# Patient Record
Sex: FEMALE | Race: WHITE | Employment: FULL TIME | URBAN - METROPOLITAN AREA
[De-identification: names, ages, dates, MRNs, and addresses within clinical notes are randomized per-mention and may not be internally consistent; named-entity substitution may affect disease eponyms.]

---

## 2017-08-20 ENCOUNTER — APPOINTMENT (OUTPATIENT)
Dept: CT IMAGING | Age: 52
End: 2017-08-20
Attending: EMERGENCY MEDICINE
Payer: SUBSIDIZED

## 2017-08-20 ENCOUNTER — HOSPITAL ENCOUNTER (EMERGENCY)
Age: 52
Discharge: HOME | End: 2017-08-20
Attending: EMERGENCY MEDICINE
Payer: SUBSIDIZED

## 2017-08-20 VITALS
BODY MASS INDEX: 24.45 KG/M2 | HEART RATE: 72 BPM | OXYGEN SATURATION: 98 % | SYSTOLIC BLOOD PRESSURE: 108 MMHG | RESPIRATION RATE: 18 BRPM | WEIGHT: 138 LBS | TEMPERATURE: 98 F | DIASTOLIC BLOOD PRESSURE: 60 MMHG | HEIGHT: 63 IN

## 2017-08-20 DIAGNOSIS — J18.9 PNEUMONIA OF RIGHT LOWER LOBE DUE TO INFECTIOUS ORGANISM: Primary | ICD-10-CM

## 2017-08-20 DIAGNOSIS — N30.00 ACUTE CYSTITIS WITHOUT HEMATURIA: ICD-10-CM

## 2017-08-20 LAB
ALBUMIN SERPL-MCNC: 3.3 G/DL (ref 3.5–5)
ALBUMIN/GLOB SERPL: 1 {RATIO} (ref 1.2–3.5)
ALP SERPL-CCNC: 122 U/L (ref 50–136)
ALT SERPL-CCNC: 10 U/L (ref 12–65)
ANION GAP SERPL CALC-SCNC: 10 MMOL/L (ref 7–16)
AST SERPL-CCNC: 12 U/L (ref 15–37)
BACTERIA URNS QL MICRO: ABNORMAL /HPF
BASOPHILS # BLD: 0 K/UL (ref 0–0.2)
BASOPHILS NFR BLD: 0 % (ref 0–2)
BILIRUB SERPL-MCNC: 0.2 MG/DL (ref 0.2–1.1)
BUN SERPL-MCNC: 17 MG/DL (ref 6–23)
CALCIUM SERPL-MCNC: 8.2 MG/DL (ref 8.3–10.4)
CASTS URNS QL MICRO: ABNORMAL /LPF
CHLORIDE SERPL-SCNC: 117 MMOL/L (ref 98–107)
CO2 SERPL-SCNC: 17 MMOL/L (ref 21–32)
CREAT SERPL-MCNC: 0.91 MG/DL (ref 0.6–1)
DIFFERENTIAL METHOD BLD: ABNORMAL
EOSINOPHIL # BLD: 0.2 K/UL (ref 0–0.8)
EOSINOPHIL NFR BLD: 2 % (ref 0.5–7.8)
EPI CELLS #/AREA URNS HPF: ABNORMAL /HPF
ERYTHROCYTE [DISTWIDTH] IN BLOOD BY AUTOMATED COUNT: 15.2 % (ref 11.9–14.6)
GLOBULIN SER CALC-MCNC: 3.3 G/DL (ref 2.3–3.5)
GLUCOSE SERPL-MCNC: 89 MG/DL (ref 65–100)
HCT VFR BLD AUTO: 37.5 % (ref 35.8–46.3)
HGB BLD-MCNC: 12 G/DL (ref 11.7–15.4)
IMM GRANULOCYTES # BLD: 0 K/UL (ref 0–0.5)
IMM GRANULOCYTES NFR BLD: 0.1 % (ref 0–5)
LIPASE SERPL-CCNC: 217 U/L (ref 73–393)
LYMPHOCYTES # BLD: 3.3 K/UL (ref 0.5–4.6)
LYMPHOCYTES NFR BLD: 37 % (ref 13–44)
MCH RBC QN AUTO: 32.1 PG (ref 26.1–32.9)
MCHC RBC AUTO-ENTMCNC: 32 G/DL (ref 31.4–35)
MCV RBC AUTO: 100.3 FL (ref 79.6–97.8)
MONOCYTES # BLD: 0.4 K/UL (ref 0.1–1.3)
MONOCYTES NFR BLD: 5 % (ref 4–12)
NEUTS SEG # BLD: 4.9 K/UL (ref 1.7–8.2)
NEUTS SEG NFR BLD: 56 % (ref 43–78)
PLATELET # BLD AUTO: 252 K/UL (ref 150–450)
PMV BLD AUTO: 9.4 FL (ref 10.8–14.1)
POTASSIUM SERPL-SCNC: 3.9 MMOL/L (ref 3.5–5.1)
PROT SERPL-MCNC: 6.6 G/DL (ref 6.3–8.2)
RBC # BLD AUTO: 3.74 M/UL (ref 4.05–5.25)
RBC #/AREA URNS HPF: ABNORMAL /HPF
SODIUM SERPL-SCNC: 144 MMOL/L (ref 136–145)
WBC # BLD AUTO: 8.9 K/UL (ref 4.3–11.1)
WBC URNS QL MICRO: ABNORMAL /HPF

## 2017-08-20 PROCEDURE — 74177 CT ABD & PELVIS W/CONTRAST: CPT

## 2017-08-20 PROCEDURE — 96376 TX/PRO/DX INJ SAME DRUG ADON: CPT | Performed by: EMERGENCY MEDICINE

## 2017-08-20 PROCEDURE — 74011250636 HC RX REV CODE- 250/636: Performed by: EMERGENCY MEDICINE

## 2017-08-20 PROCEDURE — 96375 TX/PRO/DX INJ NEW DRUG ADDON: CPT | Performed by: EMERGENCY MEDICINE

## 2017-08-20 PROCEDURE — 83690 ASSAY OF LIPASE: CPT | Performed by: EMERGENCY MEDICINE

## 2017-08-20 PROCEDURE — 85025 COMPLETE CBC W/AUTO DIFF WBC: CPT | Performed by: EMERGENCY MEDICINE

## 2017-08-20 PROCEDURE — 96374 THER/PROPH/DIAG INJ IV PUSH: CPT | Performed by: EMERGENCY MEDICINE

## 2017-08-20 PROCEDURE — 74011636320 HC RX REV CODE- 636/320: Performed by: EMERGENCY MEDICINE

## 2017-08-20 PROCEDURE — 81015 MICROSCOPIC EXAM OF URINE: CPT | Performed by: EMERGENCY MEDICINE

## 2017-08-20 PROCEDURE — 80053 COMPREHEN METABOLIC PANEL: CPT | Performed by: EMERGENCY MEDICINE

## 2017-08-20 PROCEDURE — 74011000258 HC RX REV CODE- 258: Performed by: EMERGENCY MEDICINE

## 2017-08-20 PROCEDURE — 99284 EMERGENCY DEPT VISIT MOD MDM: CPT | Performed by: EMERGENCY MEDICINE

## 2017-08-20 PROCEDURE — 96361 HYDRATE IV INFUSION ADD-ON: CPT | Performed by: EMERGENCY MEDICINE

## 2017-08-20 PROCEDURE — 96372 THER/PROPH/DIAG INJ SC/IM: CPT | Performed by: EMERGENCY MEDICINE

## 2017-08-20 PROCEDURE — 81003 URINALYSIS AUTO W/O SCOPE: CPT | Performed by: EMERGENCY MEDICINE

## 2017-08-20 RX ORDER — LEVOFLOXACIN 750 MG/1
750 TABLET ORAL DAILY
Qty: 5 TAB | Refills: 0 | Status: SHIPPED | OUTPATIENT
Start: 2017-08-20 | End: 2017-10-17

## 2017-08-20 RX ORDER — MORPHINE SULFATE 2 MG/ML
4 INJECTION, SOLUTION INTRAMUSCULAR; INTRAVENOUS ONCE
Status: COMPLETED | OUTPATIENT
Start: 2017-08-20 | End: 2017-08-20

## 2017-08-20 RX ORDER — SODIUM CHLORIDE 0.9 % (FLUSH) 0.9 %
5-10 SYRINGE (ML) INJECTION AS NEEDED
Status: DISCONTINUED | OUTPATIENT
Start: 2017-08-20 | End: 2017-08-21 | Stop reason: HOSPADM

## 2017-08-20 RX ORDER — PROMETHAZINE HYDROCHLORIDE 25 MG/ML
12.5 INJECTION, SOLUTION INTRAMUSCULAR; INTRAVENOUS
Status: COMPLETED | OUTPATIENT
Start: 2017-08-20 | End: 2017-08-20

## 2017-08-20 RX ORDER — SODIUM CHLORIDE 0.9 % (FLUSH) 0.9 %
5-10 SYRINGE (ML) INJECTION EVERY 8 HOURS
Status: DISCONTINUED | OUTPATIENT
Start: 2017-08-20 | End: 2017-08-21 | Stop reason: HOSPADM

## 2017-08-20 RX ORDER — SODIUM CHLORIDE 0.9 % (FLUSH) 0.9 %
10 SYRINGE (ML) INJECTION
Status: COMPLETED | OUTPATIENT
Start: 2017-08-20 | End: 2017-08-20

## 2017-08-20 RX ORDER — ONDANSETRON 2 MG/ML
4 INJECTION INTRAMUSCULAR; INTRAVENOUS
Status: COMPLETED | OUTPATIENT
Start: 2017-08-20 | End: 2017-08-20

## 2017-08-20 RX ADMIN — ONDANSETRON 4 MG: 2 INJECTION INTRAMUSCULAR; INTRAVENOUS at 19:20

## 2017-08-20 RX ADMIN — IOPAMIDOL 100 ML: 755 INJECTION, SOLUTION INTRAVENOUS at 21:39

## 2017-08-20 RX ADMIN — SODIUM CHLORIDE 500 ML: 900 INJECTION, SOLUTION INTRAVENOUS at 22:20

## 2017-08-20 RX ADMIN — MORPHINE SULFATE 4 MG: 2 INJECTION, SOLUTION INTRAMUSCULAR; INTRAVENOUS at 19:20

## 2017-08-20 RX ADMIN — DIATRIZOATE MEGLUMINE AND DIATRIZOATE SODIUM 15 ML: 600; 100 SOLUTION ORAL; RECTAL at 19:48

## 2017-08-20 RX ADMIN — MORPHINE SULFATE 4 MG: 2 INJECTION, SOLUTION INTRAMUSCULAR; INTRAVENOUS at 22:20

## 2017-08-20 RX ADMIN — Medication 10 ML: at 21:39

## 2017-08-20 RX ADMIN — PROMETHAZINE HYDROCHLORIDE 12.5 MG: 25 INJECTION INTRAMUSCULAR; INTRAVENOUS at 20:20

## 2017-08-20 RX ADMIN — SODIUM CHLORIDE 1000 ML: 900 INJECTION, SOLUTION INTRAVENOUS at 19:20

## 2017-08-20 RX ADMIN — SODIUM CHLORIDE 100 ML: 900 INJECTION, SOLUTION INTRAVENOUS at 21:39

## 2017-08-20 NOTE — ED PROVIDER NOTES
HPI Comments: 46 yo WF w/ PMHx of Kidney stones & GERD presents w/ intermittent, sharp, non-radiating RUQ pain x 3 days. Denies fever, chills, nausea, vomiting, dysuria, hematuria, diarrhea, constipation, SOB, cough, CP, hemoptysis, LE edema/apin. Denies any alleviating or exacerbating factors. Patient is a 47 y.o. female presenting with abdominal pain. The history is provided by the patient. No  was used. Abdominal Pain    This is a new problem. The current episode started more than 2 days ago. The problem occurs constantly. The problem has not changed since onset. The pain is associated with eating. The pain is located in the RUQ. The quality of the pain is sharp. The pain is at a severity of 3/10. Associated symptoms include nausea. Pertinent negatives include no anorexia, no fever, no belching, no diarrhea, no flatus, no hematochezia, no melena, no vomiting, no constipation, no dysuria, no hematuria, no headaches, no trauma, no chest pain and no back pain. The pain is worsened by palpation. The pain is relieved by nothing. Her past medical history is significant for kidney stones. Her past medical history does not include PUD, pancreatitis or diverticulitis. Past Medical History:   Diagnosis Date    Arthritis     Gastrointestinal disorder     GERD, hiatal hernia    GERD (gastroesophageal reflux disease)     Kidney stone     Migraines     Pyelonephritis, acute 6/30/2011    Pyelonephritis, acute        Past Surgical History:   Procedure Laterality Date    HX GYN      hysterectomy    HX OTHER SURGICAL      kidney stone surgery         History reviewed. No pertinent family history. Social History     Social History    Marital status:      Spouse name: N/A    Number of children: N/A    Years of education: N/A     Occupational History    Not on file.      Social History Main Topics    Smoking status: Current Every Day Smoker     Packs/day: 1.00     Years: 30.00  Smokeless tobacco: Never Used    Alcohol use No    Drug use: No    Sexual activity: Not Currently     Other Topics Concern    Not on file     Social History Narrative         ALLERGIES: Darvocet-n 100 [propoxyphene n-acetaminophen] and Toradol [ketorolac tromethamine]    Review of Systems   Constitutional: Negative for chills, fatigue and fever. HENT: Negative for congestion and rhinorrhea. Eyes: Negative for pain and redness. Respiratory: Negative for cough and shortness of breath. Cardiovascular: Negative for chest pain, palpitations and leg swelling. Gastrointestinal: Positive for abdominal pain and nausea. Negative for abdominal distention, anal bleeding, anorexia, blood in stool, constipation, diarrhea, flatus, hematochezia, melena and vomiting. Endocrine: Negative for polydipsia and polyphagia. Genitourinary: Negative for dysuria, flank pain, genital sores, hematuria, pelvic pain, vaginal bleeding and vaginal discharge. Musculoskeletal: Negative for back pain, gait problem, joint swelling, neck pain and neck stiffness. Skin: Negative for pallor and rash. Neurological: Negative for dizziness, seizures, syncope, weakness, numbness and headaches. Psychiatric/Behavioral: Negative for agitation and confusion. Vitals:    08/20/17 2037 08/20/17 2057 08/20/17 2116 08/20/17 2321   BP: 112/53 106/52 106/53 108/60   Pulse:    72   Resp:    18   Temp:    98 °F (36.7 °C)   SpO2: 94% 93% 95% 98%   Weight:       Height:                Physical Exam   Constitutional: She is oriented to person, place, and time. She appears well-developed and well-nourished. No distress. HENT:   Head: Normocephalic and atraumatic. Mouth/Throat: Oropharynx is clear and moist. No oropharyngeal exudate. Eyes: Conjunctivae and EOM are normal. Pupils are equal, round, and reactive to light. Neck: Normal range of motion. No JVD present. No tracheal deviation present.    Cardiovascular: Normal rate, regular rhythm, normal heart sounds and intact distal pulses. Exam reveals no gallop and no friction rub. No murmur heard. Radial pulses 2+ and equal bilaterally   Pulmonary/Chest: Effort normal and breath sounds normal. No respiratory distress. She has no wheezes. She has no rales. She exhibits no tenderness. Abdominal: Soft. Bowel sounds are normal. She exhibits no distension and no mass. There is tenderness. There is no rebound and no guarding.   + RUQ TTP. No CVAT. Negative Lang sign   Musculoskeletal: Normal range of motion. She exhibits no edema, tenderness or deformity. No calf tenderness present   Neurological: She is alert and oriented to person, place, and time. No cranial nerve deficit. Coordination normal.   Skin: Skin is warm and dry. No rash noted. No erythema. No pallor. Psychiatric: She has a normal mood and affect. Her behavior is normal.   Nursing note and vitals reviewed. MDM  Number of Diagnoses or Management Options  Acute cystitis without hematuria: new and requires workup  Pneumonia of right lower lobe due to infectious organism [facility]): new and requires workup  Diagnosis management comments: Patient presents w/ c/o right upper quadrant pain x 3 days. Vital signs stable. Patient is well-appearing and in no acute distress. Labs within normal limits; 1+ bacteria in urine. Evidence of right lower lobe atelectasis/consolidation. Will discharge home with Levaquin to follow with PCP in 1 or 2 days. Patient given return precautions.        Amount and/or Complexity of Data Reviewed  Clinical lab tests: ordered and reviewed  Tests in the radiology section of CPT®: ordered and reviewed  Tests in the medicine section of CPT®: ordered and reviewed  Review and summarize past medical records: yes  Discuss the patient with other providers: yes  Independent visualization of images, tracings, or specimens: yes    Risk of Complications, Morbidity, and/or Mortality  Presenting problems: moderate  Diagnostic procedures: moderate  Management options: moderate    Patient Progress  Patient progress: stable    ED Course   Comment By Time   Patient with right flank pain. CT pending. Frank Garcia MD 08/20 0746   Patient with mild evidence of dehydration. Patient is administered IV fluid boluses. CT negative for any acute intra-abdominal findings. UA with 1+ bacteria. Findings of right lower lobe pneumonia present. Will discharge patient home with Levaquin and have follow with PCP in 1 to 2 days.  Penelope Hernandez MD 08/20 4043       Procedures

## 2017-08-21 NOTE — DISCHARGE INSTRUCTIONS
Urinary Tract Infection in Women: Care Instructions  Your Care Instructions    A urinary tract infection, or UTI, is a general term for an infection anywhere between the kidneys and the urethra (where urine comes out). Most UTIs are bladder infections. They often cause pain or burning when you urinate. UTIs are caused by bacteria and can be cured with antibiotics. Be sure to complete your treatment so that the infection goes away. Follow-up care is a key part of your treatment and safety. Be sure to make and go to all appointments, and call your doctor if you are having problems. It's also a good idea to know your test results and keep a list of the medicines you take. How can you care for yourself at home? · Take your antibiotics as directed. Do not stop taking them just because you feel better. You need to take the full course of antibiotics. · Drink extra water and other fluids for the next day or two. This may help wash out the bacteria that are causing the infection. (If you have kidney, heart, or liver disease and have to limit fluids, talk with your doctor before you increase your fluid intake.)  · Avoid drinks that are carbonated or have caffeine. They can irritate the bladder. · Urinate often. Try to empty your bladder each time. · To relieve pain, take a hot bath or lay a heating pad set on low over your lower belly or genital area. Never go to sleep with a heating pad in place. To prevent UTIs  · Drink plenty of water each day. This helps you urinate often, which clears bacteria from your system. (If you have kidney, heart, or liver disease and have to limit fluids, talk with your doctor before you increase your fluid intake.)  · Urinate when you need to. · Urinate right after you have sex. · Change sanitary pads often. · Avoid douches, bubble baths, feminine hygiene sprays, and other feminine hygiene products that have deodorants.   · After going to the bathroom, wipe from front to back.  When should you call for help? Call your doctor now or seek immediate medical care if:  · Symptoms such as fever, chills, nausea, or vomiting get worse or appear for the first time. · You have new pain in your back just below your rib cage. This is called flank pain. · There is new blood or pus in your urine. · You have any problems with your antibiotic medicine. Watch closely for changes in your health, and be sure to contact your doctor if:  · You are not getting better after taking an antibiotic for 2 days. · Your symptoms go away but then come back. Where can you learn more? Go to http://osito-ashley.info/. Enter X680 in the search box to learn more about \"Urinary Tract Infection in Women: Care Instructions. \"  Current as of: November 28, 2016  Content Version: 11.3  © 6932-7132 [employer]. Care instructions adapted under license by [employer] (which disclaims liability or warranty for this information). If you have questions about a medical condition or this instruction, always ask your healthcare professional. [place] [address] any warranty or liability for your use of this information. Pneumonia: Care Instructions  Your Care Instructions    Pneumonia is an infection of the lungs. Most cases are caused by infections from bacteria or viruses. Pneumonia may be mild or very severe. If it is caused by bacteria, you will be treated with antibiotics. It may take a few weeks to a few months to recover fully from pneumonia, depending on how sick you were and whether your overall health is good. Follow-up care is a key part of your treatment and safety. Be sure to make and go to all appointments, and call your doctor if you are having problems. Its also a good idea to know your test results and keep a list of the medicines you take. How can you care for yourself at home? · Take your antibiotics exactly as directed.  Do not stop taking the medicine just because you are feeling better. You need to take the full course of antibiotics. · Take your medicines exactly as prescribed. Call your doctor if you think you are having a problem with your medicine. · Get plenty of rest and sleep. You may feel weak and tired for a while, but your energy level will improve with time. · To prevent dehydration, drink plenty of fluids, enough so that your urine is light yellow or clear like water. Choose water and other caffeine-free clear liquids until you feel better. If you have kidney, heart, or liver disease and have to limit fluids, talk with your doctor before you increase the amount of fluids you drink. · Take care of your cough so you can rest. A cough that brings up mucus from your lungs is common with pneumonia. It is one way your body gets rid of the infection. But if coughing keeps you from resting or causes severe fatigue and chest-wall pain, talk to your doctor. He or she may suggest that you take a medicine to reduce the cough. · Use a vaporizer or humidifier to add moisture to your bedroom. Follow the directions for cleaning the machine. · Do not smoke or allow others to smoke around you. Smoke will make your cough last longer. If you need help quitting, talk to your doctor about stop-smoking programs and medicines. These can increase your chances of quitting for good. · Take an over-the-counter pain medicine, such as acetaminophen (Tylenol), ibuprofen (Advil, Motrin), or naproxen (Aleve). Read and follow all instructions on the label. · Do not take two or more pain medicines at the same time unless the doctor told you to. Many pain medicines have acetaminophen, which is Tylenol. Too much acetaminophen (Tylenol) can be harmful. · If you were given a spirometer to measure how well your lungs are working, use it as instructed. This can help your doctor tell how your recovery is going.   · To prevent pneumonia in the future, talk to your doctor about getting a flu vaccine (once a year) and a pneumococcal vaccine (one time only for most people). When should you call for help? Call 911 anytime you think you may need emergency care. For example, call if:  · You have severe trouble breathing. Call your doctor now or seek immediate medical care if:  · You cough up dark brown or bloody mucus (sputum). · You have new or worse trouble breathing. · You are dizzy or lightheaded, or you feel like you may faint. Watch closely for changes in your health, and be sure to contact your doctor if:  · You have a new or higher fever. · You are coughing more deeply or more often. · You are not getting better after 2 days (48 hours). · You do not get better as expected. Where can you learn more? Go to http://osito-ashley.info/. Enter [id number] in the search box to learn more about \"Pneumonia: Care Instructions. \"  Current as of: March 25, 2017  Content Version: 11.3  © 9292-7746 [employer]. Care instructions adapted under license by [employer] (which disclaims liability or warranty for this information). If you have questions about a medical condition or this instruction, always ask your healthcare professional. [address] any warranty or liability for your use of this information. Abdominal Pain: Care Instructions  Your Care Instructions    Abdominal pain has many possible causes. Some aren't serious and get better on their own in a few days. Others need more testing and treatment. If your pain continues or gets worse, you need to be rechecked and may need more tests to find out what is wrong. You may need surgery to correct the problem. Don't ignore new symptoms, such as fever, nausea and vomiting, urination problems, pain that gets worse, and dizziness. These may be signs of a more serious problem. Your doctor may have recommended a follow-up visit in the next 8 to 12 hours.  If you are not getting better, you may need more tests or treatment. The doctor has checked you carefully, but problems can develop later. If you notice any problems or new symptoms, get medical treatment right away. Follow-up care is a key part of your treatment and safety. Be sure to make and go to all appointments, and call your doctor if you are having problems. It's also a good idea to know your test results and keep a list of the medicines you take. How can you care for yourself at home? · Rest until you feel better. · To prevent dehydration, drink plenty of fluids, enough so that your urine is light yellow or clear like water. Choose water and other caffeine-free clear liquids until you feel better. If you have kidney, heart, or liver disease and have to limit fluids, talk with your doctor before you increase the amount of fluids you drink. · If your stomach is upset, eat mild foods, such as rice, dry toast or crackers, bananas, and applesauce. Try eating several small meals instead of two or three large ones. · Wait until 48 hours after all symptoms have gone away before you have spicy foods, alcohol, and drinks that contain caffeine. · Do not eat foods that are high in fat. · Avoid anti-inflammatory medicines such as aspirin, ibuprofen (Advil, Motrin), and naproxen (Aleve). These can cause stomach upset. Talk to your doctor if you take daily aspirin for another health problem. When should you call for help? Call 911 anytime you think you may need emergency care. For example, call if:  · You passed out (lost consciousness). · You pass maroon or very bloody stools. · You vomit blood or what looks like coffee grounds. · You have new, severe belly pain. Call your doctor now or seek immediate medical care if:  · Your pain gets worse, especially if it becomes focused in one area of your belly. · You have a new or higher fever. · Your stools are black and look like tar, or they have streaks of blood.   · You have unexpected vaginal bleeding. · You have symptoms of a urinary tract infection. These may include:  ¨ Pain when you urinate. ¨ Urinating more often than usual.  ¨ Blood in your urine. · You are dizzy or lightheaded, or you feel like you may faint. Watch closely for changes in your health, and be sure to contact your doctor if:  · You are not getting better after 1 day (24 hours). Where can you learn more? Go to http://osito-ashley.info/. Enter G383 in the search box to learn more about \"Abdominal Pain: Care Instructions. \"  Current as of: March 20, 2017  Content Version: 11.3  © 6552-3439 [employer]. Care instructions adapted under license by [employer] (which disclaims liability or warranty for this information).  If you have questions about a medical condition or this instruction, always ask your healthcare professional. [place] [address] any warranty or liability for your use of this information.

## 2017-10-08 ENCOUNTER — HOSPITAL ENCOUNTER (EMERGENCY)
Age: 52
Discharge: HOME | End: 2017-10-08
Attending: EMERGENCY MEDICINE
Payer: SUBSIDIZED

## 2017-10-08 ENCOUNTER — APPOINTMENT (OUTPATIENT)
Dept: GENERAL RADIOLOGY | Age: 52
End: 2017-10-08
Attending: NURSE PRACTITIONER
Payer: SUBSIDIZED

## 2017-10-08 ENCOUNTER — APPOINTMENT (OUTPATIENT)
Dept: GENERAL RADIOLOGY | Age: 52
End: 2017-10-08
Attending: EMERGENCY MEDICINE
Payer: SUBSIDIZED

## 2017-10-08 ENCOUNTER — APPOINTMENT (OUTPATIENT)
Dept: ULTRASOUND IMAGING | Age: 52
End: 2017-10-08
Attending: EMERGENCY MEDICINE
Payer: SUBSIDIZED

## 2017-10-08 VITALS
BODY MASS INDEX: 23.39 KG/M2 | WEIGHT: 132 LBS | HEIGHT: 63 IN | OXYGEN SATURATION: 92 % | RESPIRATION RATE: 26 BRPM | SYSTOLIC BLOOD PRESSURE: 123 MMHG | DIASTOLIC BLOOD PRESSURE: 58 MMHG | HEART RATE: 73 BPM | TEMPERATURE: 97.9 F

## 2017-10-08 DIAGNOSIS — K80.50 RECURRENT BILIARY COLIC: Primary | ICD-10-CM

## 2017-10-08 LAB
ALBUMIN SERPL-MCNC: 3.4 G/DL (ref 3.5–5)
ALBUMIN/GLOB SERPL: 0.9 {RATIO} (ref 1.2–3.5)
ALP SERPL-CCNC: 130 U/L (ref 50–136)
ALT SERPL-CCNC: 9 U/L (ref 12–65)
ANION GAP SERPL CALC-SCNC: 9 MMOL/L (ref 7–16)
AST SERPL-CCNC: 16 U/L (ref 15–37)
BASOPHILS # BLD: 0 K/UL (ref 0–0.2)
BASOPHILS NFR BLD: 0 % (ref 0–2)
BILIRUB SERPL-MCNC: 0.3 MG/DL (ref 0.2–1.1)
BUN SERPL-MCNC: 14 MG/DL (ref 6–23)
CALCIUM SERPL-MCNC: 9.2 MG/DL (ref 8.3–10.4)
CHLORIDE SERPL-SCNC: 109 MMOL/L (ref 98–107)
CO2 SERPL-SCNC: 23 MMOL/L (ref 21–32)
CREAT SERPL-MCNC: 0.52 MG/DL (ref 0.6–1)
CRP SERPL-MCNC: 1.3 MG/DL (ref 0–0.9)
DIFFERENTIAL METHOD BLD: ABNORMAL
EOSINOPHIL # BLD: 0.1 K/UL (ref 0–0.8)
EOSINOPHIL NFR BLD: 1 % (ref 0.5–7.8)
ERYTHROCYTE [DISTWIDTH] IN BLOOD BY AUTOMATED COUNT: 14.8 % (ref 11.9–14.6)
GLOBULIN SER CALC-MCNC: 3.8 G/DL (ref 2.3–3.5)
GLUCOSE SERPL-MCNC: 111 MG/DL (ref 65–100)
HCT VFR BLD AUTO: 40.3 % (ref 35.8–46.3)
HGB BLD-MCNC: 13.4 G/DL (ref 11.7–15.4)
IMM GRANULOCYTES # BLD: 0 K/UL (ref 0–0.5)
IMM GRANULOCYTES NFR BLD: 0.2 % (ref 0–5)
LIPASE SERPL-CCNC: 229 U/L (ref 73–393)
LYMPHOCYTES # BLD: 3.2 K/UL (ref 0.5–4.6)
LYMPHOCYTES NFR BLD: 35 % (ref 13–44)
MCH RBC QN AUTO: 33.4 PG (ref 26.1–32.9)
MCHC RBC AUTO-ENTMCNC: 33.3 G/DL (ref 31.4–35)
MCV RBC AUTO: 100.5 FL (ref 79.6–97.8)
MONOCYTES # BLD: 0.4 K/UL (ref 0.1–1.3)
MONOCYTES NFR BLD: 4 % (ref 4–12)
NEUTS SEG # BLD: 5.5 K/UL (ref 1.7–8.2)
NEUTS SEG NFR BLD: 60 % (ref 43–78)
PLATELET # BLD AUTO: 272 K/UL (ref 150–450)
PMV BLD AUTO: 9.4 FL (ref 10.8–14.1)
POTASSIUM SERPL-SCNC: 4.2 MMOL/L (ref 3.5–5.1)
PROT SERPL-MCNC: 7.2 G/DL (ref 6.3–8.2)
RBC # BLD AUTO: 4.01 M/UL (ref 4.05–5.25)
SODIUM SERPL-SCNC: 141 MMOL/L (ref 136–145)
WBC # BLD AUTO: 9.3 K/UL (ref 4.3–11.1)

## 2017-10-08 PROCEDURE — 74022 RADEX COMPL AQT ABD SERIES: CPT

## 2017-10-08 PROCEDURE — 96361 HYDRATE IV INFUSION ADD-ON: CPT | Performed by: EMERGENCY MEDICINE

## 2017-10-08 PROCEDURE — 96375 TX/PRO/DX INJ NEW DRUG ADDON: CPT | Performed by: EMERGENCY MEDICINE

## 2017-10-08 PROCEDURE — 80053 COMPREHEN METABOLIC PANEL: CPT | Performed by: NURSE PRACTITIONER

## 2017-10-08 PROCEDURE — 96376 TX/PRO/DX INJ SAME DRUG ADON: CPT | Performed by: EMERGENCY MEDICINE

## 2017-10-08 PROCEDURE — 74011000250 HC RX REV CODE- 250: Performed by: EMERGENCY MEDICINE

## 2017-10-08 PROCEDURE — 74011250637 HC RX REV CODE- 250/637: Performed by: EMERGENCY MEDICINE

## 2017-10-08 PROCEDURE — 74011250636 HC RX REV CODE- 250/636: Performed by: EMERGENCY MEDICINE

## 2017-10-08 PROCEDURE — 96374 THER/PROPH/DIAG INJ IV PUSH: CPT | Performed by: EMERGENCY MEDICINE

## 2017-10-08 PROCEDURE — 81003 URINALYSIS AUTO W/O SCOPE: CPT | Performed by: EMERGENCY MEDICINE

## 2017-10-08 PROCEDURE — 83690 ASSAY OF LIPASE: CPT | Performed by: NURSE PRACTITIONER

## 2017-10-08 PROCEDURE — 76705 ECHO EXAM OF ABDOMEN: CPT

## 2017-10-08 PROCEDURE — 86140 C-REACTIVE PROTEIN: CPT | Performed by: EMERGENCY MEDICINE

## 2017-10-08 PROCEDURE — 85025 COMPLETE CBC W/AUTO DIFF WBC: CPT | Performed by: NURSE PRACTITIONER

## 2017-10-08 PROCEDURE — 99285 EMERGENCY DEPT VISIT HI MDM: CPT | Performed by: EMERGENCY MEDICINE

## 2017-10-08 RX ORDER — HYOSCYAMINE SULFATE 0.125 MG
125 TABLET ORAL
Qty: 12 TAB | Refills: 0 | Status: SHIPPED | OUTPATIENT
Start: 2017-10-08 | End: 2017-11-07

## 2017-10-08 RX ORDER — HYDROMORPHONE HYDROCHLORIDE 1 MG/ML
1 INJECTION, SOLUTION INTRAMUSCULAR; INTRAVENOUS; SUBCUTANEOUS
Status: COMPLETED | OUTPATIENT
Start: 2017-10-08 | End: 2017-10-08

## 2017-10-08 RX ORDER — PROMETHAZINE HYDROCHLORIDE 25 MG/1
25 TABLET ORAL
Qty: 12 TAB | Refills: 0 | Status: SHIPPED | OUTPATIENT
Start: 2017-10-08 | End: 2018-07-31

## 2017-10-08 RX ORDER — HYDROCODONE BITARTRATE AND ACETAMINOPHEN 5; 325 MG/1; MG/1
1 TABLET ORAL
Qty: 12 TAB | Refills: 0 | Status: SHIPPED | OUTPATIENT
Start: 2017-10-08 | End: 2017-11-01

## 2017-10-08 RX ORDER — HYOSCYAMINE SULFATE 0.12 MG/1
0.12 TABLET SUBLINGUAL
Status: COMPLETED | OUTPATIENT
Start: 2017-10-08 | End: 2017-10-08

## 2017-10-08 RX ADMIN — HYDROMORPHONE HYDROCHLORIDE 1 MG: 1 INJECTION, SOLUTION INTRAMUSCULAR; INTRAVENOUS; SUBCUTANEOUS at 15:47

## 2017-10-08 RX ADMIN — HYOSCYAMINE SULFATE 0.12 MG: 0.12 TABLET SUBLINGUAL at 13:33

## 2017-10-08 RX ADMIN — SODIUM CHLORIDE 1000 ML: 900 INJECTION, SOLUTION INTRAVENOUS at 13:30

## 2017-10-08 RX ADMIN — HYDROMORPHONE HYDROCHLORIDE 1 MG: 1 INJECTION, SOLUTION INTRAMUSCULAR; INTRAVENOUS; SUBCUTANEOUS at 13:30

## 2017-10-08 RX ADMIN — PROMETHAZINE HYDROCHLORIDE 12.5 MG: 25 INJECTION INTRAMUSCULAR; INTRAVENOUS at 13:30

## 2017-10-08 NOTE — ED NOTES
I have reviewed discharge instructions with the patient. The patient verbalized understanding. Patient left ED via Discharge Method: ambulatory to Home with family. Opportunity for questions and clarification provided. Patient given 3 scripts.

## 2017-10-08 NOTE — DISCHARGE INSTRUCTIONS
Gallbladder and Liver: Anatomy Sketch    Current as of: January 5, 2017  Content Version: 11.3  © 6473-7410 [employer]. Care instructions adapted under license by [employer] (which disclaims liability or warranty for this information). If you have questions about a medical condition or this instruction, always ask your healthcare professional. [address] any warranty or liability for your use of this information. Low-Fat Diet for Gallbladder Disease: Care Instructions  Your Care Instructions  When you eat, the gallbladder releases bile, which helps you digest the fat in food. If you have an inflamed gallbladder, this may cause pain. A low-fat diet may give your gallbladder a rest so you can start to heal. Your doctor and dietitian can help you make an eating plan that does not irritate your digestive system. Always talk with your doctor or dietitian before you make changes in your diet. Follow-up care is a key part of your treatment and safety. Be sure to make and go to all appointments, and call your doctor if you are having problems. It's also a good idea to know your test results and keep a list of the medicines you take. How can you care for yourself at home? · Eat many small meals and snacks each day instead of three large meals. · Choose lean meats. ¨ Eat no more than 5 to 6½ ounces of meat a day. ¨ Cut off all fat you can see. ¨ Eat chicken and turkey without the skin. ¨ Many types of fish, such as salmon, lake trout, tuna, and herring, provide healthy omega-3 fat. But, avoid fish canned in oil, such as sardines in olive oil. ¨ Bake, broil, or grill meats, poultry, or fish instead of frying them in butter or fat. · Drink or eat nonfat or low-fat milk, yogurt, cheese, or other milk products each day. ¨ Read the labels on cheeses, and choose those with less than 5 grams of fat an ounce.   ¨ Try fat-free sour cream, cream cheese, or yogurt. ¨ Avoid cream soups and cream sauces on pasta. ¨ Eat low-fat ice cream, frozen yogurt, or sorbet. Avoid regular ice cream.  · Eat whole-grain cereals, breads, crackers, rice, or pasta. Avoid high-fat foods such as croissants, scones, biscuits, waffles, doughnuts, muffins, granola, and high-fat breads. · Flavor your foods with herbs and spices (such as basil, tarragon, or mint), fat-free sauces, or lemon juice instead of butter. You can also use butter substitutes, fat-free mayonnaise, or fat-free dressing. · Try applesauce, prune puree, or mashed bananas to replace some or all of the fat when you bake. · Limit fats and oils, such as butter, margarine, mayonnaise, and salad dressing, to no more than 1 tablespoon a meal.  · Avoid high-fat foods, such as:  ¨ Chocolate, whole milk, ice cream, and processed cheese. ¨ Fried or buttered foods. ¨ Sausage, salami, and herrera. ¨ Cinnamon rolls, cakes, pies, cookies, and other pastries. ¨ Prepared snack foods, such as potato chips, nut and granola bars, and mixed nuts. ¨ Coconut and avocado. · Learn how to read food labels for serving sizes and ingredients. Fast-food and convenience-food meals often have lots of fat. Where can you learn more? Go to http://oisto-ashley.info/. Enter Z780 in the search box to learn more about \"Low-Fat Diet for Gallbladder Disease: Care Instructions. \"  Current as of: July 26, 2016  Content Version: 11.3  © 5369-2777 [employer]. Care instructions adapted under license by [employer] (which disclaims liability or warranty for this information). If you have questions about a medical condition or this instruction, always ask your healthcare professional. [address] any warranty or liability for your use of this information. Cholecystectomy: Before Your Surgery  What is cholecystectomy? Cholecystectomy (rm-lry-ujy-HARSHAL-tuh-jane) is a type of surgery.  It removes a diseased gallbladder. This surgery is usually done as a laparoscopic surgery. The doctor puts a lighted tube and other surgical tools through small cuts (incisions) in your belly. The tube is called a scope. It lets your doctor see your organs so he or she can do the surgery. The incisions leave scars that fade with time. Most people go home the same day. You probably will feel better each day. Most people have only a small amount of pain after 1 week. If you have a desk job, you can probably go back to work in 1 to 2 weeks. If you lift heavy objects or have a very active job, it may take up to 4 weeks. In some cases, open surgery is the best choice. Your doctor may choose open surgery in advance. Or he or she may choose it in the middle of laparoscopic surgery. In open surgery, the doctor makes a larger incision in your upper belly. If you have open surgery, you will probably stay in the hospital for 2 to 4 days. And it may take 4 to 6 weeks to get back to your normal routine. Follow-up care is a key part of your treatment and safety. Be sure to make and go to all appointments, and call your doctor if you are having problems. It's also a good idea to know your test results and keep a list of the medicines you take. What happens before surgery? Surgery can be stressful. This information will help you understand what you can expect. And it will help you safely prepare for surgery. Preparing for surgery  · Understand exactly what surgery is planned, along with the risks, benefits, and other options. · Tell your doctors ALL the medicines, vitamins, supplements, and herbal remedies you take. Some of these can increase the risk of bleeding or interact with anesthesia. · If you take blood thinners, such as warfarin (Coumadin), clopidogrel (Plavix), or aspirin, be sure to talk to your doctor. He or she will tell you if you should stop taking these medicines before your surgery.  Make sure that you understand exactly what your doctor wants you to do. · Your doctor will tell you which medicines to take or stop before your surgery. You may need to stop taking certain medicines a week or more before surgery. So talk to your doctor as soon as you can. · If you have an advance directive, let your doctor know. It may include a living will and a durable power of  for health care. Bring a copy to the hospital. If you don't have one, you may want to prepare one. It lets your doctor and loved ones know your health care wishes. Doctors advise that everyone prepare these papers before any type of surgery or procedure. · You may need to empty your colon with an enema or laxative. Your doctor will tell you how to do this. What happens on the day of surgery? · Follow the instructions exactly about when to stop eating and drinking. If you don't, your surgery may be canceled. If your doctor told you to take your medicines on the day of surgery, take them with only a sip of water. · Take a bath or shower before you come in for your surgery. Do not apply lotions, perfumes, deodorants, or nail polish. · Do not shave the surgical site yourself. · Take off all jewelry and piercings. And take out contact lenses, if you wear them. At the hospital or surgery center  · Bring a picture ID. · The area for surgery is often marked to make sure there are no errors. · You will be kept comfortable and safe by your anesthesia provider. You will be asleep during the surgery. · The surgery usually takes 1 to 2 hours. Going home  · Be sure you have someone to drive you home. Anesthesia and pain medicine make it unsafe for you to drive. · You will be given more specific instructions about recovering from your surgery. They will cover things like diet, wound care, follow-up care, driving, and getting back to your normal routine. When should you call your doctor? · You have questions or concerns.   · You don't understand how to prepare for your surgery. · You become ill before the surgery (such as fever, flu, or a cold). · You need to reschedule or have changed your mind about having the surgery. Where can you learn more? Go to http://osito-ashley.info/. Enter T811 in the search box to learn more about \"Cholecystectomy: Before Your Surgery. \"  Current as of: August 9, 2016  Content Version: 11.3  © 7798-9820 [employer]. Care instructions adapted under license by [employer] (which disclaims liability or warranty for this information). If you have questions about a medical condition or this instruction, always ask your healthcare professional. [address] any warranty or liability for your use of this information.

## 2017-10-08 NOTE — ED PROVIDER NOTES
HPI Comments: Patient returns to return with complaints of persistent right upper quadrant abdominal pain that has been present since at least August 20 when she was originally seen in the emergency Department for the same. CT at that time demonstrated left lower lobe infiltrate possibly and the patient was treated as pneumonia although she was complaining of right-sided abdominal pain at that time as well. She denies any fever but reports the pain has been constant since previously being seen and that she's been having persistent and uncontrollable nausea and vomiting for the past week she reports subjective fever and chills  No diarrhea and rates the pain as 10 over 10 in intensity stabbing in nature in the right upper quadrant radiating to the right side of the back    Patient is a 47 y.o. female presenting with abdominal pain. The history is provided by the patient. Abdominal Pain    This is a chronic problem. Episode onset: about 5 weeks ago. The problem occurs constantly. The problem has been gradually worsening. The pain is associated with vomiting. The pain is located in the RUQ. The quality of the pain is sharp. The pain is at a severity of 10/10. The pain is severe. Associated symptoms include nausea and vomiting. Pertinent negatives include no anorexia, no fever, no belching, no diarrhea, no flatus, no hematochezia, no melena, no constipation, no dysuria, no frequency, no hematuria, no headaches, no arthralgias, no myalgias, no chest pain and no back pain. Nothing worsens the pain. The pain is relieved by nothing. Past workup includes CT scan. The patient's surgical history includes hysterectomy.        Past Medical History:   Diagnosis Date    Arthritis     Gastrointestinal disorder     GERD, hiatal hernia    GERD (gastroesophageal reflux disease)     Kidney stone     Migraines     Pyelonephritis, acute 6/30/2011    Pyelonephritis, acute        Past Surgical History:   Procedure Laterality Date  HX GYN      hysterectomy    HX OTHER SURGICAL      kidney stone surgery         No family history on file. Social History     Social History    Marital status:      Spouse name: N/A    Number of children: N/A    Years of education: N/A     Occupational History    Not on file. Social History Main Topics    Smoking status: Current Every Day Smoker     Packs/day: 1.00     Years: 30.00    Smokeless tobacco: Never Used    Alcohol use No    Drug use: No    Sexual activity: Not Currently     Other Topics Concern    Not on file     Social History Narrative         ALLERGIES: Darvocet-n 100 [propoxyphene n-acetaminophen] and Toradol [ketorolac tromethamine]    Review of Systems   Constitutional: Negative for fever. Cardiovascular: Negative for chest pain. Gastrointestinal: Positive for abdominal pain, nausea and vomiting. Negative for anorexia, constipation, diarrhea, flatus, hematochezia and melena. Genitourinary: Negative for dysuria, frequency and hematuria. Musculoskeletal: Negative for arthralgias, back pain and myalgias. Neurological: Negative for headaches. All other systems reviewed and are negative. Vitals:    10/08/17 1247   BP: 119/82   Pulse: 80   Resp: 26   Temp: 97.9 °F (36.6 °C)   SpO2: 97%   Weight: 59.9 kg (132 lb)   Height: 5' 3\" (1.6 m)            Physical Exam   Constitutional: She is oriented to person, place, and time. She appears well-developed and well-nourished. No distress. HENT:   Head: Normocephalic and atraumatic. Eyes: Conjunctivae and EOM are normal. Pupils are equal, round, and reactive to light. Neck: Normal range of motion. Neck supple. Cardiovascular: Normal rate, regular rhythm and normal heart sounds. Pulmonary/Chest: Effort normal and breath sounds normal.   Abdominal: Soft. She exhibits no distension and no mass. There is tenderness. There is no rebound and no guarding.    Positive right flank tenderness, bowel sounds diminished to absent   Musculoskeletal: Normal range of motion. She exhibits no edema or tenderness. Neurological: She is alert and oriented to person, place, and time. Skin: Skin is warm and dry. Psychiatric: She has a normal mood and affect. Her behavior is normal.   Nursing note and vitals reviewed.        MDM  Number of Diagnoses or Management Options     Amount and/or Complexity of Data Reviewed  Clinical lab tests: ordered and reviewed  Tests in the radiology section of CPT®: ordered and reviewed  Independent visualization of images, tracings, or specimens: yes    Risk of Complications, Morbidity, and/or Mortality  Presenting problems: high  Diagnostic procedures: moderate  Management options: moderate    Patient Progress  Patient progress: stable    ED Course       Procedures

## 2017-10-08 NOTE — ED NOTES
Assumed care of patient at shift change. Patient history, physical, and results reviewed by myself. Independent exam performed. Ultrasound shows distended gallbladder, otherwise unremarkable. Given surgery follow-up for HIDA scan. I discussed the results of all labs, procedures, radiographs, and treatments with the patient and available family. Treatment plan is agreed upon and the patient is ready for discharge. Questions about treatment in the ED and differential diagnosis of presenting condition were answered. Patient was given verbal discharge instructions including, but not limited to, importance of returning to the emergency department for any concern of worsening or continued symptoms. Instructions were given to follow up with a primary care provider or specialist within 1-2 days. Adverse effects of medications, if prescribed, were discussed and patient was advised to refrain from significant physical activity until followed up by primary care physician and to not drive or operate heavy machinery after taking any sedating substances.

## 2017-10-17 PROBLEM — R10.11 RIGHT UPPER QUADRANT ABDOMINAL PAIN: Status: ACTIVE | Noted: 2017-10-17

## 2017-10-18 ENCOUNTER — HOSPITAL ENCOUNTER (OUTPATIENT)
Dept: NUCLEAR MEDICINE | Age: 52
Discharge: HOME | End: 2017-10-18
Attending: SURGERY
Payer: SUBSIDIZED

## 2017-10-18 DIAGNOSIS — R10.11 RIGHT UPPER QUADRANT ABDOMINAL PAIN: ICD-10-CM

## 2017-10-18 PROCEDURE — 78227 HEPATOBIL SYST IMAGE W/DRUG: CPT

## 2017-10-18 PROCEDURE — 74011250636 HC RX REV CODE- 250/636: Performed by: SURGERY

## 2017-10-18 RX ORDER — SODIUM CHLORIDE 0.9 % (FLUSH) 0.9 %
10 SYRINGE (ML) INJECTION
Status: COMPLETED | OUTPATIENT
Start: 2017-10-18 | End: 2017-10-18

## 2017-10-18 RX ADMIN — Medication 10 ML: at 13:08

## 2017-10-18 RX ADMIN — SINCALIDE 1.19 MCG: 5 INJECTION, POWDER, LYOPHILIZED, FOR SOLUTION INTRAVENOUS at 14:11

## 2017-11-01 ENCOUNTER — HOSPITAL ENCOUNTER (EMERGENCY)
Age: 52
Discharge: HOME | End: 2017-11-01
Attending: EMERGENCY MEDICINE
Payer: SUBSIDIZED

## 2017-11-01 ENCOUNTER — APPOINTMENT (OUTPATIENT)
Dept: ULTRASOUND IMAGING | Age: 52
End: 2017-11-01
Attending: EMERGENCY MEDICINE
Payer: SUBSIDIZED

## 2017-11-01 VITALS
HEIGHT: 63 IN | SYSTOLIC BLOOD PRESSURE: 140 MMHG | BODY MASS INDEX: 22.5 KG/M2 | WEIGHT: 127 LBS | RESPIRATION RATE: 18 BRPM | DIASTOLIC BLOOD PRESSURE: 74 MMHG | TEMPERATURE: 98 F | OXYGEN SATURATION: 99 % | HEART RATE: 89 BPM

## 2017-11-01 DIAGNOSIS — R10.11 RUQ PAIN: ICD-10-CM

## 2017-11-01 DIAGNOSIS — K80.20 CALCULUS OF GALLBLADDER WITHOUT CHOLECYSTITIS WITHOUT OBSTRUCTION: Primary | ICD-10-CM

## 2017-11-01 DIAGNOSIS — K80.50 RECURRENT BILIARY COLIC: ICD-10-CM

## 2017-11-01 LAB
ALBUMIN SERPL-MCNC: 3.8 G/DL (ref 3.5–5)
ALBUMIN/GLOB SERPL: 1 {RATIO} (ref 1.2–3.5)
ALP SERPL-CCNC: 126 U/L (ref 50–136)
ALT SERPL-CCNC: 14 U/L (ref 12–65)
ANION GAP SERPL CALC-SCNC: 11 MMOL/L (ref 7–16)
AST SERPL-CCNC: 15 U/L (ref 15–37)
BASOPHILS # BLD: 0 K/UL (ref 0–0.2)
BASOPHILS NFR BLD: 0 % (ref 0–2)
BILIRUB SERPL-MCNC: 0.1 MG/DL (ref 0.2–1.1)
BUN SERPL-MCNC: 17 MG/DL (ref 6–23)
CALCIUM SERPL-MCNC: 8.5 MG/DL (ref 8.3–10.4)
CHLORIDE SERPL-SCNC: 111 MMOL/L (ref 98–107)
CO2 SERPL-SCNC: 18 MMOL/L (ref 21–32)
CREAT SERPL-MCNC: 0.66 MG/DL (ref 0.6–1)
DIFFERENTIAL METHOD BLD: ABNORMAL
EOSINOPHIL # BLD: 0.2 K/UL (ref 0–0.8)
EOSINOPHIL NFR BLD: 2 % (ref 0.5–7.8)
ERYTHROCYTE [DISTWIDTH] IN BLOOD BY AUTOMATED COUNT: 14.6 % (ref 11.9–14.6)
GLOBULIN SER CALC-MCNC: 3.8 G/DL (ref 2.3–3.5)
GLUCOSE SERPL-MCNC: 133 MG/DL (ref 65–100)
HCT VFR BLD AUTO: 39.8 % (ref 35.8–46.3)
HGB BLD-MCNC: 13 G/DL (ref 11.7–15.4)
IMM GRANULOCYTES # BLD: 0 K/UL (ref 0–0.5)
IMM GRANULOCYTES NFR BLD: 0 % (ref 0–5)
LIPASE SERPL-CCNC: 254 U/L (ref 73–393)
LYMPHOCYTES # BLD: 3.9 K/UL (ref 0.5–4.6)
LYMPHOCYTES NFR BLD: 44 % (ref 13–44)
MCH RBC QN AUTO: 32.6 PG (ref 26.1–32.9)
MCHC RBC AUTO-ENTMCNC: 32.7 G/DL (ref 31.4–35)
MCV RBC AUTO: 99.7 FL (ref 79.6–97.8)
MONOCYTES # BLD: 0.4 K/UL (ref 0.1–1.3)
MONOCYTES NFR BLD: 4 % (ref 4–12)
NEUTS SEG # BLD: 4.4 K/UL (ref 1.7–8.2)
NEUTS SEG NFR BLD: 50 % (ref 43–78)
PLATELET # BLD AUTO: 326 K/UL (ref 150–450)
PMV BLD AUTO: 9.1 FL (ref 10.8–14.1)
POTASSIUM SERPL-SCNC: 3.6 MMOL/L (ref 3.5–5.1)
PROT SERPL-MCNC: 7.6 G/DL (ref 6.3–8.2)
RBC # BLD AUTO: 3.99 M/UL (ref 4.05–5.25)
SODIUM SERPL-SCNC: 140 MMOL/L (ref 136–145)
WBC # BLD AUTO: 8.9 K/UL (ref 4.3–11.1)

## 2017-11-01 PROCEDURE — 99284 EMERGENCY DEPT VISIT MOD MDM: CPT | Performed by: EMERGENCY MEDICINE

## 2017-11-01 PROCEDURE — 96375 TX/PRO/DX INJ NEW DRUG ADDON: CPT | Performed by: EMERGENCY MEDICINE

## 2017-11-01 PROCEDURE — 80053 COMPREHEN METABOLIC PANEL: CPT | Performed by: EMERGENCY MEDICINE

## 2017-11-01 PROCEDURE — 83690 ASSAY OF LIPASE: CPT | Performed by: EMERGENCY MEDICINE

## 2017-11-01 PROCEDURE — 74011250636 HC RX REV CODE- 250/636: Performed by: EMERGENCY MEDICINE

## 2017-11-01 PROCEDURE — 85025 COMPLETE CBC W/AUTO DIFF WBC: CPT | Performed by: EMERGENCY MEDICINE

## 2017-11-01 PROCEDURE — 81003 URINALYSIS AUTO W/O SCOPE: CPT | Performed by: EMERGENCY MEDICINE

## 2017-11-01 PROCEDURE — 96374 THER/PROPH/DIAG INJ IV PUSH: CPT | Performed by: EMERGENCY MEDICINE

## 2017-11-01 PROCEDURE — 96376 TX/PRO/DX INJ SAME DRUG ADON: CPT | Performed by: EMERGENCY MEDICINE

## 2017-11-01 PROCEDURE — 76705 ECHO EXAM OF ABDOMEN: CPT

## 2017-11-01 RX ORDER — ONDANSETRON 4 MG/1
4 TABLET, ORALLY DISINTEGRATING ORAL
Qty: 6 TAB | Refills: 1 | Status: SHIPPED | OUTPATIENT
Start: 2017-11-01 | End: 2017-11-04

## 2017-11-01 RX ORDER — HYDROCODONE BITARTRATE AND ACETAMINOPHEN 5; 325 MG/1; MG/1
1 TABLET ORAL
Qty: 9 TAB | Refills: 0 | Status: SHIPPED | OUTPATIENT
Start: 2017-11-01 | End: 2017-11-04

## 2017-11-01 RX ORDER — MORPHINE SULFATE 2 MG/ML
4 INJECTION, SOLUTION INTRAMUSCULAR; INTRAVENOUS ONCE
Status: COMPLETED | OUTPATIENT
Start: 2017-11-01 | End: 2017-11-01

## 2017-11-01 RX ORDER — ONDANSETRON 2 MG/ML
4 INJECTION INTRAMUSCULAR; INTRAVENOUS
Status: COMPLETED | OUTPATIENT
Start: 2017-11-01 | End: 2017-11-01

## 2017-11-01 RX ORDER — CEFAZOLIN SODIUM IN 0.9 % NACL 2 G/50 ML
2 INTRAVENOUS SOLUTION, PIGGYBACK (ML) INTRAVENOUS ONCE
Status: CANCELLED | OUTPATIENT
Start: 2017-11-14 | End: 2017-11-14

## 2017-11-01 RX ADMIN — MORPHINE SULFATE 4 MG: 2 INJECTION, SOLUTION INTRAMUSCULAR; INTRAVENOUS at 20:27

## 2017-11-01 RX ADMIN — ONDANSETRON 4 MG: 2 INJECTION INTRAMUSCULAR; INTRAVENOUS at 22:59

## 2017-11-01 RX ADMIN — ONDANSETRON 4 MG: 2 INJECTION INTRAMUSCULAR; INTRAVENOUS at 20:23

## 2017-11-01 NOTE — ED TRIAGE NOTES
Pt c/o right upper abd pain. States been going on for 3 months. Diagnosed with gall bladder issues. States saw Dr. Jordan Parks, to have surgery. States n/v and no appetite.

## 2017-11-02 NOTE — DISCHARGE INSTRUCTIONS
Abdominal Pain: Care Instructions  Your Care Instructions    Abdominal pain has many possible causes. Some aren't serious and get better on their own in a few days. Others need more testing and treatment. If your pain continues or gets worse, you need to be rechecked and may need more tests to find out what is wrong. You may need surgery to correct the problem. Don't ignore new symptoms, such as fever, nausea and vomiting, urination problems, pain that gets worse, and dizziness. These may be signs of a more serious problem. Your doctor may have recommended a follow-up visit in the next 8 to 12 hours. If you are not getting better, you may need more tests or treatment. The doctor has checked you carefully, but problems can develop later. If you notice any problems or new symptoms, get medical treatment right away. Follow-up care is a key part of your treatment and safety. Be sure to make and go to all appointments, and call your doctor if you are having problems. It's also a good idea to know your test results and keep a list of the medicines you take. How can you care for yourself at home? · Rest until you feel better. · To prevent dehydration, drink plenty of fluids, enough so that your urine is light yellow or clear like water. Choose water and other caffeine-free clear liquids until you feel better. If you have kidney, heart, or liver disease and have to limit fluids, talk with your doctor before you increase the amount of fluids you drink. · If your stomach is upset, eat mild foods, such as rice, dry toast or crackers, bananas, and applesauce. Try eating several small meals instead of two or three large ones. · Wait until 48 hours after all symptoms have gone away before you have spicy foods, alcohol, and drinks that contain caffeine. · Do not eat foods that are high in fat. · Avoid anti-inflammatory medicines such as aspirin, ibuprofen (Advil, Motrin), and naproxen (Aleve).  These can cause stomach upset. Talk to your doctor if you take daily aspirin for another health problem. When should you call for help? Call 911 anytime you think you may need emergency care. For example, call if:  ? · You passed out (lost consciousness). ? · You pass maroon or very bloody stools. ? · You vomit blood or what looks like coffee grounds. ? · You have new, severe belly pain. ?Call your doctor now or seek immediate medical care if:  ? · Your pain gets worse, especially if it becomes focused in one area of your belly. ? · You have a new or higher fever. ? · Your stools are black and look like tar, or they have streaks of blood. ? · You have unexpected vaginal bleeding. ? · You have symptoms of a urinary tract infection. These may include:  ¨ Pain when you urinate. ¨ Urinating more often than usual.  ¨ Blood in your urine. ? · You are dizzy or lightheaded, or you feel like you may faint. ? Watch closely for changes in your health, and be sure to contact your doctor if:  ? · You are not getting better after 1 day (24 hours). Where can you learn more? Go to http://ositoNortisashley.info/. Enter H013 in the search box to learn more about \"Abdominal Pain: Care Instructions. \"  Current as of: March 20, 2017  Content Version: 11.4  © 3650-8946 [employer]. Care instructions adapted under license by [employer] (which disclaims liability or warranty for this information). If you have questions about a medical condition or this instruction, always ask your healthcare professional. [place] [address] any warranty or liability for your use of this information. Cholecystectomy: Before Your Surgery  What is cholecystectomy? Cholecystectomy (jb-kym-cvg-HARSHAL-tuh-jane) is a type of surgery. It removes a diseased gallbladder. This surgery is usually done as a laparoscopic surgery.  The doctor puts a lighted tube and other surgical tools through small cuts (incisions) in your belly. The tube is called a scope. It lets your doctor see your organs so he or she can do the surgery. The incisions leave scars that fade with time. Most people go home the same day. You probably will feel better each day. Most people have only a small amount of pain after 1 week. If you have a desk job, you can probably go back to work in 1 to 2 weeks. If you lift heavy objects or have a very active job, it may take up to 4 weeks. In some cases, open surgery is the best choice. Your doctor may choose open surgery in advance. Or he or she may choose it in the middle of laparoscopic surgery. In open surgery, the doctor makes a larger incision in your upper belly. If you have open surgery, you will probably stay in the hospital for 2 to 4 days. And it may take 4 to 6 weeks to get back to your normal routine. Follow-up care is a key part of your treatment and safety. Be sure to make and go to all appointments, and call your doctor if you are having problems. It's also a good idea to know your test results and keep a list of the medicines you take. What happens before surgery? ?Surgery can be stressful. This information will help you understand what you can expect. And it will help you safely prepare for surgery. ? Preparing for surgery  ? · Understand exactly what surgery is planned, along with the risks, benefits, and other options. · Tell your doctors ALL the medicines, vitamins, supplements, and herbal remedies you take. Some of these can increase the risk of bleeding or interact with anesthesia. ? · If you take blood thinners, such as warfarin (Coumadin), clopidogrel (Plavix), or aspirin, be sure to talk to your doctor. He or she will tell you if you should stop taking these medicines before your surgery. Make sure that you understand exactly what your doctor wants you to do.   ? · Your doctor will tell you which medicines to take or stop before your surgery.  You may need to stop taking certain medicines a week or more before surgery. So talk to your doctor as soon as you can.   ? · If you have an advance directive, let your doctor know. It may include a living will and a durable power of  for health care. Bring a copy to the hospital. If you don't have one, you may want to prepare one. It lets your doctor and loved ones know your health care wishes. Doctors advise that everyone prepare these papers before any type of surgery or procedure. ? · You may need to empty your colon with an enema or laxative. Your doctor will tell you how to do this. What happens on the day of surgery? · Follow the instructions exactly about when to stop eating and drinking. If you don't, your surgery may be canceled. If your doctor told you to take your medicines on the day of surgery, take them with only a sip of water. ? · Take a bath or shower before you come in for your surgery. Do not apply lotions, perfumes, deodorants, or nail polish. ? · Do not shave the surgical site yourself. ? · Take off all jewelry and piercings. And take out contact lenses, if you wear them. ? At the hospital or surgery center   · Bring a picture ID. ? · The area for surgery is often marked to make sure there are no errors. ? · You will be kept comfortable and safe by your anesthesia provider. You will be asleep during the surgery. ? · The surgery usually takes 1 to 2 hours. Going home   · Be sure you have someone to drive you home. Anesthesia and pain medicine make it unsafe for you to drive. ? · You will be given more specific instructions about recovering from your surgery. They will cover things like diet, wound care, follow-up care, driving, and getting back to your normal routine. When should you call your doctor? · You have questions or concerns. ? · You don't understand how to prepare for your surgery. ? · You become ill before the surgery (such as fever, flu, or a cold).    ? · You need to reschedule or have changed your mind about having the surgery. Where can you learn more? Go to http://osito-ashley.info/. Enter N995 in the search box to learn more about \"Cholecystectomy: Before Your Surgery. \"  Current as of: May 12, 2017  Content Version: 11.4  © 2423-0308 [employer]. Care instructions adapted under license by [employer] (which disclaims liability or warranty for this information). If you have questions about a medical condition or this instruction, always ask your healthcare professional. [address] any warranty or liability for your use of this information. Biliary Colic: Care Instructions  Your Care Instructions    Biliary (say \"BILL-ee-air-ee\") colic is belly pain caused by gallbladder problems. It is usually caused by a gallstone moving through or blocking the common bile duct or cystic duct. Gallstones are stones that form in the gallbladder. They are made of cholesterol and other substances. The gallbladder is a small sac located just under the liver. It stores bile released by the liver. Bile helps you digest fats. Gallstones also can form in the common bile duct or cystic duct. These ducts carry bile from the gallbladder and the liver to the small intestine. Gallstones may be as small as a grain of sand or as large as a golf ball. Gallstones that cause severe symptoms usually are treated with surgery to remove the gallbladder. If the first attack of biliary colic is mild, it is often safe to wait until you have had another attack before you think about having surgery. The doctor has checked you carefully, but problems can develop later. If you notice any problems or new symptoms, get medical treatment right away. Follow-up care is a key part of your treatment and safety. Be sure to make and go to all appointments, and call your doctor if you are having problems.  It's also a good idea to know your test results and keep a list of the medicines you take. How can you care for yourself at home? · Take pain medicines exactly as directed. ¨ If the doctor gave you a prescription medicine for pain, take it as prescribed. ¨ If you are not taking a prescription pain medicine, ask your doctor if you can take an over-the-counter medicine. Read and follow all instructions on the label. · Avoid foods that cause symptoms, especially fatty foods. These can cause biliary colic. · You may need more tests to look at your gallbladder. When should you call for help? Call your doctor now or seek immediate medical care if:  ? · You have a fever. ? · You have new belly pain, or your pain gets worse. ? · There is a new or increasing yellow tint to your skin or the whites of your eyes. ? · Your urine is dark yellow-brown, or your stools are light-colored or white. ? · You cannot keep down fluids. ? Watch closely for changes in your health, and be sure to contact your doctor if:  ? · You do not get better as expected. ? · You are not getting better after 1 day (24 hours). Where can you learn more? Go to http://osito-ashley.info/. Enter H082 in the search box to learn more about \"Biliary Colic: Care Instructions. \"  Current as of: May 12, 2017  Content Version: 11.4  © 1715-3293 [employer]. Care instructions adapted under license by [employer] (which disclaims liability or warranty for this information). If you have questions about a medical condition or this instruction, always ask your healthcare professional. [place] [address] any warranty or liability for your use of this information.

## 2017-11-02 NOTE — ED PROVIDER NOTES
HPI Comments: 39-year-old female with past history of GERD presents with complaint of progressively worsening nonradiating sharp, constant right upper quadrant pain early course the past several days. States that she's been having these symptoms intermittently over the past 3 months. Patient followed up with Dr. Marcy Boone around 2 weeks ago and underwent HIDA scan; EF ~27%. Decision was made for patient to undergo cholecystectomy on 11/14/2017. States that she was informed to present to the ER for symptoms worsened or progressed in any way. States she's had worsening nausea, vomiting. Reports decreased appetite. Patient denies fever, chills, dysuria, melena, hematochezia, constipation. Reports intermittent diarrhea. Patient is a 47 y.o. female presenting with abdominal pain. The history is provided by the patient. No  was used. Abdominal Pain    This is a recurrent problem. The current episode started more than 1 week ago. The problem occurs constantly. The problem has not changed since onset. The pain is associated with vomiting. The pain is located in the RUQ. The quality of the pain is sharp and colicky. The pain is at a severity of 4/10. Associated symptoms include nausea and vomiting. Pertinent negatives include no anorexia, no fever, no belching, no diarrhea, no flatus, no hematochezia, no melena, no constipation, no dysuria, no frequency, no hematuria, no headaches, no chest pain and no back pain. The pain is worsened by eating and palpation. The pain is relieved by nothing. Past workup includes CT scan, ultrasound. Her past medical history is significant for gallstones and kidney stones. Her past medical history does not include ulcerative colitis, Crohn's disease or pancreatitis.         Past Medical History:   Diagnosis Date    Arthritis     Gastrointestinal disorder     GERD, hiatal hernia    GERD (gastroesophageal reflux disease)     Kidney stone     Migraines     Pyelonephritis, acute 6/30/2011    Pyelonephritis, acute        Past Surgical History:   Procedure Laterality Date    HX GYN      hysterectomy    HX OTHER SURGICAL      kidney stone surgery         History reviewed. No pertinent family history. Social History     Social History    Marital status:      Spouse name: N/A    Number of children: N/A    Years of education: N/A     Occupational History    Not on file. Social History Main Topics    Smoking status: Current Every Day Smoker     Packs/day: 1.00     Years: 30.00    Smokeless tobacco: Never Used    Alcohol use No    Drug use: No    Sexual activity: Not Currently     Other Topics Concern    Not on file     Social History Narrative         ALLERGIES: Darvocet-n 100 [propoxyphene n-acetaminophen] and Toradol [ketorolac tromethamine]    Review of Systems   Constitutional: Negative for chills, fatigue and fever. HENT: Negative for congestion and rhinorrhea. Respiratory: Negative for cough and shortness of breath. Cardiovascular: Negative for chest pain, palpitations and leg swelling. Gastrointestinal: Positive for abdominal pain, nausea and vomiting. Negative for abdominal distention, anal bleeding, anorexia, constipation, diarrhea, flatus, hematochezia and melena. Genitourinary: Negative for dysuria, flank pain, frequency, hematuria, pelvic pain, vaginal bleeding and vaginal discharge. Musculoskeletal: Negative for back pain, gait problem, joint swelling, neck pain and neck stiffness. Skin: Negative for pallor and rash. Neurological: Negative for dizziness, syncope, weakness and headaches. Vitals:    11/01/17 1930   BP: 124/82   Pulse: 99   Resp: 20   Temp: 97.9 °F (36.6 °C)   SpO2: 97%   Weight: 57.6 kg (127 lb)   Height: 5' 3\" (1.6 m)            Physical Exam   Constitutional: She is oriented to person, place, and time. She appears well-developed and well-nourished. No distress.    HENT:   Head: Normocephalic and atraumatic. Mouth/Throat: Oropharynx is clear and moist. No oropharyngeal exudate. Eyes: Conjunctivae and EOM are normal. Pupils are equal, round, and reactive to light. Neck: Normal range of motion. No JVD present. No tracheal deviation present. Cardiovascular: Normal rate, regular rhythm, normal heart sounds and intact distal pulses. No murmur heard. Pulmonary/Chest: Effort normal and breath sounds normal. No respiratory distress. She has no wheezes. She has no rales. She exhibits no tenderness. Abdominal: Soft. Bowel sounds are normal. She exhibits no distension and no mass. There is tenderness. There is no rebound and no guarding. Mild RUQ TTP. Negative Lang's sign. No CVAT. Musculoskeletal: Normal range of motion. She exhibits no edema, tenderness or deformity. Neurological: She is alert and oriented to person, place, and time. No cranial nerve deficit. Coordination normal.   Skin: Skin is warm and dry. No rash noted. No erythema. No pallor. Psychiatric: She has a normal mood and affect. Her behavior is normal.   Nursing note and vitals reviewed. MDM  Number of Diagnoses or Management Options  Calculus of gallbladder without cholecystitis without obstruction: new and requires workup  Recurrent biliary colic: new and requires workup  RUQ pain: new and requires workup  Diagnosis management comments: Patient presents with recurrent biliary colic. Mild to moderate right upper quadrant tenderness on exam.  Vital signs stable. Patient afebrile. WBC within normal limits. Normal LFTs. Lipase normal. UA negative for UTI. Pain and nausea well controlled. US w/ findings as mentioned below; no evidence of acute cholecystitis. Dr. Carole Godoy on call for General Surgery. States to d/c home w/ pain control and antiemetic and have patient call office in am and surgery will be moved up sooner. Informed patient and she is in agreement with plan.         Amount and/or Complexity of Data Reviewed  Clinical lab tests: ordered and reviewed  Tests in the radiology section of CPT®: ordered and reviewed  Tests in the medicine section of CPT®: ordered and reviewed  Review and summarize past medical records: yes  Independent visualization of images, tracings, or specimens: yes    Risk of Complications, Morbidity, and/or Mortality  Presenting problems: moderate  Diagnostic procedures: moderate  Management options: moderate    Patient Progress  Patient progress: stable    ED Course   Comment By Time   US RUQ IMPRESSION:   1. Distended gallbladder with tiny stones and/or sludge. 2. No biliary dilatation.   3. Right renal cyst. Frank Christianson MD 11/01 2158       Procedures

## 2017-11-02 NOTE — ED NOTES
Report to [facility]. Tonja Mahoneyi RN to assume care of this pt at this time.

## 2017-11-07 ENCOUNTER — HOSPITAL ENCOUNTER (OUTPATIENT)
Dept: SURGERY | Age: 52
Discharge: HOME | End: 2017-11-07

## 2017-11-07 VITALS
BODY MASS INDEX: 23.61 KG/M2 | DIASTOLIC BLOOD PRESSURE: 55 MMHG | TEMPERATURE: 98.3 F | SYSTOLIC BLOOD PRESSURE: 120 MMHG | WEIGHT: 133.25 LBS | OXYGEN SATURATION: 98 % | HEIGHT: 63 IN | RESPIRATION RATE: 18 BRPM | HEART RATE: 67 BPM

## 2017-11-07 RX ORDER — CEFAZOLIN SODIUM IN 0.9 % NACL 2 G/50 ML
2 INTRAVENOUS SOLUTION, PIGGYBACK (ML) INTRAVENOUS ONCE
Status: CANCELLED | OUTPATIENT
Start: 2017-11-14 | End: 2017-11-14

## 2017-11-07 NOTE — PERIOP NOTES
Patient verified name, , and surgery as listed in [facility]. Patient provided medical/health information and PTA medications to the best of their ability. TYPE  CASE:2  Orders per surgeon: Received   Labs per surgeon:none. Labs per anesthesia protocol: K+. Results 17  EKG  :  Not needed at time of PAT    Patient provided with and instructed on education handouts including Guide to Surgery, blood transfusions, pain management, and hand hygiene for the family and community, and [facility] brochure. soap and instructions given per hospital policy. Instructed patient to continue previous medications as prescribed prior to surgery unless otherwise directed and to take the following medications the day of surgery according to anesthesia guidelines : Omeprazole, Phenergan . Instructed patient to hold  the following medications: Celebrex. Original medication prescription bottles not visualized during patient appointment. Patient teach back successful and patient demonstrates knowledge of instruction.

## 2017-11-13 ENCOUNTER — ANESTHESIA EVENT (OUTPATIENT)
Dept: SURGERY | Age: 52
End: 2017-11-13
Payer: SUBSIDIZED

## 2017-11-14 ENCOUNTER — ANESTHESIA (OUTPATIENT)
Dept: SURGERY | Age: 52
End: 2017-11-14
Payer: SUBSIDIZED

## 2017-11-14 ENCOUNTER — HOSPITAL ENCOUNTER (OUTPATIENT)
Age: 52
Setting detail: OUTPATIENT SURGERY
Discharge: HOME | End: 2017-11-14
Attending: SURGERY | Admitting: SURGERY
Payer: SUBSIDIZED

## 2017-11-14 VITALS
BODY MASS INDEX: 23.18 KG/M2 | RESPIRATION RATE: 17 BRPM | WEIGHT: 130.8 LBS | DIASTOLIC BLOOD PRESSURE: 55 MMHG | OXYGEN SATURATION: 93 % | SYSTOLIC BLOOD PRESSURE: 108 MMHG | TEMPERATURE: 98.4 F | HEIGHT: 63 IN | HEART RATE: 72 BPM

## 2017-11-14 PROCEDURE — 77030008518 HC TBNG INSUF ENDO STRY -B: Performed by: SURGERY

## 2017-11-14 PROCEDURE — 76210000006 HC OR PH I REC 0.5 TO 1 HR: Performed by: SURGERY

## 2017-11-14 PROCEDURE — 77030000038 HC TIP SCIS LAPSCP SURI -B: Performed by: SURGERY

## 2017-11-14 PROCEDURE — 77030008522 HC TBNG INSUF LAPRO STRY -B: Performed by: SURGERY

## 2017-11-14 PROCEDURE — 74011250636 HC RX REV CODE- 250/636

## 2017-11-14 PROCEDURE — 77030010507 HC ADH SKN DERMBND J&J -B: Performed by: SURGERY

## 2017-11-14 PROCEDURE — 88304 TISSUE EXAM BY PATHOLOGIST: CPT | Performed by: SURGERY

## 2017-11-14 PROCEDURE — 77030035051: Performed by: SURGERY

## 2017-11-14 PROCEDURE — 74011000250 HC RX REV CODE- 250: Performed by: ANESTHESIOLOGY

## 2017-11-14 PROCEDURE — 77030008477 HC STYL SATN SLP COVD -A: Performed by: NURSE ANESTHETIST, CERTIFIED REGISTERED

## 2017-11-14 PROCEDURE — 77030011640 HC PAD GRND REM COVD -A: Performed by: SURGERY

## 2017-11-14 PROCEDURE — 74011250636 HC RX REV CODE- 250/636: Performed by: ANESTHESIOLOGY

## 2017-11-14 PROCEDURE — 77030031139 HC SUT VCRL2 J&J -A: Performed by: SURGERY

## 2017-11-14 PROCEDURE — 77030035048 HC TRCR ENDOSC OPTCL COVD -B: Performed by: SURGERY

## 2017-11-14 PROCEDURE — 77030012022 HC APPL CLP ENDOSC COVD -C: Performed by: SURGERY

## 2017-11-14 PROCEDURE — 77030008756 HC TU IRR SUC STRY -B: Performed by: SURGERY

## 2017-11-14 PROCEDURE — 74011250637 HC RX REV CODE- 250/637: Performed by: ANESTHESIOLOGY

## 2017-11-14 PROCEDURE — 77030032490 HC SLV COMPR SCD KNE COVD -B: Performed by: SURGERY

## 2017-11-14 PROCEDURE — 76060000033 HC ANESTHESIA 1 TO 1.5 HR: Performed by: SURGERY

## 2017-11-14 PROCEDURE — 77030019908 HC STETH ESOPH SIMS -A: Performed by: NURSE ANESTHETIST, CERTIFIED REGISTERED

## 2017-11-14 PROCEDURE — 76210000021 HC REC RM PH II 0.5 TO 1 HR: Performed by: SURGERY

## 2017-11-14 PROCEDURE — 77030035220 HC TRCR ENDOSC BLNTPRT ANCHR COVD -B: Performed by: SURGERY

## 2017-11-14 PROCEDURE — 74011000250 HC RX REV CODE- 250

## 2017-11-14 PROCEDURE — 77030008703 HC TU ET UNCUF COVD -A: Performed by: NURSE ANESTHETIST, CERTIFIED REGISTERED

## 2017-11-14 PROCEDURE — 76010000160 HC OR TIME 0.5 TO 1 HR INTENSV-TIER 1: Performed by: SURGERY

## 2017-11-14 PROCEDURE — 77030002933 HC SUT MCRYL J&J -A: Performed by: SURGERY

## 2017-11-14 PROCEDURE — 77030020782 HC GWN BAIR PAWS FLX 3M -B: Performed by: NURSE ANESTHETIST, CERTIFIED REGISTERED

## 2017-11-14 RX ORDER — FAMOTIDINE 20 MG/1
20 TABLET, FILM COATED ORAL ONCE
Status: COMPLETED | OUTPATIENT
Start: 2017-11-14 | End: 2017-11-14

## 2017-11-14 RX ORDER — OXYCODONE AND ACETAMINOPHEN 7.5; 325 MG/1; MG/1
1 TABLET ORAL
Qty: 25 TAB | Refills: 0 | Status: SHIPPED | OUTPATIENT
Start: 2017-11-14 | End: 2018-07-31

## 2017-11-14 RX ORDER — SUCCINYLCHOLINE CHLORIDE 20 MG/ML
INJECTION INTRAMUSCULAR; INTRAVENOUS AS NEEDED
Status: DISCONTINUED | OUTPATIENT
Start: 2017-11-14 | End: 2017-11-14

## 2017-11-14 RX ORDER — ESMOLOL HYDROCHLORIDE 10 MG/ML
INJECTION INTRAVENOUS AS NEEDED
Status: DISCONTINUED | OUTPATIENT
Start: 2017-11-14 | End: 2017-11-14 | Stop reason: HOSPADM

## 2017-11-14 RX ORDER — CEFAZOLIN SODIUM IN 0.9 % NACL 2 G/50 ML
2 INTRAVENOUS SOLUTION, PIGGYBACK (ML) INTRAVENOUS ONCE
Status: COMPLETED | OUTPATIENT
Start: 2017-11-14 | End: 2017-11-14

## 2017-11-14 RX ORDER — GLYCOPYRROLATE 0.2 MG/ML
INJECTION INTRAMUSCULAR; INTRAVENOUS AS NEEDED
Status: DISCONTINUED | OUTPATIENT
Start: 2017-11-14 | End: 2017-11-14 | Stop reason: HOSPADM

## 2017-11-14 RX ORDER — LIDOCAINE HYDROCHLORIDE 10 MG/ML
0.1 INJECTION INFILTRATION; PERINEURAL AS NEEDED
Status: DISCONTINUED | OUTPATIENT
Start: 2017-11-14 | End: 2017-11-14 | Stop reason: HOSPADM

## 2017-11-14 RX ORDER — ROCURONIUM BROMIDE 10 MG/ML
INJECTION, SOLUTION INTRAVENOUS AS NEEDED
Status: DISCONTINUED | OUTPATIENT
Start: 2017-11-14 | End: 2017-11-14 | Stop reason: HOSPADM

## 2017-11-14 RX ORDER — DEXAMETHASONE SODIUM PHOSPHATE 4 MG/ML
INJECTION, SOLUTION INTRA-ARTICULAR; INTRALESIONAL; INTRAMUSCULAR; INTRAVENOUS; SOFT TISSUE AS NEEDED
Status: DISCONTINUED | OUTPATIENT
Start: 2017-11-14 | End: 2017-11-14 | Stop reason: HOSPADM

## 2017-11-14 RX ORDER — SODIUM CHLORIDE, SODIUM LACTATE, POTASSIUM CHLORIDE, CALCIUM CHLORIDE 600; 310; 30; 20 MG/100ML; MG/100ML; MG/100ML; MG/100ML
150 INJECTION, SOLUTION INTRAVENOUS CONTINUOUS
Status: DISCONTINUED | OUTPATIENT
Start: 2017-11-14 | End: 2017-11-14 | Stop reason: HOSPADM

## 2017-11-14 RX ORDER — SODIUM CHLORIDE 9 MG/ML
50 INJECTION, SOLUTION INTRAVENOUS CONTINUOUS
Status: DISCONTINUED | OUTPATIENT
Start: 2017-11-14 | End: 2017-11-14 | Stop reason: HOSPADM

## 2017-11-14 RX ORDER — FENTANYL CITRATE 50 UG/ML
INJECTION, SOLUTION INTRAMUSCULAR; INTRAVENOUS AS NEEDED
Status: DISCONTINUED | OUTPATIENT
Start: 2017-11-14 | End: 2017-11-14 | Stop reason: HOSPADM

## 2017-11-14 RX ORDER — SODIUM CHLORIDE 0.9 % (FLUSH) 0.9 %
5-10 SYRINGE (ML) INJECTION AS NEEDED
Status: DISCONTINUED | OUTPATIENT
Start: 2017-11-14 | End: 2017-11-14 | Stop reason: HOSPADM

## 2017-11-14 RX ORDER — APREPITANT 40 MG/1
40 CAPSULE ORAL ONCE
Status: COMPLETED | OUTPATIENT
Start: 2017-11-14 | End: 2017-11-14

## 2017-11-14 RX ORDER — LIDOCAINE HYDROCHLORIDE 20 MG/ML
INJECTION, SOLUTION EPIDURAL; INFILTRATION; INTRACAUDAL; PERINEURAL AS NEEDED
Status: DISCONTINUED | OUTPATIENT
Start: 2017-11-14 | End: 2017-11-14 | Stop reason: HOSPADM

## 2017-11-14 RX ORDER — ACETAMINOPHEN 500 MG
1000 TABLET ORAL
Status: DISCONTINUED | OUTPATIENT
Start: 2017-11-14 | End: 2017-11-14 | Stop reason: HOSPADM

## 2017-11-14 RX ORDER — HYDROMORPHONE HYDROCHLORIDE 1 MG/ML
1 INJECTION, SOLUTION INTRAMUSCULAR; INTRAVENOUS; SUBCUTANEOUS ONCE
Status: COMPLETED | OUTPATIENT
Start: 2017-11-14 | End: 2017-11-14

## 2017-11-14 RX ORDER — SODIUM CHLORIDE 0.9 % (FLUSH) 0.9 %
5-10 SYRINGE (ML) INJECTION EVERY 8 HOURS
Status: DISCONTINUED | OUTPATIENT
Start: 2017-11-14 | End: 2017-11-14 | Stop reason: HOSPADM

## 2017-11-14 RX ORDER — FENTANYL CITRATE 50 UG/ML
100 INJECTION, SOLUTION INTRAMUSCULAR; INTRAVENOUS ONCE
Status: DISCONTINUED | OUTPATIENT
Start: 2017-11-14 | End: 2017-11-14 | Stop reason: HOSPADM

## 2017-11-14 RX ORDER — HYDROMORPHONE HYDROCHLORIDE 2 MG/ML
0.5 INJECTION, SOLUTION INTRAMUSCULAR; INTRAVENOUS; SUBCUTANEOUS
Status: DISCONTINUED | OUTPATIENT
Start: 2017-11-14 | End: 2017-11-14 | Stop reason: HOSPADM

## 2017-11-14 RX ORDER — ONDANSETRON 2 MG/ML
INJECTION INTRAMUSCULAR; INTRAVENOUS AS NEEDED
Status: DISCONTINUED | OUTPATIENT
Start: 2017-11-14 | End: 2017-11-14 | Stop reason: HOSPADM

## 2017-11-14 RX ORDER — NEOSTIGMINE METHYLSULFATE 1 MG/ML
INJECTION INTRAVENOUS AS NEEDED
Status: DISCONTINUED | OUTPATIENT
Start: 2017-11-14 | End: 2017-11-14 | Stop reason: HOSPADM

## 2017-11-14 RX ORDER — PROPOFOL 10 MG/ML
INJECTION, EMULSION INTRAVENOUS AS NEEDED
Status: DISCONTINUED | OUTPATIENT
Start: 2017-11-14 | End: 2017-11-14 | Stop reason: HOSPADM

## 2017-11-14 RX ORDER — MIDAZOLAM HYDROCHLORIDE 1 MG/ML
2 INJECTION, SOLUTION INTRAMUSCULAR; INTRAVENOUS
Status: DISCONTINUED | OUTPATIENT
Start: 2017-11-14 | End: 2017-11-14 | Stop reason: HOSPADM

## 2017-11-14 RX ORDER — HYDROCODONE BITARTRATE AND ACETAMINOPHEN 5; 325 MG/1; MG/1
1 TABLET ORAL AS NEEDED
Status: DISCONTINUED | OUTPATIENT
Start: 2017-11-14 | End: 2017-11-14 | Stop reason: HOSPADM

## 2017-11-14 RX ADMIN — APREPITANT 40 MG: 40 CAPSULE ORAL at 11:54

## 2017-11-14 RX ADMIN — GLYCOPYRROLATE 0.4 MG: 0.2 INJECTION INTRAMUSCULAR; INTRAVENOUS at 13:16

## 2017-11-14 RX ADMIN — ONDANSETRON 4 MG: 2 INJECTION INTRAMUSCULAR; INTRAVENOUS at 13:10

## 2017-11-14 RX ADMIN — HYDROCODONE BITARTRATE AND ACETAMINOPHEN 1 TABLET: 5; 325 TABLET ORAL at 13:49

## 2017-11-14 RX ADMIN — SODIUM CHLORIDE, SODIUM LACTATE, POTASSIUM CHLORIDE, AND CALCIUM CHLORIDE 150 ML/HR: 600; 310; 30; 20 INJECTION, SOLUTION INTRAVENOUS at 11:02

## 2017-11-14 RX ADMIN — HYDROMORPHONE HYDROCHLORIDE 0.5 MG: 2 INJECTION, SOLUTION INTRAMUSCULAR; INTRAVENOUS; SUBCUTANEOUS at 13:34

## 2017-11-14 RX ADMIN — HYDROMORPHONE HYDROCHLORIDE 0.5 MG: 2 INJECTION, SOLUTION INTRAMUSCULAR; INTRAVENOUS; SUBCUTANEOUS at 13:39

## 2017-11-14 RX ADMIN — FAMOTIDINE 20 MG: 20 TABLET, FILM COATED ORAL at 11:02

## 2017-11-14 RX ADMIN — HYDROMORPHONE HYDROCHLORIDE 1 MG: 1 INJECTION, SOLUTION INTRAMUSCULAR; INTRAVENOUS; SUBCUTANEOUS at 11:54

## 2017-11-14 RX ADMIN — PROMETHAZINE HYDROCHLORIDE 3.25 MG: 25 INJECTION INTRAMUSCULAR; INTRAVENOUS at 14:01

## 2017-11-14 RX ADMIN — LIDOCAINE HYDROCHLORIDE 100 MG: 20 INJECTION, SOLUTION EPIDURAL; INFILTRATION; INTRACAUDAL; PERINEURAL at 12:43

## 2017-11-14 RX ADMIN — PROPOFOL 150 MG: 10 INJECTION, EMULSION INTRAVENOUS at 12:43

## 2017-11-14 RX ADMIN — NEOSTIGMINE METHYLSULFATE 3 MG: 1 INJECTION INTRAVENOUS at 13:16

## 2017-11-14 RX ADMIN — DEXAMETHASONE SODIUM PHOSPHATE 4 MG: 4 INJECTION, SOLUTION INTRA-ARTICULAR; INTRALESIONAL; INTRAMUSCULAR; INTRAVENOUS; SOFT TISSUE at 13:10

## 2017-11-14 RX ADMIN — ROCURONIUM BROMIDE 40 MG: 10 INJECTION, SOLUTION INTRAVENOUS at 12:43

## 2017-11-14 RX ADMIN — ESMOLOL HYDROCHLORIDE 30 MG: 10 INJECTION INTRAVENOUS at 13:25

## 2017-11-14 RX ADMIN — CEFAZOLIN 2 G: 1 INJECTION, POWDER, FOR SOLUTION INTRAMUSCULAR; INTRAVENOUS; PARENTERAL at 12:31

## 2017-11-14 RX ADMIN — FENTANYL CITRATE 100 MCG: 50 INJECTION, SOLUTION INTRAMUSCULAR; INTRAVENOUS at 12:43

## 2017-11-14 RX ADMIN — HYDROMORPHONE HYDROCHLORIDE 0.5 MG: 2 INJECTION, SOLUTION INTRAMUSCULAR; INTRAVENOUS; SUBCUTANEOUS at 13:50

## 2017-11-14 NOTE — BRIEF OP NOTE
BRIEF OPERATIVE NOTE    Date of Procedure: 11/14/2017   Preoperative Diagnosis: Cholecystitis, chronic [K81.1]  Postoperative Diagnosis: Cholecystitis, chronic [K81.1]    Procedure(s):  CHOLECYSTECTOMY LAPAROSCOPIC  Surgeon(s) and Role:      Tony Dickens MD - Primary         Assistant Staff:       Surgical Staff:  Circ-1: Aye Glaser RN  Scrub Tech-1: Jeremías Johns  Scrub Tech-2: Edwina Pina  Event Time In   Incision Start 1250   Incision Close      Anesthesia: General   Estimated Blood Loss: minimal  Specimens:   ID Type Source Tests Collected by Time Destination   1 : Gallbladder Routine Gallbladder  Tung Sarmiento MD 11/14/2017 1310 Pathology      Findings: see op note   Complications: none  Implants: * No implants in log *

## 2017-11-14 NOTE — OP NOTES
[address] 64   OPERATIVE REPORT       Name:  eHnny Garcia   MR#:  [id number]   :  1965   Account #:  [de-identified]   Date of Adm:  2017       DATE OF SURGERY: 2017. PREOPERATIVE DIAGNOSIS: Cholecystitis. POSTOPERATIVE DIAGNOSIS: Cholecystitis. NAME OF PROCEDURE: Laparoscopic cholecystectomy. SURGEON: Polo Mack. Padmini Marte MD.    ANESTHESIA: General.    COMPLICATIONS: None. COUNTS: Correct. ESTIMATED BLOOD LOSS: Minimal.    SPECIMEN: Gallbladder. DESCRIPTION OF PROCEDURE: After the patient was asleep, her   abdomen was prepped and draped in sterile fashion. The incision   was made just above the umbilicus in the midline. The fascia was   then opened and several 0 Vicryl stitches were placed. At this   point, the single site diaphragm was then inserted with a Joel Randy. Insufflation was then acquired. Scope was then introduced, and   it was seen that the long trocars would be utilized. The robot   was then brought in and docked to the camera. The #2 port was   then placed under direct visualization. This was then docked. The #1 port was also placed and then docked. The assistant's   port was then inserted. I then broke scrub and sat at the   console. The gallbladder was grasped by the assistant and tented   upwards. I began dissection and dissected out the cystic duct   completely. Two hemoclips were placed distally, 1 proximally,   and the cystic duct divided. Bovie was then utilized to divide   the tissue, used to elevate and free the gallbladder. The cystic   artery was identified. This was doubly hemoclipped and then   divided. Gallbladder was then removed from the gallbladder bed. The area was inspected, and there was no bleeding. At this time,   I got up from the console, went and scrubbed and came back. All   trocars were removed, and the diaphragm removed with the   specimen. The gallbladder was sent to pathology.  Interrupted 0   Vicryl was used to close the fascia, 4-0 subcuticular Monocryl   and Dermabond were utilized to close the skin. The patient   tolerated the procedure well. MD RAQUEL Orellana / Alize Gomez   D:  11/14/2017   13:20   T:  11/14/2017   13:48   Job #:  [id number]

## 2017-11-14 NOTE — PROGRESS NOTES
Pre-op prayer request received. Prayer and support given to patient and mother. Rev.  [employer]

## 2017-11-14 NOTE — H&P
Gallbladder History and Physical    Subjective:     Patient is a 47 y.o.  female who presents with abdominal pain. Onset of symptoms was abrupt with unchanged course since that time. The pain is located in the RUQ without radiation. Patient describes the pain as dull and aching, intermittent. Additional biliary/liver symptoms include none. Pancreatic symptoms include none. Previous studies include ultrasound which was normal.HIDA with GBEF of 27% and recreation of her symptoms. Patient Active Problem List    Diagnosis Date Noted    Right upper quadrant abdominal pain 10/17/2017    Calculus of ureter 06/30/2011    Pyelonephritis, acute 06/30/2011    Migraines      Past Medical History:   Diagnosis Date    Arthritis     Gastrointestinal disorder     GERD, hiatal hernia    GERD (gastroesophageal reflux disease)     Kidney stone     Migraines     Pyelonephritis, acute 6/30/2011    Pyelonephritis, acute       Past Surgical History:   Procedure Laterality Date    HX GYN      hysterectomy    HX OTHER SURGICAL      kidney stone surgery     Social History   Substance Use Topics    Smoking status: Current Every Day Smoker     Packs/day: 1.00     Years: 30.00    Smokeless tobacco: Never Used    Alcohol use No      Family History   Problem Relation Age of Onset    Diabetes Mother     No Known Problems Father        Prior to Admission medications    Medication Sig Start Date End Date Taking? Authorizing Provider   promethazine (PHENERGAN) 25 mg tablet Take 1 Tab by mouth every six (6) hours as needed for Nausea. 10/8/17  Yes Shey Winters,    omeprazole (PRILOSEC) 20 mg capsule Take 20 mg by mouth daily. Yes Alyssia Fitzpatrick MD   celecoxib (CELEBREX) 200 mg capsule Take  by mouth two (2) times a day. Yes Alyssia Fitzpatrick MD   P-EPHED HCL/ACETAMINOPHEN (TYLENOL SINUS PO) Take  by mouth as needed.  6/18/10  Yes Alyssia Fitzpatrick MD     Allergies   Allergen Reactions    Darvocet-N 100 [Propoxyphene N-Acetaminophen] Nausea and Vomiting    Toradol [Ketorolac Tromethamine] Rash     Pt given IV Toradol in November 2009 with NO resulting rash. Patient did not want to take Toradol on 6/25/11 admission due to the reaction she had in 2008. Review of Systems   Gastrointestinal: Positive for abdominal pain. All other systems reviewed and are negative. Objective:     Patient Vitals for the past 8 hrs:   BP Temp Pulse Resp SpO2 Height Weight   11/14/17 1047 105/55 97.6 °F (36.4 °C) 85 18 98 % 5' 3\" (1.6 m) 130 lb 12.8 oz (59.3 kg)       Physical Exam   Constitutional: She is oriented to person, place, and time. She appears well-developed and well-nourished. No distress. HENT:   Head: Normocephalic and atraumatic. Eyes: Conjunctivae and EOM are normal. Pupils are equal, round, and reactive to light. Neck: Normal range of motion. Neck supple. Cardiovascular: Normal rate, regular rhythm and normal heart sounds. Pulmonary/Chest: Effort normal and breath sounds normal. No respiratory distress. She has no wheezes. Abdominal: Soft. Bowel sounds are normal. She exhibits no distension and no mass. There is no tenderness. There is no rebound and no guarding. Musculoskeletal: Normal range of motion. Neurological: She is alert and oriented to person, place, and time. Skin: Skin is warm and dry. She is not diaphoretic. Psychiatric: She has a normal mood and affect. Her behavior is normal. Judgment and thought content normal.       Imaging and Lab Review:     No results found for this or any previous visit (from the past 24 hour(s)). images and reports reviewed    Assessment:     Acute cholecystitis. Patient has failed conservative therapy and wishes to proceed with surgical intervention.     Plan/Recommendations/Medical Decision Making:     Discussed the risk of surgery including infection, hematoma, bleeding, bile duct or bowel injury, recurrence or persistence of symptoms or bile leak, and the risks of general anesthetic. The patient understands the risk that the procedure could be an open procedure. The patient understands the risks; any and all questions were answered to the patient's satisfaction, and they freely signed the consent for operation.      Signed By: Sallie Oliver MD     November 14, 2017

## 2017-11-14 NOTE — ANESTHESIA POSTPROCEDURE EVALUATION
Post-Anesthesia Evaluation and Assessment    Patient: Yosef Melgar MRN: [id number]  SSN: [id number]    YOB: 1965  Age: 47 y.o. Sex: female       Cardiovascular Function/Vital Signs  Visit Vitals    /55    Pulse 72    Temp 36.9 °C (98.4 °F)    Resp 17    Ht 5' 3\" (1.6 m)    Wt 59.3 kg (130 lb 12.8 oz)    SpO2 93%    BMI 23.17 kg/m2       Patient is status post general anesthesia for Procedure(s):  CHOLECYSTECTOMY LAPAROSCOPIC. Nausea/Vomiting: None    Postoperative hydration reviewed and adequate. Pain:  Pain Scale 1: Visual (11/14/17 1417)  Pain Intensity 1: 0 (11/14/17 1417)   Managed    Neurological Status:   Neuro (WDL): Within Defined Limits (11/14/17 1417)   At baseline    Mental Status and Level of Consciousness: Arousable    Pulmonary Status:   O2 Device: Room air (11/14/17 1417)   Adequate oxygenation and airway patent    Complications related to anesthesia: None    Post-anesthesia assessment completed.  No concerns    Signed By: Osbaldo Orlando MD     November 14, 2017

## 2017-11-14 NOTE — IP AVS SNAPSHOT
[address] [address] 
[phone] Patient: Jenifer Gongora MRN: [id number] Marquita Vincent About your hospitalization You were admitted on:  November 14, 2017 You last received care in the:  [facility] PACU You were discharged on:  November 14, 2017 Why you were hospitalized Your primary diagnosis was:  Not on File Things You Need To Do (next 8 weeks) Follow up with None Where:  None (395) Patient stated that they have no PCP Tuesday Nov 21, 2017 Global Post Op with Cesar Garcia MD at 10:45 AM  
Where:  [address] [place] [address] ([facility]) Discharge Orders None A check vic indicates which time of day the medication should be taken. My Medications STOP taking these medications CeleBREX 200 mg capsule Generic drug:  celecoxib TYLENOL SINUS PO  
   
  
  
TAKE these medications as instructed Instructions Each Dose to Equal  
 Morning Noon Evening Bedtime  
 oxyCODONE-acetaminophen 7.5-325 mg per tablet Commonly known as:  PERCOCET Your last dose was: Your next dose is: Take 1 Tab by mouth every four (4) hours as needed for Pain. Max Daily Amount: 6 Tabs. 1 Tab PriLOSEC 20 mg capsule Generic drug:  omeprazole Your last dose was: Your next dose is: Take 20 mg by mouth daily. 20 mg  
    
   
   
   
  
 promethazine 25 mg tablet Commonly known as:  PHENERGAN Your last dose was: Your next dose is: Take 1 Tab by mouth every six (6) hours as needed for Nausea. 25 mg Where to Get Your Medications Information on where to get these meds will be given to you by the nurse or doctor. ! Ask your nurse or doctor about these medications  
  oxyCODONE-acetaminophen 7.5-325 mg per tablet Discharge Instructions Cholecystectomy: What to Expect at [facility] Your Recovery After your surgery, it is normal to feel weak and tired for several days after you return home. Your belly may be swollen. Since you had laparoscopic surgery, you may also have pain in your shoulder for about 24 hours. You may have gas or need to burp a lot at first, and a few people get diarrhea. The diarrhea usually goes away in 2 to 4 weeks, but it may last longer. For a laparoscopic surgery, most people can go back to work or their normal routine in 1 to 2 weeks, but it may take longer, depending on the type of work you do. This care sheet gives you a general idea about how long it will take for you to recover. However, each person recovers at a different pace. Follow the steps below to get better as quickly as possible. How can you care for yourself at home? Activity · Rest when you feel tired. Getting enough sleep will help you recover. · Try to walk each day. Start out by walking a little more than you did the day before. Gradually increase the amount you walk. Walking boosts blood flow and helps prevent pneumonia and constipation. · For about 2 to 4 weeks, avoid lifting anything that would make you strain or anything over 10 pounds. · Avoid strenuous activities, such as biking, jogging, weightlifting, and aerobic exercise, until your doctor says it is okay. · You may shower 24 hours after surgery, if your doctor okays it. Pat the cut (incision) dry. Do not take a bath until your doctor tells you it is okay. · You may drive when you are no longer taking pain medicine and can quickly move your foot from the gas pedal to the brake. You must also be able to sit comfortably for a long period of time, even if you do not plan to go far. You might get caught in traffic. · Your doctor will tell you when you can have sex again. Diet · Eat smaller meals more often instead of fewer larger meals.  You can eat a normal diet, but avoid eating fatty foods for about 1 month. Fatty foods include hamburger, whole milk, cheese, and many snack foods. If your stomach is upset, try bland, low-fat foods like plain rice, broiled chicken, toast, and yogurt. · Drink plenty of fluids (unless your doctor tells you not to). · If you have diarrhea, try avoiding spicy foods, dairy products, fatty foods, and alcohol. You can also watch to see if specific foods cause it, and stop eating them. If the diarrhea continues for more than 2 weeks, talk to your doctor. · You may notice that your bowel movements are not regular right after your surgery. This is common. Try to avoid constipation and straining with bowel movements. You may want to take a fiber supplement every day. If you have not had a bowel movement after a couple of days, ask your doctor about taking a mild laxative. Medicines · Take pain medicines exactly as directed. ¨ If the doctor gave you a prescription medicine for pain, take it as prescribed. ¨ If you are not taking a prescription pain medicine, take an over-the-counter medicine such as acetaminophen (Tylenol), ibuprofen (Advil, Motrin), or naproxen (Aleve). Read and follow all instructions on the label. ¨ Do not take two or more pain medicines at the same time unless the doctor told you to. Many pain medicines contain acetaminophen, which is Tylenol. Too much Tylenol can be harmful. · If you think your pain medicine is making you sick to your stomach: 
¨ Take your medicine after meals (unless your doctor tells you not to). ¨ Ask your doctor for a different pain medicine. · If your doctor prescribed antibiotics, take them as directed. Do not stop taking them just because you feel better. You need to take the full course of antibiotics. Incision care · If you have strips of tape or glue on the incision, or cut, leave the tape/glue on for a week or until it falls off. · After 24 hours wash the area daily with warm, soapy water, and pat it dry. · You may have staples to hold the cut together. Keep them dry until your doctor takes them out. This is usually in 7 to 10 days. · Keep the area clean and dry. You may cover it with a gauze bandage if it weeps or rubs against clothing. Change the bandage every day. Ice · To reduce swelling and pain, put ice or a cold pack on your belly for 10 to 20 minutes at a time. Do this every 1 to 2 hours. Put a thin cloth between the ice and your skin. Follow-up care is a key part of your treatment and safety. Be sure to make and go to all appointments, and call your doctor if you are having problems. Its also a good idea to know your test results and keep a list of the medicines you take. When should you call for help? Call 911 anytime you think you may need emergency care. For example, call if: 
· You passed out (lost consciousness). · You have severe trouble breathing. · You have sudden chest pain and shortness of breath, or you cough up blood. Call your doctor now or seek immediate medical care if: 
· You are sick to your stomach and cannot drink fluids. · You have pain that does not get better when you take your pain medicine. · You have signs of infection, such as: 
¨ Increased pain, swelling, warmth, or redness. ¨ Red streaks leading from the incision. ¨ Pus draining from the incision. ¨ Swollen lymph nodes in your neck, armpits, or groin. ¨ A fever. · Your urine turns dark brown or your stool is light-colored or clarence-colored. · Your skin or the whites of your eyes turn yellow. · Bright red blood has soaked through a large bandage over your incision. · You have signs of a blood clot, such as: 
¨ Pain in your calf, back of knee, thigh, or groin. ¨ Redness and swelling in your leg or groin. · You have trouble passing urine or stool, especially if you have mild pain or swelling in your lower belly. · You had a laparoscopic surgery and your shoulder pain lasts more than 24 hours or if you do not have a bowel movement after taking a laxative. After general anesthesia or intravenous sedation, for 24 hours or while taking prescription Narcotics: · Limit your activities · Do not drive and operate hazardous machinery · Do not make important personal or business decisions · Do  not drink alcoholic beverages · If you have not urinated within 8 hours after discharge, please contact your surgeon on call. *  Please give a list of your current medications to your Primary Care Provider. *  Please update this list whenever your medications are discontinued, doses are 
    changed, or new medications (including over-the-counter products) are added. *  Please carry medication information at all times in case of emergency situations. These are general instructions for a healthy lifestyle: No smoking/ No tobacco products/ Avoid exposure to second hand smoke Surgeon General's Warning:  Quitting smoking now greatly reduces serious risk to your health. Obesity, smoking, and sedentary lifestyle greatly increases your risk for illness A healthy diet, regular physical exercise & weight monitoring are important for maintaining a healthy lifestyle You may be retaining fluid if you have a history of heart failure or if you experience any of the following symptoms:  Weight gain of 3 pounds or more overnight or 5 pounds in a week, increased swelling in our hands or feet or shortness of breath while lying flat in bed. Please call your doctor as soon as you notice any of these symptoms; do not wait until your next office visit. Recognize signs and symptoms of STROKE: 
F-face looks uneven A-arms unable to move or move unevenly S-speech slurred or non-existent T-time-call 911 as soon as signs and symptoms begin-DO NOT go Back to bed or wait to see if you get better-TIME IS BRAIN. Introducing [facility]! Isabella Patel introduces [employer] patient portal. Now you can access parts of your medical record, email your doctor's office, and request medication refills online. 1. In your internet browser, go to [email or web address] 2. Click on the First Time User? Click Here link in the Sign In box. You will see the New Member Sign Up page. 3. Enter your [employer] Access Code exactly as it appears below. You will not need to use this code after youve completed the sign-up process. If you do not sign up before the expiration date, you must request a new code. · [employer] Access Code: [email or web address] Expires: 2/10/2018  3:02 PM 
 
4. Enter the last four digits of your Social Security Number (xxxx) and Date of Birth (mm/dd/yyyy) as indicated and click Submit. You will be taken to the next sign-up page. 5. Create a [employer] ID. This will be your [employer] login ID and cannot be changed, so think of one that is secure and easy to remember. 6. Create a [employer] password. You can change your password at any time. 7. Enter your Password Reset Question and Answer. This can be used at a later time if you forget your password. 8. Enter your e-mail address. You will receive e-mail notification when new information is available in [address] 9. Click Sign Up. You can now view and download portions of your medical record. 10. Click the Download Summary menu link to download a portable copy of your medical information. If you have questions, please visit the Frequently Asked Questions section of the [employer] website. Remember, [employer] is NOT to be used for urgent needs. For medical emergencies, dial 911. Now available from your iPhone and Android! Providers Seen During Your Hospitalization Provider Specialty Primary office phone Chi Laguerre MD General Surgery [phone] Your Primary Care Physician (PCP) Primary Care Physician Office Phone Office Fax NONE ** None ** ** None ** You are allergic to the following Allergen Reactions Darvocet-N 100 (Propoxyphene N-Acetaminophen) Nausea and Vomiting Toradol (Ketorolac Tromethamine) Rash Pt given IV Toradol in November 2009 with NO resulting rash. Patient did not want to take Toradol on 6/25/11 admission due to the reaction she had in 2008. Recent Documentation Height Weight BMI OB Status Smoking Status 1.6 m 59.3 kg 23.17 kg/m2 Hysterectomy Current Every Day Smoker Emergency Contacts Name Discharge Info Relation Home Work Mobile Davina Rivera  Mother [14] [phone] Stevie Cuellar  Parent [1] [phone] Patient Belongings The following personal items are in your possession at time of discharge: 
  Dental Appliances: None  Visual Aid: Glasses (reading glasses only)      Home Medications: None   Jewelry: None  Clothing: Pajamas, Shirt, Hat, Undergarments, Footwear    Other Valuables: None Please provide this summary of care documentation to your next provider. Signatures-by signing, you are acknowledging that this After Visit Summary has been reviewed with you and you have received a copy. Patient Signature:  ____________________________________________________________ Date:  ____________________________________________________________  
  
Yusef Lott Provider Signature:  ____________________________________________________________ Date:  ____________________________________________________________

## 2017-11-14 NOTE — DISCHARGE INSTRUCTIONS
Cholecystectomy: What to Expect at [address] [place] [address]  After your surgery, it is normal to feel weak and tired for several days after you return home. Your belly may be swollen. Since you had laparoscopic surgery, you may also have pain in your shoulder for about 24 hours. You may have gas or need to burp a lot at first, and a few people get diarrhea. The diarrhea usually goes away in 2 to 4 weeks, but it may last longer. For a laparoscopic surgery, most people can go back to work or their normal routine in 1 to 2 weeks, but it may take longer, depending on the type of work you do. This care sheet gives you a general idea about how long it will take for you to recover. However, each person recovers at a different pace. Follow the steps below to get better as quickly as possible. How can you care for yourself at home? Activity  · Rest when you feel tired. Getting enough sleep will help you recover. · Try to walk each day. Start out by walking a little more than you did the day before. Gradually increase the amount you walk. Walking boosts blood flow and helps prevent pneumonia and constipation. · For about 2 to 4 weeks, avoid lifting anything that would make you strain or anything over 10 pounds. · Avoid strenuous activities, such as biking, jogging, weightlifting, and aerobic exercise, until your doctor says it is okay. · You may shower 24 hours after surgery, if your doctor okays it. Pat the cut (incision) dry. Do not take a bath until your doctor tells you it is okay. · You may drive when you are no longer taking pain medicine and can quickly move your foot from the gas pedal to the brake. You must also be able to sit comfortably for a long period of time, even if you do not plan to go far. You might get caught in traffic. · Your doctor will tell you when you can have sex again. Diet  · Eat smaller meals more often instead of fewer larger meals.  You can eat a normal diet, but avoid eating fatty foods for about 1 month. Fatty foods include hamburger, whole milk, cheese, and many snack foods. If your stomach is upset, try bland, low-fat foods like plain rice, broiled chicken, toast, and yogurt. · Drink plenty of fluids (unless your doctor tells you not to). · If you have diarrhea, try avoiding spicy foods, dairy products, fatty foods, and alcohol. You can also watch to see if specific foods cause it, and stop eating them. If the diarrhea continues for more than 2 weeks, talk to your doctor. · You may notice that your bowel movements are not regular right after your surgery. This is common. Try to avoid constipation and straining with bowel movements. You may want to take a fiber supplement every day. If you have not had a bowel movement after a couple of days, ask your doctor about taking a mild laxative. Medicines  · Take pain medicines exactly as directed. ¨ If the doctor gave you a prescription medicine for pain, take it as prescribed. ¨ If you are not taking a prescription pain medicine, take an over-the-counter medicine such as acetaminophen (Tylenol), ibuprofen (Advil, Motrin), or naproxen (Aleve). Read and follow all instructions on the label. ¨ Do not take two or more pain medicines at the same time unless the doctor told you to. Many pain medicines contain acetaminophen, which is Tylenol. Too much Tylenol can be harmful. · If you think your pain medicine is making you sick to your stomach:  ¨ Take your medicine after meals (unless your doctor tells you not to). ¨ Ask your doctor for a different pain medicine. · If your doctor prescribed antibiotics, take them as directed. Do not stop taking them just because you feel better. You need to take the full course of antibiotics. Incision care  · If you have strips of tape or glue on the incision, or cut, leave the tape/glue on for a week or until it falls off. · After 24 hours wash the area daily with warm, soapy water, and pat it dry.   · You may have staples to hold the cut together. Keep them dry until your doctor takes them out. This is usually in 7 to 10 days. · Keep the area clean and dry. You may cover it with a gauze bandage if it weeps or rubs against clothing. Change the bandage every day. Ice   · To reduce swelling and pain, put ice or a cold pack on your belly for 10 to 20 minutes at a time. Do this every 1 to 2 hours. Put a thin cloth between the ice and your skin. Follow-up care is a key part of your treatment and safety. Be sure to make and go to all appointments, and call your doctor if you are having problems. Its also a good idea to know your test results and keep a list of the medicines you take. When should you call for help? Call 911 anytime you think you may need emergency care. For example, call if:  · You passed out (lost consciousness). · You have severe trouble breathing. · You have sudden chest pain and shortness of breath, or you cough up blood. Call your doctor now or seek immediate medical care if:  · You are sick to your stomach and cannot drink fluids. · You have pain that does not get better when you take your pain medicine. · You have signs of infection, such as:  ¨ Increased pain, swelling, warmth, or redness. ¨ Red streaks leading from the incision. ¨ Pus draining from the incision. ¨ Swollen lymph nodes in your neck, armpits, or groin. ¨ A fever. · Your urine turns dark brown or your stool is light-colored or clarence-colored. · Your skin or the whites of your eyes turn yellow. · Bright red blood has soaked through a large bandage over your incision. · You have signs of a blood clot, such as:  ¨ Pain in your calf, back of knee, thigh, or groin. ¨ Redness and swelling in your leg or groin. · You have trouble passing urine or stool, especially if you have mild pain or swelling in your lower belly.   · You had a laparoscopic surgery and your shoulder pain lasts more than 24 hours or if you do not have a bowel movement after taking a laxative. After general anesthesia or intravenous sedation, for 24 hours or while taking prescription Narcotics:  · Limit your activities  · Do not drive and operate hazardous machinery  · Do not make important personal or business decisions  · Do  not drink alcoholic beverages  · If you have not urinated within 8 hours after discharge, please contact your surgeon on call. *  Please give a list of your current medications to your Primary Care Provider. *  Please update this list whenever your medications are discontinued, doses are      changed, or new medications (including over-the-counter products) are added. *  Please carry medication information at all times in case of emergency situations. These are general instructions for a healthy lifestyle:  No smoking/ No tobacco products/ Avoid exposure to second hand smoke  Surgeon General's Warning:  Quitting smoking now greatly reduces serious risk to your health. Obesity, smoking, and sedentary lifestyle greatly increases your risk for illness  A healthy diet, regular physical exercise & weight monitoring are important for maintaining a healthy lifestyle    You may be retaining fluid if you have a history of heart failure or if you experience any of the following symptoms:  Weight gain of 3 pounds or more overnight or 5 pounds in a week, increased swelling in our hands or feet or shortness of breath while lying flat in bed. Please call your doctor as soon as you notice any of these symptoms; do not wait until your next office visit. Recognize signs and symptoms of STROKE:  F-face looks uneven  A-arms unable to move or move unevenly  S-speech slurred or non-existent  T-time-call 911 as soon as signs and symptoms begin-DO NOT go       Back to bed or wait to see if you get better-TIME IS BRAIN.

## 2017-11-14 NOTE — ANESTHESIA PREPROCEDURE EVALUATION
Anesthetic History   No history of anesthetic complications            Review of Systems / Medical History  Patient summary reviewed, nursing notes reviewed and pertinent labs reviewed    Pulmonary    COPD: mild      Smoker         Neuro/Psych   Within defined limits           Cardiovascular                  Exercise tolerance: <4 METS     GI/Hepatic/Renal     GERD: well controlled           Endo/Other        Arthritis     Other Findings   Comments: Lost 50 pounds in 3 months.          Physical Exam    Airway  Mallampati: II  TM Distance: 4 - 6 cm  Neck ROM: normal range of motion   Mouth opening: Normal     Cardiovascular  Regular rate and rhythm,  S1 and S2 normal,  no murmur, click, rub, or gallop  Rhythm: regular  Rate: normal         Dental    Dentition: Edentulous     Pulmonary            Prolonged expiration     Abdominal         Other Findings            Anesthetic Plan    ASA: 3  Anesthesia type: general          Induction: Intravenous  Anesthetic plan and risks discussed with: Patient and Family

## 2018-02-06 ENCOUNTER — APPOINTMENT (OUTPATIENT)
Dept: GENERAL RADIOLOGY | Age: 53
End: 2018-02-06
Attending: EMERGENCY MEDICINE
Payer: SUBSIDIZED

## 2018-02-06 ENCOUNTER — HOSPITAL ENCOUNTER (EMERGENCY)
Age: 53
Discharge: HOME | End: 2018-02-07
Attending: EMERGENCY MEDICINE
Payer: SUBSIDIZED

## 2018-02-06 DIAGNOSIS — R10.13 EPIGASTRIC DISCOMFORT: Primary | ICD-10-CM

## 2018-02-06 DIAGNOSIS — M94.0 COSTOCHONDRITIS: ICD-10-CM

## 2018-02-06 LAB
ALBUMIN SERPL-MCNC: 3.6 G/DL (ref 3.5–5)
ALBUMIN/GLOB SERPL: 1 {RATIO} (ref 1.2–3.5)
ALP SERPL-CCNC: 134 U/L (ref 50–136)
ALT SERPL-CCNC: 14 U/L (ref 12–65)
ANION GAP SERPL CALC-SCNC: 9 MMOL/L (ref 7–16)
AST SERPL-CCNC: 11 U/L (ref 15–37)
ATRIAL RATE: 73 BPM
BASOPHILS # BLD: 0 K/UL (ref 0–0.2)
BASOPHILS NFR BLD: 1 % (ref 0–2)
BILIRUB SERPL-MCNC: 0.2 MG/DL (ref 0.2–1.1)
BUN SERPL-MCNC: 21 MG/DL (ref 6–23)
CALCIUM SERPL-MCNC: 8.5 MG/DL (ref 8.3–10.4)
CALCULATED P AXIS, ECG09: 69 DEGREES
CALCULATED R AXIS, ECG10: 31 DEGREES
CALCULATED T AXIS, ECG11: 71 DEGREES
CHLORIDE SERPL-SCNC: 111 MMOL/L (ref 98–107)
CO2 SERPL-SCNC: 21 MMOL/L (ref 21–32)
CREAT SERPL-MCNC: 0.91 MG/DL (ref 0.6–1)
DIAGNOSIS, 93000: NORMAL
DIFFERENTIAL METHOD BLD: ABNORMAL
EOSINOPHIL # BLD: 0.2 K/UL (ref 0–0.8)
EOSINOPHIL NFR BLD: 2 % (ref 0.5–7.8)
ERYTHROCYTE [DISTWIDTH] IN BLOOD BY AUTOMATED COUNT: 13.7 % (ref 11.9–14.6)
GLOBULIN SER CALC-MCNC: 3.5 G/DL (ref 2.3–3.5)
GLUCOSE SERPL-MCNC: 89 MG/DL (ref 65–100)
HCT VFR BLD AUTO: 40 % (ref 35.8–46.3)
HGB BLD-MCNC: 13 G/DL (ref 11.7–15.4)
IMM GRANULOCYTES # BLD: 0 K/UL (ref 0–0.5)
IMM GRANULOCYTES NFR BLD AUTO: 0 % (ref 0–5)
LIPASE SERPL-CCNC: 235 U/L (ref 73–393)
LYMPHOCYTES # BLD: 3.2 K/UL (ref 0.5–4.6)
LYMPHOCYTES NFR BLD: 41 % (ref 13–44)
MCH RBC QN AUTO: 33 PG (ref 26.1–32.9)
MCHC RBC AUTO-ENTMCNC: 32.5 G/DL (ref 31.4–35)
MCV RBC AUTO: 101.5 FL (ref 79.6–97.8)
MONOCYTES # BLD: 0.4 K/UL (ref 0.1–1.3)
MONOCYTES NFR BLD: 5 % (ref 4–12)
NEUTS SEG # BLD: 4 K/UL (ref 1.7–8.2)
NEUTS SEG NFR BLD: 51 % (ref 43–78)
P-R INTERVAL, ECG05: 170 MS
PLATELET # BLD AUTO: 252 K/UL (ref 150–450)
PMV BLD AUTO: 9.4 FL (ref 10.8–14.1)
POTASSIUM SERPL-SCNC: 4.1 MMOL/L (ref 3.5–5.1)
PROT SERPL-MCNC: 7.1 G/DL (ref 6.3–8.2)
Q-T INTERVAL, ECG07: 368 MS
QRS DURATION, ECG06: 86 MS
QTC CALCULATION (BEZET), ECG08: 405 MS
RBC # BLD AUTO: 3.94 M/UL (ref 4.05–5.25)
SODIUM SERPL-SCNC: 141 MMOL/L (ref 136–145)
TROPONIN I SERPL-MCNC: <0.02 NG/ML (ref 0.02–0.05)
VENTRICULAR RATE, ECG03: 73 BPM
WBC # BLD AUTO: 7.8 K/UL (ref 4.3–11.1)

## 2018-02-06 PROCEDURE — 83690 ASSAY OF LIPASE: CPT | Performed by: EMERGENCY MEDICINE

## 2018-02-06 PROCEDURE — 80053 COMPREHEN METABOLIC PANEL: CPT | Performed by: EMERGENCY MEDICINE

## 2018-02-06 PROCEDURE — 99284 EMERGENCY DEPT VISIT MOD MDM: CPT | Performed by: EMERGENCY MEDICINE

## 2018-02-06 PROCEDURE — 71046 X-RAY EXAM CHEST 2 VIEWS: CPT

## 2018-02-06 PROCEDURE — 74011250637 HC RX REV CODE- 250/637: Performed by: EMERGENCY MEDICINE

## 2018-02-06 PROCEDURE — 84484 ASSAY OF TROPONIN QUANT: CPT | Performed by: EMERGENCY MEDICINE

## 2018-02-06 PROCEDURE — 93005 ELECTROCARDIOGRAM TRACING: CPT | Performed by: EMERGENCY MEDICINE

## 2018-02-06 PROCEDURE — 85025 COMPLETE CBC W/AUTO DIFF WBC: CPT | Performed by: EMERGENCY MEDICINE

## 2018-02-06 RX ORDER — SUCRALFATE 1 G/10ML
1 SUSPENSION ORAL
Status: COMPLETED | OUTPATIENT
Start: 2018-02-06 | End: 2018-02-06

## 2018-02-06 RX ADMIN — Medication 30 ML: at 21:03

## 2018-02-06 RX ADMIN — SUCRALFATE 1 G: 1 SUSPENSION ORAL at 21:03

## 2018-02-06 NOTE — ED TRIAGE NOTES
C/o left upper abdominal pain radiating around to left ribs, dizziness and nausea. Onset earlier today while at work. States \"felt like room was spinning\". Denies syncope. States cold last week however denies cough. Denies recent falls or injury. Denies n/v/d. States right sided pneumonia couple months pta, states feels similar pain.

## 2018-02-07 VITALS
DIASTOLIC BLOOD PRESSURE: 63 MMHG | SYSTOLIC BLOOD PRESSURE: 134 MMHG | OXYGEN SATURATION: 96 % | BODY MASS INDEX: 22.86 KG/M2 | TEMPERATURE: 98 F | WEIGHT: 129 LBS | RESPIRATION RATE: 18 BRPM | HEART RATE: 82 BPM | HEIGHT: 63 IN

## 2018-02-07 LAB — TROPONIN I SERPL-MCNC: <0.02 NG/ML (ref 0.02–0.05)

## 2018-02-07 NOTE — ED NOTES
I have reviewed discharge instructions with the patient. The patient verbalized understanding. Patient left ED via Discharge Method: ambulatory to Home with self. Opportunity for questions and clarification provided. Patient given 0 scripts. To continue your aftercare when you leave the hospital, you may receive an automated call from our care team to check in on how you are doing. This is a free service and part of our promise to provide the best care and service to meet your aftercare needs.  If you have questions, or wish to unsubscribe from this service please call [phone]. Thank you for Choosing our [facility] Emergency Department.

## 2018-02-07 NOTE — ED PROVIDER NOTES
HPI Comments: Here with epigastric region discomfort. She had some dull discomfort there last night that was became somewhat worse when she was at work. She works as a .  No history of similar in the past.  She has had some nausea but no vomiting. States the pain radiates around into her left back. She has had upper respiratory infection that seemed to be improving. No infectious due to. Denies any nausea vomiting or diarrhea. States she had pneumonia 3 or 4 months ago that felt similar but was on the opposite side. Denies any chest discomfort. Denies any arm or jaw discomfort. Does not drink alcohol. Had her gallbladder taken out 3 months ago. Overall was done well with this. No history of pancreatitis. No bloody or melanotic stool. Had prescription for Norco 10mg #90 filled earlier this month    Patient is a 47 y.o. female presenting with epigastric pain. The history is provided by the patient. Epigastric Pain    This is a new problem. Past Medical History:   Diagnosis Date    Arthritis     Gastrointestinal disorder     GERD, hiatal hernia    GERD (gastroesophageal reflux disease)     Kidney stone     Migraines     Pyelonephritis, acute 6/30/2011    Pyelonephritis, acute        Past Surgical History:   Procedure Laterality Date    HX GYN      hysterectomy    HX OTHER SURGICAL      kidney stone surgery         Family History:   Problem Relation Age of Onset    Diabetes Mother     No Known Problems Father        Social History     Social History    Marital status:      Spouse name: N/A    Number of children: N/A    Years of education: N/A     Occupational History    Not on file.      Social History Main Topics    Smoking status: Current Every Day Smoker     Packs/day: 1.00     Years: 30.00    Smokeless tobacco: Never Used    Alcohol use No    Drug use: No    Sexual activity: Not Currently     Other Topics Concern    Not on file     Social History Narrative ALLERGIES: Darvocet-n 100 [propoxyphene n-acetaminophen] and Toradol [ketorolac tromethamine]    Review of Systems   Constitutional: Negative for chills. HENT: Negative. Gastrointestinal: Negative. Genitourinary: Negative. Musculoskeletal: Negative. All other systems reviewed and are negative. Vitals:    02/06/18 1740   BP: 137/65   Pulse: 80   Resp: 20   Temp: 98 °F (36.7 °C)   SpO2: 96%   Weight: 58.5 kg (129 lb)   Height: 5' 3\" (1.6 m)            Physical Exam   Constitutional: She appears well-developed and well-nourished. No distress. HENT:   Head: Atraumatic. Mouth/Throat: Oropharynx is clear and moist.   Eyes: No scleral icterus. Neck: Neck supple. Cardiovascular: Normal rate and intact distal pulses. Pulmonary/Chest: Effort normal. No respiratory distress. She has no wheezes. Abdominal: Soft. She exhibits no distension. There is tenderness. There is no rebound and no guarding. Mildly sore in the epigastric region but otherwise quite unremarkable. Normoactive bowel sounds. No abdominal wall defect. No CVA tenderness. Musculoskeletal: Normal range of motion. Neurological: She is alert. Skin: Skin is warm and dry. She is not diaphoretic. Psychiatric: Thought content normal.   Nursing note and vitals reviewed.        MDM  Number of Diagnoses or Management Options  Costochondritis:   Epigastric discomfort:   Diagnosis management comments: Sore to epigastric region, labs directed toward cardiac and GI done with no identified pathology found       Amount and/or Complexity of Data Reviewed  Clinical lab tests: ordered and reviewed  Tests in the radiology section of CPT®: reviewed  Independent visualization of images, tracings, or specimens: yes    Risk of Complications, Morbidity, and/or Mortality  Presenting problems: high  Diagnostic procedures: low  Management options: moderate          ED Course       Procedures

## 2018-07-31 ENCOUNTER — HOSPITAL ENCOUNTER (EMERGENCY)
Age: 53
Discharge: HOME | End: 2018-07-31
Attending: EMERGENCY MEDICINE
Payer: SELF-PAY

## 2018-07-31 ENCOUNTER — APPOINTMENT (OUTPATIENT)
Dept: CT IMAGING | Age: 53
End: 2018-07-31
Attending: EMERGENCY MEDICINE
Payer: SELF-PAY

## 2018-07-31 ENCOUNTER — APPOINTMENT (OUTPATIENT)
Dept: GENERAL RADIOLOGY | Age: 53
End: 2018-07-31
Attending: EMERGENCY MEDICINE
Payer: SELF-PAY

## 2018-07-31 VITALS
RESPIRATION RATE: 16 BRPM | TEMPERATURE: 97.7 F | HEIGHT: 63 IN | DIASTOLIC BLOOD PRESSURE: 60 MMHG | WEIGHT: 130 LBS | SYSTOLIC BLOOD PRESSURE: 129 MMHG | BODY MASS INDEX: 23.04 KG/M2 | HEART RATE: 81 BPM | OXYGEN SATURATION: 91 %

## 2018-07-31 DIAGNOSIS — S29.011A INTERCOSTAL MUSCLE STRAIN, INITIAL ENCOUNTER: Primary | ICD-10-CM

## 2018-07-31 DIAGNOSIS — R10.9 RIGHT FLANK PAIN: ICD-10-CM

## 2018-07-31 LAB
ALBUMIN SERPL-MCNC: 3.6 G/DL (ref 3.5–5)
ALBUMIN/GLOB SERPL: 0.9 {RATIO} (ref 1.2–3.5)
ALP SERPL-CCNC: 175 U/L (ref 50–136)
ALT SERPL-CCNC: 185 U/L (ref 12–65)
ANION GAP SERPL CALC-SCNC: 10 MMOL/L (ref 7–16)
AST SERPL-CCNC: 151 U/L (ref 15–37)
ATRIAL RATE: 81 BPM
BACTERIA URNS QL MICRO: 0 /HPF
BASOPHILS # BLD: 0 K/UL (ref 0–0.2)
BASOPHILS NFR BLD: 0 % (ref 0–2)
BILIRUB SERPL-MCNC: 0.3 MG/DL (ref 0.2–1.1)
BUN SERPL-MCNC: 20 MG/DL (ref 6–23)
CALCIUM SERPL-MCNC: 9.3 MG/DL (ref 8.3–10.4)
CALCULATED P AXIS, ECG09: 86 DEGREES
CALCULATED R AXIS, ECG10: 21 DEGREES
CALCULATED T AXIS, ECG11: 75 DEGREES
CASTS URNS QL MICRO: NORMAL /LPF
CHLORIDE SERPL-SCNC: 107 MMOL/L (ref 98–107)
CO2 SERPL-SCNC: 21 MMOL/L (ref 21–32)
CREAT SERPL-MCNC: 0.73 MG/DL (ref 0.6–1)
D DIMER PPP FEU-MCNC: 0.45 UG/ML(FEU)
DIAGNOSIS, 93000: NORMAL
DIFFERENTIAL METHOD BLD: ABNORMAL
EOSINOPHIL # BLD: 0.1 K/UL (ref 0–0.8)
EOSINOPHIL NFR BLD: 1 % (ref 0.5–7.8)
EPI CELLS #/AREA URNS HPF: NORMAL /HPF
ERYTHROCYTE [DISTWIDTH] IN BLOOD BY AUTOMATED COUNT: 14.8 % (ref 11.9–14.6)
GLOBULIN SER CALC-MCNC: 4.2 G/DL (ref 2.3–3.5)
GLUCOSE SERPL-MCNC: 82 MG/DL (ref 65–100)
HCT VFR BLD AUTO: 41.5 % (ref 35.8–46.3)
HGB BLD-MCNC: 13.4 G/DL (ref 11.7–15.4)
IMM GRANULOCYTES # BLD: 0 K/UL (ref 0–0.5)
IMM GRANULOCYTES NFR BLD AUTO: 0 % (ref 0–5)
LIPASE SERPL-CCNC: 298 U/L (ref 73–393)
LYMPHOCYTES # BLD: 1.8 K/UL (ref 0.5–4.6)
LYMPHOCYTES NFR BLD: 27 % (ref 13–44)
MCH RBC QN AUTO: 32 PG (ref 26.1–32.9)
MCHC RBC AUTO-ENTMCNC: 32.3 G/DL (ref 31.4–35)
MCV RBC AUTO: 99 FL (ref 79.6–97.8)
MONOCYTES # BLD: 0.2 K/UL (ref 0.1–1.3)
MONOCYTES NFR BLD: 4 % (ref 4–12)
NEUTS SEG # BLD: 4.3 K/UL (ref 1.7–8.2)
NEUTS SEG NFR BLD: 68 % (ref 43–78)
P-R INTERVAL, ECG05: 170 MS
PLATELET # BLD AUTO: 257 K/UL (ref 150–450)
PMV BLD AUTO: 9.1 FL (ref 10.8–14.1)
POTASSIUM SERPL-SCNC: 3.7 MMOL/L (ref 3.5–5.1)
PROT SERPL-MCNC: 7.8 G/DL (ref 6.3–8.2)
Q-T INTERVAL, ECG07: 372 MS
QRS DURATION, ECG06: 86 MS
QTC CALCULATION (BEZET), ECG08: 432 MS
RBC # BLD AUTO: 4.19 M/UL (ref 4.05–5.25)
RBC #/AREA URNS HPF: NORMAL /HPF
SODIUM SERPL-SCNC: 138 MMOL/L (ref 136–145)
TROPONIN I SERPL-MCNC: <0.02 NG/ML (ref 0.02–0.05)
VENTRICULAR RATE, ECG03: 81 BPM
WBC # BLD AUTO: 6.4 K/UL (ref 4.3–11.1)
WBC URNS QL MICRO: NORMAL /HPF

## 2018-07-31 PROCEDURE — 96374 THER/PROPH/DIAG INJ IV PUSH: CPT | Performed by: EMERGENCY MEDICINE

## 2018-07-31 PROCEDURE — 74176 CT ABD & PELVIS W/O CONTRAST: CPT

## 2018-07-31 PROCEDURE — 93005 ELECTROCARDIOGRAM TRACING: CPT | Performed by: EMERGENCY MEDICINE

## 2018-07-31 PROCEDURE — 71046 X-RAY EXAM CHEST 2 VIEWS: CPT

## 2018-07-31 PROCEDURE — 80053 COMPREHEN METABOLIC PANEL: CPT | Performed by: EMERGENCY MEDICINE

## 2018-07-31 PROCEDURE — 74011250636 HC RX REV CODE- 250/636: Performed by: EMERGENCY MEDICINE

## 2018-07-31 PROCEDURE — 83690 ASSAY OF LIPASE: CPT | Performed by: EMERGENCY MEDICINE

## 2018-07-31 PROCEDURE — 81003 URINALYSIS AUTO W/O SCOPE: CPT | Performed by: EMERGENCY MEDICINE

## 2018-07-31 PROCEDURE — 81015 MICROSCOPIC EXAM OF URINE: CPT | Performed by: EMERGENCY MEDICINE

## 2018-07-31 PROCEDURE — 85025 COMPLETE CBC W/AUTO DIFF WBC: CPT | Performed by: EMERGENCY MEDICINE

## 2018-07-31 PROCEDURE — 99284 EMERGENCY DEPT VISIT MOD MDM: CPT | Performed by: EMERGENCY MEDICINE

## 2018-07-31 PROCEDURE — 84484 ASSAY OF TROPONIN QUANT: CPT | Performed by: EMERGENCY MEDICINE

## 2018-07-31 PROCEDURE — 96375 TX/PRO/DX INJ NEW DRUG ADDON: CPT | Performed by: EMERGENCY MEDICINE

## 2018-07-31 PROCEDURE — 85379 FIBRIN DEGRADATION QUANT: CPT | Performed by: EMERGENCY MEDICINE

## 2018-07-31 RX ORDER — ONDANSETRON 4 MG/1
4 TABLET, ORALLY DISINTEGRATING ORAL
Qty: 6 TAB | Refills: 0 | Status: SHIPPED | OUTPATIENT
Start: 2018-07-31

## 2018-07-31 RX ORDER — MORPHINE SULFATE 15 MG/1
15 TABLET ORAL
Qty: 10 TAB | Refills: 0 | Status: ON HOLD | OUTPATIENT
Start: 2018-07-31 | End: 2018-12-18

## 2018-07-31 RX ORDER — MORPHINE SULFATE 10 MG/ML
5 INJECTION, SOLUTION INTRAMUSCULAR; INTRAVENOUS
Status: COMPLETED | OUTPATIENT
Start: 2018-07-31 | End: 2018-07-31

## 2018-07-31 RX ORDER — ONDANSETRON 2 MG/ML
4 INJECTION INTRAMUSCULAR; INTRAVENOUS
Status: COMPLETED | OUTPATIENT
Start: 2018-07-31 | End: 2018-07-31

## 2018-07-31 RX ORDER — KETOROLAC TROMETHAMINE 30 MG/ML
15 INJECTION, SOLUTION INTRAMUSCULAR; INTRAVENOUS
Status: COMPLETED | OUTPATIENT
Start: 2018-07-31 | End: 2018-07-31

## 2018-07-31 RX ADMIN — ONDANSETRON 4 MG: 2 INJECTION, SOLUTION INTRAMUSCULAR; INTRAVENOUS at 15:13

## 2018-07-31 RX ADMIN — MORPHINE SULFATE 5 MG: 10 INJECTION INTRAMUSCULAR; INTRAVENOUS; SUBCUTANEOUS at 15:12

## 2018-07-31 RX ADMIN — KETOROLAC TROMETHAMINE 15 MG: 30 INJECTION, SOLUTION INTRAMUSCULAR at 16:45

## 2018-07-31 NOTE — ED PROVIDER NOTES
Patient is a 47 y.o. female presenting with flank pain. The history is provided by the patient. Flank Pain This is a new problem. Episode onset: 9 AM. The problem has not changed since onset. The problem occurs constantly. The pain is associated with no known injury. The pain is present in the right side. The quality of the pain is described as sharp and stabbing. The pain does not radiate. The pain is severe. Exacerbated by: othing. The pain is the same all the time. Pertinent negatives include no chest pain, no fever, no numbness, no headaches, no abdominal pain, no perianal numbness, no paresthesias, no paresis, no tingling and no weakness. She has tried nothing for the symptoms. Past Medical History:  
Diagnosis Date  Arthritis  Gastrointestinal disorder GERD, hiatal hernia  GERD (gastroesophageal reflux disease)  Kidney stone  Migraines  Pyelonephritis, acute 6/30/2011  Pyelonephritis, acute Past Surgical History:  
Procedure Laterality Date  HX GYN    
 hysterectomy  HX OTHER SURGICAL    
 kidney stone surgery Family History:  
Problem Relation Age of Onset  Diabetes Mother  No Known Problems Father Social History Social History  Marital status:  Spouse name: N/A  
 Number of children: N/A  
 Years of education: N/A Occupational History  Not on file. Social History Main Topics  Smoking status: Current Every Day Smoker Packs/day: 1.00 Years: 30.00  Smokeless tobacco: Never Used  Alcohol use No  
 Drug use: No  
 Sexual activity: Not Currently Other Topics Concern  Not on file Social History Narrative ALLERGIES: Darvocet-n 100 [propoxyphene n-acetaminophen] and Toradol [ketorolac tromethamine] Review of Systems Constitutional: Negative for chills and fever. HENT: Negative for congestion, rhinorrhea and sore throat. Eyes: Negative for photophobia and redness. Respiratory: Positive for shortness of breath. Negative for cough. Cardiovascular: Negative for chest pain and leg swelling. Gastrointestinal: Positive for nausea and vomiting (2). Negative for abdominal pain and diarrhea. Endocrine: Negative for polydipsia and polyuria. Genitourinary: Positive for flank pain. Musculoskeletal: Negative for back pain and myalgias. Neurological: Negative for tingling, weakness, numbness, headaches and paresthesias. Vitals:  
 07/31/18 1256 07/31/18 1445 07/31/18 1544 07/31/18 1547 BP: 131/83 152/82 129/65 Pulse: 81 Resp: 20   16 Temp: 97.7 °F (36.5 °C) SpO2: 96% 97% 91% Weight: 59 kg (130 lb) Height: 5' 3\" (1.6 m) Physical Exam  
Constitutional: She is oriented to person, place, and time. She appears well-developed and well-nourished. Appears uncomfortable HENT:  
Mouth/Throat: Oropharynx is clear and moist. No oropharyngeal exudate. Eyes: Conjunctivae are normal. Pupils are equal, round, and reactive to light. Neck: Normal range of motion. Neck supple. Cardiovascular: Normal rate, regular rhythm, normal heart sounds and intact distal pulses. No murmur heard. Pulmonary/Chest: Effort normal and breath sounds normal. No respiratory distress. She exhibits tenderness (Right anterolateral chest wall tenderness). Abdominal: Soft. She exhibits no distension. There is no tenderness. There is no rebound and no guarding. Musculoskeletal: Normal range of motion. She exhibits no edema or tenderness. Neurological: She is alert and oriented to person, place, and time. She has normal strength. No sensory deficit. Skin: Skin is warm and dry. Nursing note and vitals reviewed. MDM Number of Diagnoses or Management Options Diagnosis management comments: Chest wall pain versus pyelonephritis or renal colic versus PE. Less likely biliary colic given history of cholecystectomy. Retained stone? . 
 
4:47 PM 
No elevated bilirubin to suggest retained stone. D-dimer negative so doubt PE. Troponin normal.  Pain not consistent with cardiac pain. Small amount of blood in the urine with sudden onset of pain and nausea and vomiting so renal colic likely. CT urogram ordered. Minimal improvement with opiates so Toradol was ordered. It is documented that the last time patient received Toradol she did not have a rash. She denies rash to me and states her blood pressure dropped. Asked if she had a rash or itching or any swelling of her lips, face, tongue or throat and she denies this. Toradol was ordered for her likely renal colic pain. Amount and/or Complexity of Data Reviewed Clinical lab tests: ordered and reviewed (Results for orders placed or performed during the hospital encounter of 07/31/18 
-CBC WITH AUTOMATED DIFF Result                                            Value                         Ref Range WBC                                               6.4                           4.3 - 11.1 K/uL           
     RBC                                               4.19                          4.05 - 5.25 M/uL HGB                                               13.4                          11.7 - 15.4 g/dL HCT                                               41.5                          35.8 - 46.3 % MCV                                               99.0 (H)                      79.6 - 97.8 FL            
     MCH                                               32.0                          26.1 - 32.9 PG            
     MCHC                                              32.3                          31.4 - 35.0 g/dL RDW                                               14.8 (H)                      11.9 - 14.6 %      PLATELET                                          257                           150 - 450 K/uL MPV                                               9.1 (L)                       10.8 - 14.1 FL            
     DF                                                AUTOMATED NEUTROPHILS                                       68                            43 - 78 % LYMPHOCYTES                                       27                            13 - 44 % MONOCYTES                                         4                             4.0 - 12.0 % EOSINOPHILS                                       1                             0.5 - 7.8 % BASOPHILS                                         0                             0.0 - 2.0 % IMMATURE GRANULOCYTES                             0                             0.0 - 5.0 %               
     ABS. NEUTROPHILS                                  4.3                           1.7 - 8.2 K/UL            
     ABS. LYMPHOCYTES                                  1.8                           0.5 - 4.6 K/UL            
     ABS. MONOCYTES                                    0.2                           0.1 - 1.3 K/UL            
     ABS. EOSINOPHILS                                  0.1                           0.0 - 0.8 K/UL            
     ABS. BASOPHILS                                    0.0                           0.0 - 0.2 K/UL            
     ABS. IMM. GRANS.                                  0.0                           0.0 - 0.5 K/UL            
-METABOLIC PANEL, COMPREHENSIVE Result                                            Value                         Ref Range Sodium                                            138                           136 - 145 mmol/L      Potassium                                         3.7                           3.5 - 5.1 mmol/L          
 Chloride                                          107                           98 - 107 mmol/L           
     CO2                                               21                            21 - 32 mmol/L Anion gap                                         10                            7 - 16 mmol/L Glucose                                           82                            65 - 100 mg/dL BUN                                               20                            6 - 23 MG/DL Creatinine                                        0.73                          0.6 - 1.0 MG/DL           
     GFR est AA                                        >60                           >60 ml/min/1.73m2 GFR est non-AA                                    >60                           >60 ml/min/1.73m2 Calcium                                           9.3                           8.3 - 10.4 MG/DL Bilirubin, total                                  0.3                           0.2 - 1.1 MG/DL           
     ALT (SGPT)                                        185 (H)                       12 - 65 U/L               
     AST (SGOT)                                        151 (H)                       15 - 37 U/L Alk. phosphatase                                  175 (H)                       50 - 136 U/L Protein, total                                    7.8                           6.3 - 8.2 g/dL Albumin                                           3.6                           3.5 - 5.0 g/dL Globulin                                          4.2 (H)                       2.3 - 3.5 g/dL A-G Ratio                                         0.9 (L)                       1.2 - 3.5                 
-LIPASE      Result Value                         Ref Range Lipase                                            298                           73 - 393 U/L              
-TROPONIN I Result                                            Value                         Ref Range Troponin-I, Qt.                                   <0.02 (L)                     0.02 - 0.05 NG/ML         
-D DIMER Result                                            Value                         Ref Range D DIMER                                           0.45                          <0.56 ug/ml(FEU) -URINE MICROSCOPIC Result                                            Value                         Ref Range WBC                                               0-3                           0 /hpf                    
     RBC                                               10-20                         0 /hpf Epithelial cells                                  0-3                           0 /hpf Bacteria                                          0                             0 /hpf Casts                                             5-10                          0 /lpf                    
-EKG, 12 LEAD, INITIAL Result                                            Value                         Ref Range Ventricular Rate                                  81                            BPM                       
     Atrial Rate                                       81                            BPM                       
     P-R Interval                                      170                           ms                        
     QRS Duration                                      86                            ms      Q-T Interval 372                           ms                        
     QTC Calculation (Bezet)                           432                           ms                        
     Calculated P Axis                                 86                            degrees Calculated R Axis                                 21                            degrees Calculated T Axis                                 75                            degrees Diagnosis Normal sinus rhythm Low voltage QRS Cannot rule out Anterior infarct , age undetermined Abnormal ECG When compared with ECG of 06-FEB-2018 17:52, No significant change was found Confirmed by ELIAZAR RENDON (), Yazmin Gonzalez ([id number]) on 7/31/2018 3:56:51 PM 
  
) Tests in the radiology section of CPT®: ordered and reviewed (Xr Chest Pa Lat Result Date: 7/31/2018 CHEST X-RAY PA AND LATERAL : 7/31/2018 Indication: Right upper quadrant pain for several hours Comparison: February 6, 2018 Findings: The lung fields are clear. The heart, mediastinum, soft tissues, and bony structures are remarkable for thoracic scoliosis. IMPRESSION: Thoracic scoliosis, clear lung fields. Xr Chest Pa Lat Result Date: 7/31/2018 CHEST X-RAY PA AND LATERAL : 7/31/2018 Indication: Right upper quadrant pain for several hours Comparison: February 6, 2018 Findings: The lung fields are clear. The heart, mediastinum, soft tissues, and bony structures are remarkable for thoracic scoliosis. IMPRESSION: Thoracic scoliosis, clear lung fields. Ct Urogram Wo Cont Result Date: 7/31/2018 CT abdomen and pelvis without contrast (renal stone protocol): 07/31/2018 History: Right flank pain beginning this morning, history of kidney stones.  Technique: Helically acquired images were obtained from the upper abdomen to the ischial tuberosities reconstructed at 3.8mm intervals without oral or IV contrast. Coronal reformatted images were submitted. Radiation dose reduction techniques were used for this study:  Our CT scanners use one or all of the following: Automated exposure control, adjustment of the mA and/or kVp according to patient's size, iterative reconstruction. Comparison: Contrast-enhanced study 22,017 CT ABDOMEN: The liver, spleen, pancreas and right adrenal gland are unremarkable. There is mild nodularity to the left adrenal gland, unchanged. There is multifocal areas of cortical scarring and atrophy involving the right kidney. At the lower pole is a cyst, unchanged. There are a few punctate nonobstructing lower pole left renal calculi, likely unchanged. There may be a single punctate calculus at the lower pole right kidney. Atherosclerotic changes are present involving the abdominal aorta. No adenopathy or ascites is present. The appendix is unremarkable. The patient status post cholecystectomy. CT PELVIS: No calculi are identified along the course of this ureters. No adenopathy or ascites is present. There are no plantar changes. There are moderate degenerative changes at the L5-S1 facet joints. The patient status post hysterectomy. IMPRESSION: 1. Punctate nonobstructing bilateral renal calculi. 2. Cortical scarring and atrophy of the right kidney. ) ED Course Procedures

## 2018-07-31 NOTE — ED NOTES
Pt states she drove herself here but has someone to pick her up if she is d/c. Pt states she could call her sister in law Jenae Veloz.

## 2018-07-31 NOTE — ED TRIAGE NOTES
Pt became SOB while sitting at work, with stabbing pains to the right chest that radiates to the back (thoracic region). Pt states it came on suddenly. Last bowel movement yesterday. Confirms painful urination. Hx of galbladder surgery. States \"my stomach is so hard\". Hx of dvt in right leg

## 2018-12-18 ENCOUNTER — APPOINTMENT (OUTPATIENT)
Dept: GENERAL RADIOLOGY | Age: 53
DRG: 378 | End: 2018-12-18
Attending: EMERGENCY MEDICINE
Payer: SUBSIDIZED

## 2018-12-18 ENCOUNTER — ANESTHESIA EVENT (OUTPATIENT)
Dept: ENDOSCOPY | Age: 53
DRG: 378 | End: 2018-12-18
Payer: SUBSIDIZED

## 2018-12-18 ENCOUNTER — HOSPITAL ENCOUNTER (INPATIENT)
Age: 53
LOS: 1 days | Discharge: HOME | DRG: 378 | End: 2018-12-21
Attending: EMERGENCY MEDICINE | Admitting: INTERNAL MEDICINE
Payer: SUBSIDIZED

## 2018-12-18 DIAGNOSIS — R10.11 RIGHT UPPER QUADRANT ABDOMINAL PAIN: ICD-10-CM

## 2018-12-18 DIAGNOSIS — K92.2 UGIB (UPPER GASTROINTESTINAL BLEED): Primary | ICD-10-CM

## 2018-12-18 PROBLEM — D72.829 LEUKOCYTOSIS: Status: ACTIVE | Noted: 2018-12-18

## 2018-12-18 PROBLEM — K92.1 MELENA: Status: ACTIVE | Noted: 2018-12-18

## 2018-12-18 PROBLEM — R65.10 SIRS OF NON-INFECTIOUS ORIGIN W/O ACUTE ORGAN DYSFUNCTION (HCC): Status: ACTIVE | Noted: 2018-12-18

## 2018-12-18 PROBLEM — R11.2 NAUSEA AND VOMITING: Status: ACTIVE | Noted: 2018-12-18

## 2018-12-18 PROBLEM — R10.13 ACUTE EPIGASTRIC PAIN: Status: ACTIVE | Noted: 2018-12-18

## 2018-12-18 PROBLEM — K21.9 GERD (GASTROESOPHAGEAL REFLUX DISEASE): Status: ACTIVE | Noted: 2018-12-18

## 2018-12-18 PROBLEM — N19 ACUTE PRERENAL AZOTEMIA: Status: ACTIVE | Noted: 2018-12-18

## 2018-12-18 PROBLEM — R00.0 SINUS TACHYCARDIA: Status: ACTIVE | Noted: 2018-12-18

## 2018-12-18 LAB
ABO + RH BLD: NORMAL
ALBUMIN SERPL-MCNC: 4 G/DL (ref 3.5–5)
ALBUMIN/GLOB SERPL: 0.9 {RATIO} (ref 1.2–3.5)
ALP SERPL-CCNC: 149 U/L (ref 50–136)
ALT SERPL-CCNC: 21 U/L (ref 12–65)
ANION GAP SERPL CALC-SCNC: 13 MMOL/L (ref 7–16)
AST SERPL-CCNC: 8 U/L (ref 15–37)
ATRIAL RATE: 102 BPM
BASOPHILS # BLD: 0.1 K/UL (ref 0–0.2)
BASOPHILS NFR BLD: 1 % (ref 0–2)
BILIRUB SERPL-MCNC: 0.4 MG/DL (ref 0.2–1.1)
BLOOD GROUP ANTIBODIES SERPL: NORMAL
BUN SERPL-MCNC: 45 MG/DL (ref 6–23)
CALCIUM SERPL-MCNC: 9.7 MG/DL (ref 8.3–10.4)
CALCULATED P AXIS, ECG09: 67 DEGREES
CALCULATED R AXIS, ECG10: 82 DEGREES
CALCULATED T AXIS, ECG11: 83 DEGREES
CHLORIDE SERPL-SCNC: 99 MMOL/L (ref 98–107)
CO2 SERPL-SCNC: 24 MMOL/L (ref 21–32)
CREAT SERPL-MCNC: 1.09 MG/DL (ref 0.6–1)
DIAGNOSIS, 93000: NORMAL
DIFFERENTIAL METHOD BLD: ABNORMAL
EOSINOPHIL # BLD: 0 K/UL (ref 0–0.8)
EOSINOPHIL NFR BLD: 0 % (ref 0.5–7.8)
ERYTHROCYTE [DISTWIDTH] IN BLOOD BY AUTOMATED COUNT: 13 % (ref 11.9–14.6)
GLOBULIN SER CALC-MCNC: 4.3 G/DL (ref 2.3–3.5)
GLUCOSE SERPL-MCNC: 111 MG/DL (ref 65–100)
HCT VFR BLD AUTO: 36.3 % (ref 35.8–46.3)
HCT VFR BLD AUTO: 46 % (ref 35.8–46.3)
HGB BLD-MCNC: 11.5 G/DL (ref 11.7–15.4)
HGB BLD-MCNC: 14.9 G/DL (ref 11.7–15.4)
IMM GRANULOCYTES # BLD: 0 K/UL (ref 0–0.5)
IMM GRANULOCYTES NFR BLD AUTO: 0 % (ref 0–5)
INR PPP: 1.1
LACTATE BLD-SCNC: 1.09 MMOL/L (ref 0.5–1.9)
LIPASE SERPL-CCNC: 167 U/L (ref 73–393)
LYMPHOCYTES # BLD: 3.9 K/UL (ref 0.5–4.6)
LYMPHOCYTES NFR BLD: 30 % (ref 13–44)
MCH RBC QN AUTO: 32.6 PG (ref 26.1–32.9)
MCHC RBC AUTO-ENTMCNC: 32.4 G/DL (ref 31.4–35)
MCV RBC AUTO: 100.7 FL (ref 79.6–97.8)
MONOCYTES # BLD: 0.8 K/UL (ref 0.1–1.3)
MONOCYTES NFR BLD: 6 % (ref 4–12)
NEUTS SEG # BLD: 8.3 K/UL (ref 1.7–8.2)
NEUTS SEG NFR BLD: 63 % (ref 43–78)
NRBC # BLD: 0 K/UL (ref 0–0.2)
P-R INTERVAL, ECG05: 134 MS
PLATELET # BLD AUTO: 395 K/UL (ref 150–450)
PMV BLD AUTO: 9 FL (ref 9.4–12.3)
POTASSIUM SERPL-SCNC: 3.8 MMOL/L (ref 3.5–5.1)
PROCALCITONIN SERPL-MCNC: 0.1 NG/ML
PROT SERPL-MCNC: 8.3 G/DL (ref 6.3–8.2)
PROTHROMBIN TIME: 14 SEC (ref 11.7–14.5)
Q-T INTERVAL, ECG07: 336 MS
QRS DURATION, ECG06: 84 MS
QTC CALCULATION (BEZET), ECG08: 437 MS
RBC # BLD AUTO: 4.57 M/UL (ref 4.05–5.2)
SODIUM SERPL-SCNC: 136 MMOL/L (ref 136–145)
SPECIMEN EXP DATE BLD: NORMAL
VENTRICULAR RATE, ECG03: 102 BPM
WBC # BLD AUTO: 13.2 K/UL (ref 4.3–11.1)

## 2018-12-18 PROCEDURE — 74022 RADEX COMPL AQT ABD SERIES: CPT

## 2018-12-18 PROCEDURE — 36415 COLL VENOUS BLD VENIPUNCTURE: CPT

## 2018-12-18 PROCEDURE — 85610 PROTHROMBIN TIME: CPT

## 2018-12-18 PROCEDURE — 83605 ASSAY OF LACTIC ACID: CPT

## 2018-12-18 PROCEDURE — 85014 HEMATOCRIT: CPT

## 2018-12-18 PROCEDURE — 86901 BLOOD TYPING SEROLOGIC RH(D): CPT

## 2018-12-18 PROCEDURE — C9113 INJ PANTOPRAZOLE SODIUM, VIA: HCPCS | Performed by: INTERNAL MEDICINE

## 2018-12-18 PROCEDURE — 74011250636 HC RX REV CODE- 250/636: Performed by: EMERGENCY MEDICINE

## 2018-12-18 PROCEDURE — 99285 EMERGENCY DEPT VISIT HI MDM: CPT | Performed by: EMERGENCY MEDICINE

## 2018-12-18 PROCEDURE — 96375 TX/PRO/DX INJ NEW DRUG ADDON: CPT | Performed by: EMERGENCY MEDICINE

## 2018-12-18 PROCEDURE — 77030020263 HC SOL INJ SOD CL0.9% LFCR 1000ML

## 2018-12-18 PROCEDURE — 77030032490 HC SLV COMPR SCD KNE COVD -B

## 2018-12-18 PROCEDURE — 99218 HC RM OBSERVATION: CPT

## 2018-12-18 PROCEDURE — 96361 HYDRATE IV INFUSION ADD-ON: CPT | Performed by: EMERGENCY MEDICINE

## 2018-12-18 PROCEDURE — 84145 PROCALCITONIN (PCT): CPT

## 2018-12-18 PROCEDURE — 83690 ASSAY OF LIPASE: CPT

## 2018-12-18 PROCEDURE — 80053 COMPREHEN METABOLIC PANEL: CPT

## 2018-12-18 PROCEDURE — 74011250636 HC RX REV CODE- 250/636: Performed by: INTERNAL MEDICINE

## 2018-12-18 PROCEDURE — C9113 INJ PANTOPRAZOLE SODIUM, VIA: HCPCS | Performed by: EMERGENCY MEDICINE

## 2018-12-18 PROCEDURE — 93005 ELECTROCARDIOGRAM TRACING: CPT | Performed by: EMERGENCY MEDICINE

## 2018-12-18 PROCEDURE — 74011000250 HC RX REV CODE- 250: Performed by: EMERGENCY MEDICINE

## 2018-12-18 PROCEDURE — 81003 URINALYSIS AUTO W/O SCOPE: CPT | Performed by: EMERGENCY MEDICINE

## 2018-12-18 PROCEDURE — 96374 THER/PROPH/DIAG INJ IV PUSH: CPT | Performed by: EMERGENCY MEDICINE

## 2018-12-18 PROCEDURE — 85025 COMPLETE CBC W/AUTO DIFF WBC: CPT

## 2018-12-18 PROCEDURE — 74011250637 HC RX REV CODE- 250/637: Performed by: INTERNAL MEDICINE

## 2018-12-18 RX ORDER — ONDANSETRON 2 MG/ML
4 INJECTION INTRAMUSCULAR; INTRAVENOUS
Status: DISCONTINUED | OUTPATIENT
Start: 2018-12-18 | End: 2018-12-21 | Stop reason: HOSPADM

## 2018-12-18 RX ORDER — MORPHINE SULFATE 15 MG/1
15 TABLET ORAL
Status: DISCONTINUED | OUTPATIENT
Start: 2018-12-18 | End: 2018-12-21 | Stop reason: HOSPADM

## 2018-12-18 RX ORDER — SODIUM CHLORIDE 9 MG/ML
150 INJECTION, SOLUTION INTRAVENOUS CONTINUOUS
Status: DISCONTINUED | OUTPATIENT
Start: 2018-12-18 | End: 2018-12-21 | Stop reason: HOSPADM

## 2018-12-18 RX ORDER — HYDROCODONE BITARTRATE AND ACETAMINOPHEN 10; 325 MG/1; MG/1
1 TABLET ORAL
Status: ON HOLD | COMMUNITY
End: 2018-12-21 | Stop reason: SDUPTHER

## 2018-12-18 RX ORDER — SODIUM CHLORIDE 0.9 % (FLUSH) 0.9 %
5-10 SYRINGE (ML) INJECTION AS NEEDED
Status: DISCONTINUED | OUTPATIENT
Start: 2018-12-18 | End: 2018-12-21 | Stop reason: HOSPADM

## 2018-12-18 RX ORDER — MORPHINE SULFATE 2 MG/ML
4 INJECTION, SOLUTION INTRAMUSCULAR; INTRAVENOUS ONCE
Status: COMPLETED | OUTPATIENT
Start: 2018-12-18 | End: 2018-12-18

## 2018-12-18 RX ORDER — MORPHINE SULFATE 10 MG/ML
5 INJECTION, SOLUTION INTRAMUSCULAR; INTRAVENOUS
Status: DISCONTINUED | OUTPATIENT
Start: 2018-12-18 | End: 2018-12-20

## 2018-12-18 RX ORDER — ONDANSETRON 2 MG/ML
4 INJECTION INTRAMUSCULAR; INTRAVENOUS
Status: COMPLETED | OUTPATIENT
Start: 2018-12-18 | End: 2018-12-18

## 2018-12-18 RX ORDER — MORPHINE SULFATE 15 MG/1
15 TABLET, FILM COATED, EXTENDED RELEASE ORAL
Status: DISCONTINUED | OUTPATIENT
Start: 2018-12-18 | End: 2018-12-18 | Stop reason: SDUPTHER

## 2018-12-18 RX ORDER — PANTOPRAZOLE SODIUM 40 MG/10ML
40 INJECTION, POWDER, LYOPHILIZED, FOR SOLUTION INTRAVENOUS EVERY 12 HOURS
Status: DISCONTINUED | OUTPATIENT
Start: 2018-12-18 | End: 2018-12-18 | Stop reason: SDUPTHER

## 2018-12-18 RX ORDER — SODIUM CHLORIDE 0.9 % (FLUSH) 0.9 %
5-10 SYRINGE (ML) INJECTION EVERY 8 HOURS
Status: DISCONTINUED | OUTPATIENT
Start: 2018-12-18 | End: 2018-12-21 | Stop reason: HOSPADM

## 2018-12-18 RX ADMIN — MORPHINE SULFATE 5 MG: 10 INJECTION INTRAVENOUS at 23:00

## 2018-12-18 RX ADMIN — ONDANSETRON 4 MG: 2 INJECTION INTRAMUSCULAR; INTRAVENOUS at 22:50

## 2018-12-18 RX ADMIN — ONDANSETRON 4 MG: 2 INJECTION INTRAMUSCULAR; INTRAVENOUS at 16:04

## 2018-12-18 RX ADMIN — PANTOPRAZOLE SODIUM 40 MG: 40 INJECTION, POWDER, FOR SOLUTION INTRAVENOUS at 18:11

## 2018-12-18 RX ADMIN — SODIUM CHLORIDE 150 ML/HR: 900 INJECTION, SOLUTION INTRAVENOUS at 21:43

## 2018-12-18 RX ADMIN — MORPHINE SULFATE 15 MG: 15 TABLET ORAL at 18:14

## 2018-12-18 RX ADMIN — SODIUM CHLORIDE 150 ML/HR: 900 INJECTION, SOLUTION INTRAVENOUS at 15:18

## 2018-12-18 RX ADMIN — PANTOPRAZOLE SODIUM 40 MG: 40 INJECTION, POWDER, FOR SOLUTION INTRAVENOUS at 12:16

## 2018-12-18 RX ADMIN — SODIUM CHLORIDE 1000 ML: 900 INJECTION, SOLUTION INTRAVENOUS at 12:16

## 2018-12-18 RX ADMIN — ONDANSETRON 4 MG: 2 INJECTION INTRAMUSCULAR; INTRAVENOUS at 18:14

## 2018-12-18 RX ADMIN — Medication 10 ML: at 18:12

## 2018-12-18 RX ADMIN — MORPHINE SULFATE 4 MG: 2 INJECTION, SOLUTION INTRAMUSCULAR; INTRAVENOUS at 16:50

## 2018-12-18 NOTE — H&P
HOSPITALIST H&P      NAME:  Briseyda Chu   Age:  46 y.o.  :   1965   MRN:   [id number]    PCP: None    Attending MD: Devan Harmon DO    Treatment Team: Primary Nurse: Claude Williamson; Consulting Provider: Kulwant Harris MD    HPI:     Briseyda Chu is a 46year old CF with a PMH of GERD, OA, chronic back pain, and kidney stones presented to the ER with 12 hours of black tarry stools and vomiting dark material with associated upper abdominal pain. She states that she started \"not feeling right\" 4 days prior to admission and was just lying in bed for 3 days. The evening prior to admission she started having abdominal pain with cramping. She then went to bed and woke up at 3am with nausea and started vomiting dark material, followed by black stools. She continued to vomiting and have epigastric pain so she came to the ER. She denies NSAID use. In the ER she was found to be tachycardic, have an elevated BUN, and abdominal pain. Complete ROS done and is as stated in HPI or otherwise negative    Past Medical History:   Diagnosis Date    Arthritis     Gastrointestinal disorder     GERD, hiatal hernia    GERD (gastroesophageal reflux disease)     Kidney stone     Migraines     Nausea and vomiting 2018    Pyelonephritis, acute 2011    Pyelonephritis, acute         Past Surgical History:   Procedure Laterality Date    HX GYN      hysterectomy    HX OTHER SURGICAL      kidney stone surgery        Prior to Admission Medications   Prescriptions Last Dose Informant Patient Reported? Taking? morphine IR (MS IR) 15 mg tablet   No No   Sig: Take 1 Tab by mouth every six (6) hours as needed for Pain. Max Daily Amount: 60 mg.   omeprazole (PRILOSEC) 20 mg capsule   Yes No   Sig: Take 20 mg by mouth daily. ondansetron (ZOFRAN ODT) 4 mg disintegrating tablet   No No   Sig: Take 1 Tab by mouth every eight (8) hours as needed for Nausea.       Facility-Administered Medications: None       Allergies   Allergen Reactions    Darvocet-N 100 [Propoxyphene N-Acetaminophen] Nausea and Vomiting    Toradol [Ketorolac Tromethamine] Rash     Pt given IV Toradol in 2009 with NO resulting rash. Patient did not want to take Toradol on 11 admission due to the reaction she had in . Social History     Tobacco Use    Smoking status: Current Every Day Smoker     Packs/day: 1.00     Years: 30.00     Pack years: 30.00    Smokeless tobacco: Never Used   Substance Use Topics    Alcohol use: No        Family History   Problem Relation Age of Onset    Diabetes Mother     No Known Problems Father         Objective:       Visit Vitals  /64   Pulse 99   Temp 97.7 °F (36.5 °C)   Resp 25   Ht 5' 3\" (1.6 m)   Wt 59.4 kg (131 lb)   SpO2 95%   BMI 23.21 kg/m²        Temp (24hrs), Av.7 °F (36.5 °C), Min:97.7 °F (36.5 °C), Max:97.7 °F (36.5 °C)      Oxygen Therapy  O2 Sat (%): 95 % (18 1330)  Pulse via Oximetry: 99 beats per minute (18 1330)  O2 Device: Room air (18 1153)      Physical Exam:    General:    Alert, cooperative, no distress, appears stated age. Eyes:   PERRLA EOMI Anicteric  Head:   Normocephalic, without obvious abnormality, atraumatic. ENT:  Nares normal. No drainage. Lungs:   Clear to auscultation bilaterally. No Wheezing or Rhonchi. No rales. Heart:   Tachy, reg rhythm,  no murmur, rub or gallop. No JVD. Abdomen:   Soft, epigastric tender. Not distended. Bowel sounds normal.   MSK:  No edema. No clubbing or cyanosis. No deformities/lesions. Skin:     Texture, turgor normal. No rashes or lesions. No Jaundice. Neurologic: Alert and oriented x 3, no focal deficits   Psychiatry:      No depression/anxiety. Mood congruent for context. Heme/Lymph/Immune: No echymoses or overt signs of bleeding.      Lab/Data Review:  Recent Results (from the past 24 hour(s))   CBC WITH AUTOMATED DIFF    Collection Time: 18 12:02 PM Result Value Ref Range    WBC 13.2 (H) 4.3 - 11.1 K/uL    RBC 4.57 4.05 - 5.2 M/uL    HGB 14.9 11.7 - 15.4 g/dL    HCT 46.0 35.8 - 46.3 %    .7 (H) 79.6 - 97.8 FL    MCH 32.6 26.1 - 32.9 PG    MCHC 32.4 31.4 - 35.0 g/dL    RDW 13.0 11.9 - 14.6 %    PLATELET 354 202 - 098 K/uL    MPV 9.0 (L) 9.4 - 12.3 FL    ABSOLUTE NRBC 0.00 0.0 - 0.2 K/uL    DF AUTOMATED      NEUTROPHILS 63 43 - 78 %    LYMPHOCYTES 30 13 - 44 %    MONOCYTES 6 4.0 - 12.0 %    EOSINOPHILS 0 (L) 0.5 - 7.8 %    BASOPHILS 1 0.0 - 2.0 %    IMMATURE GRANULOCYTES 0 0.0 - 5.0 %    ABS. NEUTROPHILS 8.3 (H) 1.7 - 8.2 K/UL    ABS. LYMPHOCYTES 3.9 0.5 - 4.6 K/UL    ABS. MONOCYTES 0.8 0.1 - 1.3 K/UL    ABS. EOSINOPHILS 0.0 0.0 - 0.8 K/UL    ABS. BASOPHILS 0.1 0.0 - 0.2 K/UL    ABS. IMM. GRANS. 0.0 0.0 - 0.5 K/UL   METABOLIC PANEL, COMPREHENSIVE    Collection Time: 12/18/18 12:02 PM   Result Value Ref Range    Sodium 136 136 - 145 mmol/L    Potassium 3.8 3.5 - 5.1 mmol/L    Chloride 99 98 - 107 mmol/L    CO2 24 21 - 32 mmol/L    Anion gap 13 7 - 16 mmol/L    Glucose 111 (H) 65 - 100 mg/dL    BUN 45 (H) 6 - 23 MG/DL    Creatinine 1.09 (H) 0.6 - 1.0 MG/DL    GFR est AA >60 >60 ml/min/1.73m2    GFR est non-AA 56 (L) >60 ml/min/1.73m2    Calcium 9.7 8.3 - 10.4 MG/DL    Bilirubin, total 0.4 0.2 - 1.1 MG/DL    ALT (SGPT) 21 12 - 65 U/L    AST (SGOT) 8 (L) 15 - 37 U/L    Alk.  phosphatase 149 (H) 50 - 136 U/L    Protein, total 8.3 (H) 6.3 - 8.2 g/dL    Albumin 4.0 3.5 - 5.0 g/dL    Globulin 4.3 (H) 2.3 - 3.5 g/dL    A-G Ratio 0.9 (L) 1.2 - 3.5     LIPASE    Collection Time: 12/18/18 12:02 PM   Result Value Ref Range    Lipase 167 73 - 393 U/L   PROCALCITONIN    Collection Time: 12/18/18 12:02 PM   Result Value Ref Range    Procalcitonin 0.1 ng/mL   EKG, 12 LEAD, INITIAL    Collection Time: 12/18/18 12:27 PM   Result Value Ref Range    Ventricular Rate 102 BPM    Atrial Rate 102 BPM    P-R Interval 134 ms    QRS Duration 84 ms    Q-T Interval 336 ms    QTC Calculation (Bezet) 437 ms    Calculated P Axis 67 degrees    Calculated R Axis 82 degrees    Calculated T Axis 83 degrees    Diagnosis       !! AGE AND GENDER SPECIFIC ECG ANALYSIS !! Sinus tachycardia with Fusion complexes  Possible Anterior infarct (cited on or before 25-JUN-2011)  Abnormal ECG  When compared with ECG of 31-JUL-2018 13:06,  Fusion complexes are now Present  Questionable change in QRS axis     POC LACTIC ACID    Collection Time: 12/18/18  1:29 PM   Result Value Ref Range    Lactic Acid (POC) 1.09 0.5 - 1.9 mmol/L       Imaging:  Xr Abd Acute W 1 V Chest    Result Date: 12/18/2018  IMPRESSION:  No acute findings in the chest or abdomen. Cultures:   All Micro Results     None          Assessment/Plan:     Principal Problem:    Melena (12/18/2018)  - Concerning for UGIB  - Start Pantoprazole 40mg IV BID  - Consult GI for possible endoscopy  - Trend Hgb Q8H    Active Problems:    Acute prerenal azotemia (12/18/2018)  - Likely due to UGIB + dehydration  - Start normal saline @ 150 ml/hr  - Check BMP daily      Acute epigastric pain (12/18/2018)  - Concerning for PUD  - Lipase normal  - Start Pantoprazole BID  - Consult GI  - Start PRN Morphine      Nausea and vomiting (12/18/2018)  - Likely due to gastritis vs. PUD vs viral  - Start Zofran  - Monitor for changes      SIRS of non-infectious origin w/o acute organ dysfunction ([facility] [address] (12/18/2018)  - Likely due to UGIB  - No s/s of active infection  - Start normal saline  - Check UA  - Lactic acid normal      Leukocytosis (12/18/2018)  - Likely reactive  - No abx at this point  - Check UA  - Check CBC daily      Sinus tachycardia (12/18/2018)  - Likely due to #1  - Start IVFs      GERD (gastroesophageal reflux disease) (12/18/2018)  - Start Pantoprazole IV BID      Code Status: FULL CODE  DVT Prophylaxis: SCDs    Anticipated discharge: 48 hours    Dinora Mora DO  1:54 PM

## 2018-12-18 NOTE — ED NOTES
TRANSFER - OUT REPORT:    Verbal report given to [address] [place] [address] on Juancho Chiang  being transferred to 8th floor for routine progression of care       Report consisted of patients Situation, Background, Assessment and   Recommendations(SBAR). Information from the following report(s) ED Summary was reviewed with the receiving nurse. Lines:   Peripheral IV 12/18/18 Right Antecubital (Active)   Phlebitis Assessment 0 12/18/2018 12:00 PM   Infiltration Assessment 0 12/18/2018 12:00 PM   Dressing Status Clean, dry, & intact 12/18/2018 12:00 PM        Opportunity for questions and clarification was provided. Patient transported with:   Pt belongings.

## 2018-12-18 NOTE — CONSULTS
[facility] Consult Note       Primary GI Physician: None    Referring Provider:  Dr Kristel Benjamin    Consult Date:  12/18/2018    Admit Date:  12/18/2018    Chief Complaint:  Epigastric pain and Hematemesis    Subjective:     History of Present Illness:  Patient is a 46 y.o. female with PMH of OA, GERD, kidney stones, migraines, who is seen in consultation at the request of Dr. Pelon Gonsales for epigastric pain and hematemesis. She reports sudden onset buring epigastric abd pain on Saturday with radiation to her back and N/V w coffee ground emesis this am.  She denies NSAID use or ETOH. Takes Morphine and tylenol for chronic pain. Reports similar presentation 3-4 months ago with out treatment/workup. She is s/p lap mona 11/14/17 for chronic cholecystitis and gallstones by Dr Maggi Gongora. Hgb on admission normal at 14.9 w elevated WBC at 13.2. TB normal t 0.4 w elevated  with normal AST/ALT. Takes Prilosec 20 mg daily and generally reports good GERD control. Daily smoker. Denies prior GIB or PUD. No prior EGD. Reports colo > 5 years ago findings unknown. Denies BRRB. Reports dark stool starting Sat also. Abdominal Series 12/18/18  FINDINGS: The lungs are clear. There are no infiltrates or effusions. The heart  size is normal.  There are old bilateral rib fractures. Flat and upright views of the abdomen show a normal bowel gas pattern. There is  no evidence of obstruction. There is no free air. No renal calculi are seen. There are cholecystectomy changes. IMPRESSION:  No acute findings in the chest or abdomen.     PMH:  Past Medical History:   Diagnosis Date    Arthritis     Gastrointestinal disorder     GERD, hiatal hernia    GERD (gastroesophageal reflux disease)     Kidney stone     Migraines     Pyelonephritis, acute 6/30/2011    Pyelonephritis, acute        PSH:  Past Surgical History:   Procedure Laterality Date    HX GYN      hysterectomy    HX OTHER SURGICAL      kidney stone surgery       Allergies: Allergies   Allergen Reactions    Darvocet-N 100 [Propoxyphene N-Acetaminophen] Nausea and Vomiting    Toradol [Ketorolac Tromethamine] Rash     Pt given IV Toradol in November 2009 with NO resulting rash. Patient did not want to take Toradol on 6/25/11 admission due to the reaction she had in 2008. Home Medications:  Prior to Admission medications    Medication Sig Start Date End Date Taking? Authorizing Provider   ondansetron (ZOFRAN ODT) 4 mg disintegrating tablet Take 1 Tab by mouth every eight (8) hours as needed for Nausea. 7/31/18   Boo Mckeon MD   morphine IR (MS IR) 15 mg tablet Take 1 Tab by mouth every six (6) hours as needed for Pain. Max Daily Amount: 60 mg. 7/31/18   Boo Mckeon MD   omeprazole (PRILOSEC) 20 mg capsule Take 20 mg by mouth daily. Other, MD Alyssia       Hospital Medications:  Current Facility-Administered Medications   Medication Dose Route Frequency    morphine injection 4 mg  4 mg IntraVENous ONCE    ondansetron (ZOFRAN) injection 4 mg  4 mg IntraVENous NOW     Current Outpatient Medications   Medication Sig    ondansetron (ZOFRAN ODT) 4 mg disintegrating tablet Take 1 Tab by mouth every eight (8) hours as needed for Nausea.  morphine IR (MS IR) 15 mg tablet Take 1 Tab by mouth every six (6) hours as needed for Pain. Max Daily Amount: 60 mg.    omeprazole (PRILOSEC) 20 mg capsule Take 20 mg by mouth daily. Social History:  Social History     Tobacco Use    Smoking status: Current Every Day Smoker     Packs/day: 1.00     Years: 30.00     Pack years: 30.00    Smokeless tobacco: Never Used   Substance Use Topics    Alcohol use: No       Pt denies any history of drug use, blood transfusions, or tattoos.     Family History:  Family History   Problem Relation Age of Onset    Diabetes Mother     No Known Problems Father        Review of Systems:  A detailed 10 system ROS is obtained, with pertinent positives as listed above. All others are negative. Diet:  NPO    Objective:     Physical Exam:  Vitals:  Visit Vitals  /64   Pulse 99   Temp 97.7 °F (36.5 °C)   Resp 25   Ht 5' 3\" (1.6 m)   Wt 59.4 kg (131 lb)   SpO2 95%   BMI 23.21 kg/m²     Gen:  Pt is alert, cooperative, no acute distress  Skin:  Extremities and face reveal no rashes. HEENT: Sclerae anicteric. Extra-occular muscles are intact. No oral ulcers. No abnormal pigmentation of the lips. The neck is supple. Cardiovascular: Regular rate and rhythm. No murmurs, gallops, or rubs. Respiratory:  Comfortable breathing with no accessory muscle use. Clear breath sounds anteriorly with no wheezes, rales, or rhonchi. GI:  Abdomen nondistended, soft. Mod ttp in epigastrium. Normal active bowel sounds. No enlargement of the liver or spleen. No masses palpable. Rectal:  Deferred  Musculoskeletal:  No pitting edema of the lower legs. Neurological:  Gross memory appears intact. Patient is alert and oriented. Psychiatric:  Mood appears appropriate with judgement intact. Laboratory:    Recent Labs     12/18/18  1202   WBC 13.2*   HGB 14.9   HCT 46.0      .7*      K 3.8   CL 99   CO2 24   BUN 45*   CREA 1.09*   CA 9.7   *   *   SGOT 8*   ALT 21   TBILI 0.4   ALB 4.0   TP 8.3*   LPSE 167          Assessment:     Active Problems:    Melena (12/18/2018)      Acute prerenal azotemia (12/18/2018)      46 y.o. female with PMH of OA, GERD, kidney stones, migraines admitted w epigastric pain, coffee ground emesis, elevated WBC at 13.2. She denies NSAID or ETOH use. Takes Omeprazole 20 mg for GERD. Smokes daily. S/P lap mona last year with stones. LFT's normal except elevated AP at 149. Denies BRRB, reports dark stool starting Sat.       Plan:     - Antiemetics as needed  - PPI BID  - NPO after MN for EGD tomrrow    Ragena Cea, NP  Patient is seen and examined in collaboration with Dr. Rosmery Roland Brenda. Assessment and plan as per Dr. Vickie Rodríguez.

## 2018-12-18 NOTE — ED TRIAGE NOTES
Patient complains of upper abdominal pain that radiates into her back since Saturday with nausea, vomiting and black stools that started around 3 am today. Patient also states the she has been vomiting dark red blood today.

## 2018-12-18 NOTE — H&P (VIEW-ONLY)
[facility] Consult Note       Primary GI Physician: None    Referring Provider:  Dr Joseph Rahman    Consult Date:  12/18/2018    Admit Date:  12/18/2018    Chief Complaint:  Epigastric pain and Hematemesis    Subjective:     History of Present Illness:  Patient is a 46 y.o. female with PMH of OA, GERD, kidney stones, migraines, who is seen in consultation at the request of Dr. Chelsea Eagle for epigastric pain and hematemesis. She reports sudden onset buring epigastric abd pain on Saturday with radiation to her back and N/V w coffee ground emesis this am.  She denies NSAID use or ETOH. Takes Morphine and tylenol for chronic pain. Reports similar presentation 3-4 months ago with out treatment/workup. She is s/p lap mona 11/14/17 for chronic cholecystitis and gallstones by Dr Renee Perez. Hgb on admission normal at 14.9 w elevated WBC at 13.2. TB normal t 0.4 w elevated  with normal AST/ALT. Takes Prilosec 20 mg daily and generally reports good GERD control. Daily smoker. Denies prior GIB or PUD. No prior EGD. Reports colo > 5 years ago findings unknown. Denies BRRB. Reports dark stool starting Sat also. Abdominal Series 12/18/18  FINDINGS: The lungs are clear. There are no infiltrates or effusions. The heart  size is normal.  There are old bilateral rib fractures. Flat and upright views of the abdomen show a normal bowel gas pattern. There is  no evidence of obstruction. There is no free air. No renal calculi are seen. There are cholecystectomy changes. IMPRESSION:  No acute findings in the chest or abdomen.     PMH:  Past Medical History:   Diagnosis Date    Arthritis     Gastrointestinal disorder     GERD, hiatal hernia    GERD (gastroesophageal reflux disease)     Kidney stone     Migraines     Pyelonephritis, acute 6/30/2011    Pyelonephritis, acute        PSH:  Past Surgical History:   Procedure Laterality Date    HX GYN      hysterectomy    HX OTHER SURGICAL      kidney stone surgery       Allergies: Allergies   Allergen Reactions    Darvocet-N 100 [Propoxyphene N-Acetaminophen] Nausea and Vomiting    Toradol [Ketorolac Tromethamine] Rash     Pt given IV Toradol in November 2009 with NO resulting rash. Patient did not want to take Toradol on 6/25/11 admission due to the reaction she had in 2008. Home Medications:  Prior to Admission medications    Medication Sig Start Date End Date Taking? Authorizing Provider   ondansetron (ZOFRAN ODT) 4 mg disintegrating tablet Take 1 Tab by mouth every eight (8) hours as needed for Nausea. 7/31/18   Gallo Mckeon MD   morphine IR (MS IR) 15 mg tablet Take 1 Tab by mouth every six (6) hours as needed for Pain. Max Daily Amount: 60 mg. 7/31/18   Gallo Mckeon MD   omeprazole (PRILOSEC) 20 mg capsule Take 20 mg by mouth daily. Other, MD Alyssia       Hospital Medications:  Current Facility-Administered Medications   Medication Dose Route Frequency    morphine injection 4 mg  4 mg IntraVENous ONCE    ondansetron (ZOFRAN) injection 4 mg  4 mg IntraVENous NOW     Current Outpatient Medications   Medication Sig    ondansetron (ZOFRAN ODT) 4 mg disintegrating tablet Take 1 Tab by mouth every eight (8) hours as needed for Nausea.  morphine IR (MS IR) 15 mg tablet Take 1 Tab by mouth every six (6) hours as needed for Pain. Max Daily Amount: 60 mg.    omeprazole (PRILOSEC) 20 mg capsule Take 20 mg by mouth daily. Social History:  Social History     Tobacco Use    Smoking status: Current Every Day Smoker     Packs/day: 1.00     Years: 30.00     Pack years: 30.00    Smokeless tobacco: Never Used   Substance Use Topics    Alcohol use: No       Pt denies any history of drug use, blood transfusions, or tattoos.     Family History:  Family History   Problem Relation Age of Onset    Diabetes Mother     No Known Problems Father        Review of Systems:  A detailed 10 system ROS is obtained, with pertinent positives as listed above. All others are negative. Diet:  NPO    Objective:     Physical Exam:  Vitals:  Visit Vitals  /64   Pulse 99   Temp 97.7 °F (36.5 °C)   Resp 25   Ht 5' 3\" (1.6 m)   Wt 59.4 kg (131 lb)   SpO2 95%   BMI 23.21 kg/m²     Gen:  Pt is alert, cooperative, no acute distress  Skin:  Extremities and face reveal no rashes. HEENT: Sclerae anicteric. Extra-occular muscles are intact. No oral ulcers. No abnormal pigmentation of the lips. The neck is supple. Cardiovascular: Regular rate and rhythm. No murmurs, gallops, or rubs. Respiratory:  Comfortable breathing with no accessory muscle use. Clear breath sounds anteriorly with no wheezes, rales, or rhonchi. GI:  Abdomen nondistended, soft. Mod ttp in epigastrium. Normal active bowel sounds. No enlargement of the liver or spleen. No masses palpable. Rectal:  Deferred  Musculoskeletal:  No pitting edema of the lower legs. Neurological:  Gross memory appears intact. Patient is alert and oriented. Psychiatric:  Mood appears appropriate with judgement intact. Laboratory:    Recent Labs     12/18/18  1202   WBC 13.2*   HGB 14.9   HCT 46.0      .7*      K 3.8   CL 99   CO2 24   BUN 45*   CREA 1.09*   CA 9.7   *   *   SGOT 8*   ALT 21   TBILI 0.4   ALB 4.0   TP 8.3*   LPSE 167          Assessment:     Active Problems:    Melena (12/18/2018)      Acute prerenal azotemia (12/18/2018)      46 y.o. female with PMH of OA, GERD, kidney stones, migraines admitted w epigastric pain, coffee ground emesis, elevated WBC at 13.2. She denies NSAID or ETOH use. Takes Omeprazole 20 mg for GERD. Smokes daily. S/P lap mona last year with stones. LFT's normal except elevated AP at 149. Denies BRRB, reports dark stool starting Sat.       Plan:     - Antiemetics as needed  - PPI BID  - NPO after MN for EGD tomrrow    Wei Cloud, NP  Patient is seen and examined in collaboration with Dr. Kylee Smith Brenda. Assessment and plan as per Dr. Kiran Linn.

## 2018-12-18 NOTE — ED PROVIDER NOTES
58-year-old female with history of GERD, arthritis on chronic pain medication who presents with complaint of cramping epigastric pain or sense Saturday that is progressively worsened today with associated melanotic stools over the past several days and 2-3 episodes of hematemesis since this morning. Reports questionable history of previous UGIB. States that she takes 3-4 Norco daily. Denies chronic alcohol use or history of cirrhosis. Denies fever, chills, chest pain, shortness of breath, constipation, urinary symptoms. The history is provided by the patient. No  was used. Abdominal Pain    This is a new problem. The current episode started 2 days ago. The problem occurs constantly. The problem has not changed since onset. The pain is located in the epigastric region. The quality of the pain is cramping. The pain is at a severity of 2/10. The pain is mild. Associated symptoms include melena, nausea and vomiting. Pertinent negatives include no anorexia, no fever, no belching, no diarrhea, no flatus, no hematochezia, no constipation, no dysuria, no frequency, no hematuria, no headaches and no chest pain. The pain is worsened by palpation. The pain is relieved by nothing.         Past Medical History:   Diagnosis Date    Arthritis     Gastrointestinal disorder     GERD, hiatal hernia    GERD (gastroesophageal reflux disease)     Kidney stone     Migraines     Pyelonephritis, acute 6/30/2011    Pyelonephritis, acute        Past Surgical History:   Procedure Laterality Date    HX GYN      hysterectomy    HX OTHER SURGICAL      kidney stone surgery         Family History:   Problem Relation Age of Onset    Diabetes Mother     No Known Problems Father        Social History     Socioeconomic History    Marital status:      Spouse name: Not on file    Number of children: Not on file    Years of education: Not on file    Highest education level: Not on file   Social Needs  Financial resource strain: Not on file   [place] insecurity - worry: Not on file    Food insecurity - inability: Not on file   [employer] needs - medical: Not on file   [employer] needs - non-medical: Not on file   Occupational History    Not on file   Tobacco Use    Smoking status: Current Every Day Smoker     Packs/day: 1.00     Years: 30.00     Pack years: 30.00    Smokeless tobacco: Never Used   Substance and Sexual Activity    Alcohol use: No    Drug use: No    Sexual activity: Not Currently   Other Topics Concern    Not on file   Social History Narrative    Not on file         ALLERGIES: Darvocet-n 100 [propoxyphene n-acetaminophen] and Toradol [ketorolac tromethamine]    Review of Systems   Constitutional: Negative for chills and fever. Respiratory: Negative for cough and shortness of breath. Cardiovascular: Negative for chest pain. Gastrointestinal: Positive for abdominal pain, melena, nausea and vomiting. Negative for anorexia, constipation, diarrhea, flatus and hematochezia. Genitourinary: Negative for dysuria, frequency and hematuria. Neurological: Negative for dizziness, light-headedness and headaches. Psychiatric/Behavioral: Negative for confusion. Vitals:    12/18/18 1153   BP: 91/64   Pulse: (!) 128   Resp: 16   Temp: 97.7 °F (36.5 °C)   SpO2: 96%   Weight: 59.4 kg (131 lb)   Height: 5' 3\" (1.6 m)            Physical Exam   Constitutional: She is oriented to person, place, and time. She appears well-developed and well-nourished. Well appearing and in NAD. HENT:   Head: Normocephalic. MMM. Eyes: Conjunctivae are normal.   Neck: No JVD present. No tracheal deviation present. Cardiovascular: Regular rhythm and normal heart sounds. Tachycardic. Pulses 2+ and equal bilaterally. Pulmonary/Chest: Effort normal and breath sounds normal.   CTAB. Abdominal: Soft. Bowel sounds are normal.   Mild epigastric TTP. Soft, ND. No rebound or guarding.  No CVAT. No RUQ TTP; negative Lang's sign. Genitourinary:   Genitourinary Comments: Rectal exam with melanotic stool. Guaiac positive. No evidence of hemorrhoids or fissures. Musculoskeletal: Normal range of motion. Neurological: She is alert and oriented to person, place, and time. No cranial nerve deficit. Skin: Skin is warm and dry. No rash. Nursing note and vitals reviewed. MDM  Number of Diagnoses or Management Options  UGIB (upper gastrointestinal bleed): new and requires workup  Diagnosis management comments: H&H stable. BUN elevated from typical baseline. Patient with melanotic guaiac positive stool. Type and screen completed. Patient remains tachycardic. Patient is normotensive. Patient with symptoms and exam findings consistent with upper GI bleed. Patient given Protonix. Hospitalist consulted for admission. Amount and/or Complexity of Data Reviewed  Clinical lab tests: ordered and reviewed  Tests in the radiology section of CPT®: ordered and reviewed  Tests in the medicine section of CPT®: ordered and reviewed  Review and summarize past medical records: yes  Independent visualization of images, tracings, or specimens: yes    Risk of Complications, Morbidity, and/or Mortality  Presenting problems: high  Diagnostic procedures: moderate  Management options: moderate    Patient Progress  Patient progress: stable    ED Course as of Dec 18 1320   Tue Dec 18, 2018   1306 XR KUB IMPRESSION:  No acute findings in the chest or abdomen.   [DF]      ED Course User Index  [DF] Neel Trejo MD       Procedures      Results Include:    Recent Results (from the past 24 hour(s))   CBC WITH AUTOMATED DIFF    Collection Time: 18 12:02 PM   Result Value Ref Range    WBC 13.2 (H) 4.3 - 11.1 K/uL    RBC 4.57 4.05 - 5.2 M/uL    HGB 14.9 11.7 - 15.4 g/dL    HCT 46.0 35.8 - 46.3 %    .7 (H) 79.6 - 97.8 FL    MCH 32.6 26.1 - 32.9 PG    MCHC 32.4 31.4 - 35.0 g/dL    RDW 13.0 11.9 - 14.6 %    PLATELET 951 159 - 358 K/uL    MPV 9.0 (L) 9.4 - 12.3 FL    ABSOLUTE NRBC 0.00 0.0 - 0.2 K/uL    DF AUTOMATED      NEUTROPHILS 63 43 - 78 %    LYMPHOCYTES 30 13 - 44 %    MONOCYTES 6 4.0 - 12.0 %    EOSINOPHILS 0 (L) 0.5 - 7.8 %    BASOPHILS 1 0.0 - 2.0 %    IMMATURE GRANULOCYTES 0 0.0 - 5.0 %    ABS. NEUTROPHILS 8.3 (H) 1.7 - 8.2 K/UL    ABS. LYMPHOCYTES 3.9 0.5 - 4.6 K/UL    ABS. MONOCYTES 0.8 0.1 - 1.3 K/UL    ABS. EOSINOPHILS 0.0 0.0 - 0.8 K/UL    ABS. BASOPHILS 0.1 0.0 - 0.2 K/UL    ABS. IMM. GRANS. 0.0 0.0 - 0.5 K/UL   METABOLIC PANEL, COMPREHENSIVE    Collection Time: 12/18/18 12:02 PM   Result Value Ref Range    Sodium 136 136 - 145 mmol/L    Potassium 3.8 3.5 - 5.1 mmol/L    Chloride 99 98 - 107 mmol/L    CO2 24 21 - 32 mmol/L    Anion gap 13 7 - 16 mmol/L    Glucose 111 (H) 65 - 100 mg/dL    BUN 45 (H) 6 - 23 MG/DL    Creatinine 1.09 (H) 0.6 - 1.0 MG/DL    GFR est AA >60 >60 ml/min/1.73m2    GFR est non-AA 56 (L) >60 ml/min/1.73m2    Calcium 9.7 8.3 - 10.4 MG/DL    Bilirubin, total 0.4 0.2 - 1.1 MG/DL    ALT (SGPT) 21 12 - 65 U/L    AST (SGOT) 8 (L) 15 - 37 U/L    Alk. phosphatase 149 (H) 50 - 136 U/L    Protein, total 8.3 (H) 6.3 - 8.2 g/dL    Albumin 4.0 3.5 - 5.0 g/dL    Globulin 4.3 (H) 2.3 - 3.5 g/dL    A-G Ratio 0.9 (L) 1.2 - 3.5     LIPASE    Collection Time: 12/18/18 12:02 PM   Result Value Ref Range    Lipase 167 73 - 393 U/L   EKG, 12 LEAD, INITIAL    Collection Time: 12/18/18 12:27 PM   Result Value Ref Range    Ventricular Rate 102 BPM    Atrial Rate 102 BPM    P-R Interval 134 ms    QRS Duration 84 ms    Q-T Interval 336 ms    QTC Calculation (Bezet) 437 ms    Calculated P Axis 67 degrees    Calculated R Axis 82 degrees    Calculated T Axis 83 degrees    Diagnosis       !! AGE AND GENDER SPECIFIC ECG ANALYSIS !!   Sinus tachycardia with Fusion complexes  Possible Anterior infarct (cited on or before 25-JUN-2011)  Abnormal ECG  When compared with ECG of 31-JUL-2018 13:06,  Fusion complexes are now Present  Questionable change in QRS axis              Frank Madden MD; 12/18/2018 @12:16 PM Voice dictation software was used during the making of this note. This software is not perfect and grammatical and other typographical errors may be present.   This note has not been proofread for errors.  ===================================================================

## 2018-12-19 ENCOUNTER — ANESTHESIA (OUTPATIENT)
Dept: ENDOSCOPY | Age: 53
DRG: 378 | End: 2018-12-19
Payer: SUBSIDIZED

## 2018-12-19 LAB
ANION GAP SERPL CALC-SCNC: 10 MMOL/L (ref 7–16)
BUN SERPL-MCNC: 35 MG/DL (ref 6–23)
CALCIUM SERPL-MCNC: 7.7 MG/DL (ref 8.3–10.4)
CHLORIDE SERPL-SCNC: 110 MMOL/L (ref 98–107)
CO2 SERPL-SCNC: 22 MMOL/L (ref 21–32)
CREAT SERPL-MCNC: 0.57 MG/DL (ref 0.6–1)
ERYTHROCYTE [DISTWIDTH] IN BLOOD BY AUTOMATED COUNT: 13.3 % (ref 11.9–14.6)
GLUCOSE SERPL-MCNC: 71 MG/DL (ref 65–100)
HCT VFR BLD AUTO: 31.1 % (ref 35.8–46.3)
HCT VFR BLD AUTO: 31.7 % (ref 35.8–46.3)
HCT VFR BLD AUTO: 32.2 % (ref 35.8–46.3)
HGB BLD-MCNC: 9.6 G/DL (ref 11.7–15.4)
HGB BLD-MCNC: 9.8 G/DL (ref 11.7–15.4)
HGB BLD-MCNC: 9.9 G/DL (ref 11.7–15.4)
MCH RBC QN AUTO: 32.5 PG (ref 26.1–32.9)
MCHC RBC AUTO-ENTMCNC: 30.9 G/DL (ref 31.4–35)
MCV RBC AUTO: 105.4 FL (ref 79.6–97.8)
NRBC # BLD: 0 K/UL (ref 0–0.2)
PLATELET # BLD AUTO: 205 K/UL (ref 150–450)
PMV BLD AUTO: 9 FL (ref 9.4–12.3)
POTASSIUM SERPL-SCNC: 3.3 MMOL/L (ref 3.5–5.1)
RBC # BLD AUTO: 2.95 M/UL (ref 4.05–5.2)
SODIUM SERPL-SCNC: 142 MMOL/L (ref 136–145)
WBC # BLD AUTO: 7.6 K/UL (ref 4.3–11.1)

## 2018-12-19 PROCEDURE — 76040000025: Performed by: INTERNAL MEDICINE

## 2018-12-19 PROCEDURE — 85014 HEMATOCRIT: CPT

## 2018-12-19 PROCEDURE — C9113 INJ PANTOPRAZOLE SODIUM, VIA: HCPCS | Performed by: INTERNAL MEDICINE

## 2018-12-19 PROCEDURE — 0DB68ZX EXCISION OF STOMACH, VIA NATURAL OR ARTIFICIAL OPENING ENDOSCOPIC, DIAGNOSTIC: ICD-10-PCS | Performed by: INTERNAL MEDICINE

## 2018-12-19 PROCEDURE — 88312 SPECIAL STAINS GROUP 1: CPT

## 2018-12-19 PROCEDURE — 74011250636 HC RX REV CODE- 250/636: Performed by: ANESTHESIOLOGY

## 2018-12-19 PROCEDURE — 99218 HC RM OBSERVATION: CPT

## 2018-12-19 PROCEDURE — 74011250636 HC RX REV CODE- 250/636

## 2018-12-19 PROCEDURE — 74011250636 HC RX REV CODE- 250/636: Performed by: INTERNAL MEDICINE

## 2018-12-19 PROCEDURE — 77030009426 HC FCPS BIOP ENDOSC BSC -B: Performed by: INTERNAL MEDICINE

## 2018-12-19 PROCEDURE — 74011250637 HC RX REV CODE- 250/637: Performed by: INTERNAL MEDICINE

## 2018-12-19 PROCEDURE — 80048 BASIC METABOLIC PNL TOTAL CA: CPT

## 2018-12-19 PROCEDURE — 77030020263 HC SOL INJ SOD CL0.9% LFCR 1000ML

## 2018-12-19 PROCEDURE — 88305 TISSUE EXAM BY PATHOLOGIST: CPT

## 2018-12-19 PROCEDURE — 76060000031 HC ANESTHESIA FIRST 0.5 HR: Performed by: INTERNAL MEDICINE

## 2018-12-19 PROCEDURE — 36415 COLL VENOUS BLD VENIPUNCTURE: CPT

## 2018-12-19 PROCEDURE — 85027 COMPLETE CBC AUTOMATED: CPT

## 2018-12-19 RX ORDER — LIDOCAINE HYDROCHLORIDE 20 MG/ML
INJECTION, SOLUTION EPIDURAL; INFILTRATION; INTRACAUDAL; PERINEURAL AS NEEDED
Status: DISCONTINUED | OUTPATIENT
Start: 2018-12-19 | End: 2018-12-19 | Stop reason: HOSPADM

## 2018-12-19 RX ORDER — SODIUM CHLORIDE, SODIUM LACTATE, POTASSIUM CHLORIDE, CALCIUM CHLORIDE 600; 310; 30; 20 MG/100ML; MG/100ML; MG/100ML; MG/100ML
100 INJECTION, SOLUTION INTRAVENOUS CONTINUOUS
Status: DISPENSED | OUTPATIENT
Start: 2018-12-19 | End: 2018-12-20

## 2018-12-19 RX ORDER — OXYCODONE HYDROCHLORIDE 5 MG/1
5 TABLET ORAL
Status: CANCELLED | OUTPATIENT
Start: 2018-12-19 | End: 2018-12-20

## 2018-12-19 RX ORDER — HYDROMORPHONE HYDROCHLORIDE 2 MG/ML
0.5 INJECTION, SOLUTION INTRAMUSCULAR; INTRAVENOUS; SUBCUTANEOUS
Status: CANCELLED | OUTPATIENT
Start: 2018-12-19

## 2018-12-19 RX ORDER — DIPHENHYDRAMINE HYDROCHLORIDE 50 MG/ML
12.5 INJECTION, SOLUTION INTRAMUSCULAR; INTRAVENOUS
Status: CANCELLED | OUTPATIENT
Start: 2018-12-19 | End: 2018-12-20

## 2018-12-19 RX ORDER — FENTANYL CITRATE 50 UG/ML
100 INJECTION, SOLUTION INTRAMUSCULAR; INTRAVENOUS ONCE
Status: DISPENSED | OUTPATIENT
Start: 2018-12-19 | End: 2018-12-20

## 2018-12-19 RX ORDER — NALOXONE HYDROCHLORIDE 0.4 MG/ML
0.1 INJECTION, SOLUTION INTRAMUSCULAR; INTRAVENOUS; SUBCUTANEOUS AS NEEDED
Status: CANCELLED | OUTPATIENT
Start: 2018-12-19

## 2018-12-19 RX ORDER — LIDOCAINE HYDROCHLORIDE 10 MG/ML
0.1 INJECTION INFILTRATION; PERINEURAL AS NEEDED
Status: DISCONTINUED | OUTPATIENT
Start: 2018-12-19 | End: 2018-12-20

## 2018-12-19 RX ORDER — ALBUTEROL SULFATE 0.83 MG/ML
2.5 SOLUTION RESPIRATORY (INHALATION) AS NEEDED
Status: CANCELLED | OUTPATIENT
Start: 2018-12-19

## 2018-12-19 RX ORDER — PROPOFOL 10 MG/ML
INJECTION, EMULSION INTRAVENOUS
Status: DISCONTINUED | OUTPATIENT
Start: 2018-12-19 | End: 2018-12-19 | Stop reason: HOSPADM

## 2018-12-19 RX ORDER — ONDANSETRON 2 MG/ML
4 INJECTION INTRAMUSCULAR; INTRAVENOUS ONCE
Status: CANCELLED | OUTPATIENT
Start: 2018-12-19 | End: 2018-12-19

## 2018-12-19 RX ORDER — SODIUM CHLORIDE, SODIUM LACTATE, POTASSIUM CHLORIDE, CALCIUM CHLORIDE 600; 310; 30; 20 MG/100ML; MG/100ML; MG/100ML; MG/100ML
100 INJECTION, SOLUTION INTRAVENOUS CONTINUOUS
Status: CANCELLED | OUTPATIENT
Start: 2018-12-19

## 2018-12-19 RX ORDER — MIDAZOLAM HYDROCHLORIDE 1 MG/ML
2 INJECTION, SOLUTION INTRAMUSCULAR; INTRAVENOUS
Status: DISCONTINUED | OUTPATIENT
Start: 2018-12-19 | End: 2018-12-20

## 2018-12-19 RX ORDER — MIDAZOLAM HYDROCHLORIDE 1 MG/ML
2 INJECTION, SOLUTION INTRAMUSCULAR; INTRAVENOUS ONCE
Status: ACTIVE | OUTPATIENT
Start: 2018-12-19 | End: 2018-12-20

## 2018-12-19 RX ORDER — FENTANYL CITRATE 50 UG/ML
INJECTION, SOLUTION INTRAMUSCULAR; INTRAVENOUS AS NEEDED
Status: DISCONTINUED | OUTPATIENT
Start: 2018-12-19 | End: 2018-12-19 | Stop reason: HOSPADM

## 2018-12-19 RX ORDER — POTASSIUM CHLORIDE 20 MEQ/1
40 TABLET, EXTENDED RELEASE ORAL 3 TIMES DAILY
Status: COMPLETED | OUTPATIENT
Start: 2018-12-19 | End: 2018-12-19

## 2018-12-19 RX ORDER — OXYCODONE HYDROCHLORIDE 5 MG/1
10 TABLET ORAL
Status: CANCELLED | OUTPATIENT
Start: 2018-12-19 | End: 2018-12-20

## 2018-12-19 RX ADMIN — ONDANSETRON 4 MG: 2 INJECTION INTRAMUSCULAR; INTRAVENOUS at 08:14

## 2018-12-19 RX ADMIN — MORPHINE SULFATE 5 MG: 10 INJECTION INTRAVENOUS at 08:14

## 2018-12-19 RX ADMIN — SODIUM CHLORIDE 150 ML/HR: 900 INJECTION, SOLUTION INTRAVENOUS at 03:56

## 2018-12-19 RX ADMIN — Medication 10 ML: at 21:38

## 2018-12-19 RX ADMIN — PANTOPRAZOLE SODIUM 40 MG: 40 INJECTION, POWDER, FOR SOLUTION INTRAVENOUS at 16:52

## 2018-12-19 RX ADMIN — POTASSIUM CHLORIDE 40 MEQ: 20 TABLET, EXTENDED RELEASE ORAL at 15:23

## 2018-12-19 RX ADMIN — MORPHINE SULFATE 15 MG: 15 TABLET ORAL at 20:18

## 2018-12-19 RX ADMIN — Medication 10 ML: at 05:18

## 2018-12-19 RX ADMIN — SODIUM CHLORIDE 150 ML/HR: 900 INJECTION, SOLUTION INTRAVENOUS at 20:19

## 2018-12-19 RX ADMIN — PANTOPRAZOLE SODIUM 40 MG: 40 INJECTION, POWDER, FOR SOLUTION INTRAVENOUS at 08:14

## 2018-12-19 RX ADMIN — MORPHINE SULFATE 15 MG: 15 TABLET ORAL at 12:06

## 2018-12-19 RX ADMIN — MORPHINE SULFATE 5 MG: 10 INJECTION INTRAVENOUS at 15:23

## 2018-12-19 RX ADMIN — POTASSIUM CHLORIDE 40 MEQ: 20 TABLET, EXTENDED RELEASE ORAL at 20:18

## 2018-12-19 RX ADMIN — SODIUM CHLORIDE, SODIUM LACTATE, POTASSIUM CHLORIDE, AND CALCIUM CHLORIDE 100 ML/HR: 600; 310; 30; 20 INJECTION, SOLUTION INTRAVENOUS at 12:20

## 2018-12-19 RX ADMIN — PROPOFOL 180 MCG/KG/MIN: 10 INJECTION, EMULSION INTRAVENOUS at 13:49

## 2018-12-19 RX ADMIN — FENTANYL CITRATE 125 MCG: 50 INJECTION, SOLUTION INTRAMUSCULAR; INTRAVENOUS at 13:49

## 2018-12-19 RX ADMIN — LIDOCAINE HYDROCHLORIDE 80 MG: 20 INJECTION, SOLUTION EPIDURAL; INFILTRATION; INTRACAUDAL; PERINEURAL at 13:49

## 2018-12-19 RX ADMIN — ONDANSETRON 4 MG: 2 INJECTION INTRAMUSCULAR; INTRAVENOUS at 20:18

## 2018-12-19 RX ADMIN — Medication 10 ML: at 16:49

## 2018-12-19 NOTE — PROGRESS NOTES
Arrived from ED w/epigastric pain 9/10. Morphine 4mg IV administered upon arrival. ED unable to obtain morphine from pharmacy. Morphine IR PO 15mg administered @1815 in an attempt at longer duration pain relief. NS @150/hr.

## 2018-12-19 NOTE — ANESTHESIA PREPROCEDURE EVALUATION
Anesthetic History   No history of anesthetic complications            Review of Systems / Medical History  Patient summary reviewed, nursing notes reviewed and pertinent labs reviewed    Pulmonary    COPD: mild      Smoker         Neuro/Psych   Within defined limits           Cardiovascular                  Exercise tolerance: <4 METS     GI/Hepatic/Renal     GERD: well controlled           Endo/Other        Arthritis     Other Findings   Comments: Lost 50 pounds in 3 months.            Physical Exam    Airway  Mallampati: II  TM Distance: 4 - 6 cm  Neck ROM: normal range of motion   Mouth opening: Normal     Cardiovascular  Regular rate and rhythm,  S1 and S2 normal,  no murmur, click, rub, or gallop  Rhythm: regular  Rate: normal         Dental    Dentition: Edentulous     Pulmonary            Prolonged expiration     Abdominal         Other Findings            Anesthetic Plan    ASA: 3  Anesthesia type: total IV anesthesia          Induction: Intravenous  Anesthetic plan and risks discussed with: Patient and Family

## 2018-12-19 NOTE — PROGRESS NOTES
Hospitalist Progress Note    Patient: Briseyda Chu MRN: [id number]  SSN: [id number]    YOB: 1965  Age: 46 y.o. Sex: female      Admit Date: 12/18/2018    LOS: 0 days     Subjective:     From H&P: \"47 year old CF with a PMH of GERD, OA, chronic back pain, and kidney stones presented to the ER with 12 hours of black tarry stools and vomiting dark material with associated upper abdominal pain. She states that she started \"not feeling right\" 4 days prior to admission and was just lying in bed for 3 days. The evening prior to admission she started having abdominal pain with cramping. She then went to bed and woke up at 3am with nausea and started vomiting dark material, followed by black stools. She continued to vomiting and have epigastric pain so she came to the ER. She denies NSAID use. In the ER she was found to be tachycardic, have an elevated BUN, and abdominal pain. \"    12/19 - She states that her epigastric pain and nausea have improved. Denies melena since admission. Denies CP/SOB. Review of systems negative except stated above. Objective:     Visit Vitals  BP 92/56 (BP 1 Location: Left arm, BP Patient Position: At rest)   Pulse 80   Temp 98.3 °F (36.8 °C)   Resp 19   Ht 5' 3\" (1.6 m)   Wt 57.3 kg (126 lb 6.4 oz)   SpO2 90%   Breastfeeding? No   BMI 22.39 kg/m²      Oxygen Therapy  O2 Sat (%): 90 % (12/19/18 0732)  Pulse via Oximetry: 93 beats per minute (12/18/18 1500)  O2 Device: Room air (12/18/18 1703)      Intake and Output:     Intake/Output Summary (Last 24 hours) at 12/19/2018 1024  Last data filed at 12/19/2018 0926  Gross per 24 hour   Intake 0 ml   Output 800 ml   Net -800 ml         Physical Exam:   GENERAL: alert, cooperative, no distress, appears stated age  EYE: conjunctivae/corneas clear. PERRL. THROAT & NECK: normal and no erythema or exudates noted.    LUNG: clear to auscultation bilaterally  HEART: regular rate and rhythm, S1S2, no murmur, no JVD  ABDOMEN: soft, mild epigastric tender, non-distended. Bowel sounds normal.   EXTREMITIES:  No edema, 2+ pedal/radial pulses bilaterally  SKIN: no rash or abnormalities  NEUROLOGIC: A&Ox3. Cranial nerves 2-12 grossly intact. Lab/Data Review:  Recent Results (from the past 24 hour(s))   TYPE & SCREEN    Collection Time: 12/18/18 12:01 PM   Result Value Ref Range    Crossmatch Expiration 12/21/2018     ABO/Rh(D) A NEGATIVE     Antibody screen NEG    CBC WITH AUTOMATED DIFF    Collection Time: 12/18/18 12:02 PM   Result Value Ref Range    WBC 13.2 (H) 4.3 - 11.1 K/uL    RBC 4.57 4.05 - 5.2 M/uL    HGB 14.9 11.7 - 15.4 g/dL    HCT 46.0 35.8 - 46.3 %    .7 (H) 79.6 - 97.8 FL    MCH 32.6 26.1 - 32.9 PG    MCHC 32.4 31.4 - 35.0 g/dL    RDW 13.0 11.9 - 14.6 %    PLATELET 345 554 - 088 K/uL    MPV 9.0 (L) 9.4 - 12.3 FL    ABSOLUTE NRBC 0.00 0.0 - 0.2 K/uL    DF AUTOMATED      NEUTROPHILS 63 43 - 78 %    LYMPHOCYTES 30 13 - 44 %    MONOCYTES 6 4.0 - 12.0 %    EOSINOPHILS 0 (L) 0.5 - 7.8 %    BASOPHILS 1 0.0 - 2.0 %    IMMATURE GRANULOCYTES 0 0.0 - 5.0 %    ABS. NEUTROPHILS 8.3 (H) 1.7 - 8.2 K/UL    ABS. LYMPHOCYTES 3.9 0.5 - 4.6 K/UL    ABS. MONOCYTES 0.8 0.1 - 1.3 K/UL    ABS. EOSINOPHILS 0.0 0.0 - 0.8 K/UL    ABS. BASOPHILS 0.1 0.0 - 0.2 K/UL    ABS. IMM. GRANS. 0.0 0.0 - 0.5 K/UL   METABOLIC PANEL, COMPREHENSIVE    Collection Time: 12/18/18 12:02 PM   Result Value Ref Range    Sodium 136 136 - 145 mmol/L    Potassium 3.8 3.5 - 5.1 mmol/L    Chloride 99 98 - 107 mmol/L    CO2 24 21 - 32 mmol/L    Anion gap 13 7 - 16 mmol/L    Glucose 111 (H) 65 - 100 mg/dL    BUN 45 (H) 6 - 23 MG/DL    Creatinine 1.09 (H) 0.6 - 1.0 MG/DL    GFR est AA >60 >60 ml/min/1.73m2    GFR est non-AA 56 (L) >60 ml/min/1.73m2    Calcium 9.7 8.3 - 10.4 MG/DL    Bilirubin, total 0.4 0.2 - 1.1 MG/DL    ALT (SGPT) 21 12 - 65 U/L    AST (SGOT) 8 (L) 15 - 37 U/L    Alk.  phosphatase 149 (H) 50 - 136 U/L    Protein, total 8.3 (H) 6.3 - 8.2 g/dL    Albumin 4.0 3.5 - 5.0 g/dL    Globulin 4.3 (H) 2.3 - 3.5 g/dL    A-G Ratio 0.9 (L) 1.2 - 3.5     LIPASE    Collection Time: 12/18/18 12:02 PM   Result Value Ref Range    Lipase 167 73 - 393 U/L   PROCALCITONIN    Collection Time: 12/18/18 12:02 PM   Result Value Ref Range    Procalcitonin 0.1 ng/mL   EKG, 12 LEAD, INITIAL    Collection Time: 12/18/18 12:27 PM   Result Value Ref Range    Ventricular Rate 102 BPM    Atrial Rate 102 BPM    P-R Interval 134 ms    QRS Duration 84 ms    Q-T Interval 336 ms    QTC Calculation (Bezet) 437 ms    Calculated P Axis 67 degrees    Calculated R Axis 82 degrees    Calculated T Axis 83 degrees    Diagnosis       !! AGE AND GENDER SPECIFIC ECG ANALYSIS !!   Sinus tachycardia  Possible Anterior infarct (cited on or before 25-JUN-2011)  Abnormal ECG  When compared with ECG of 31-JUL-2018 13:06,  Questionable change in QRS axis  Confirmed by ST GEOVANNY GONG MD (), SHASHANK WANG ([id number]) on 12/18/2018 2:08:10 PM     POC LACTIC ACID    Collection Time: 12/18/18  1:29 PM   Result Value Ref Range    Lactic Acid (POC) 1.09 0.5 - 1.9 mmol/L   PROTHROMBIN TIME + INR    Collection Time: 12/18/18  3:40 PM   Result Value Ref Range    Prothrombin time 14.0 11.7 - 14.5 sec    INR 1.1     HGB & HCT    Collection Time: 12/18/18  8:49 PM   Result Value Ref Range    HGB 11.5 (L) 11.7 - 15.4 g/dL    HCT 36.3 35.8 - 76.8 %   METABOLIC PANEL, BASIC    Collection Time: 12/19/18  4:21 AM   Result Value Ref Range    Sodium 142 136 - 145 mmol/L    Potassium 3.3 (L) 3.5 - 5.1 mmol/L    Chloride 110 (H) 98 - 107 mmol/L    CO2 22 21 - 32 mmol/L    Anion gap 10 7 - 16 mmol/L    Glucose 71 65 - 100 mg/dL    BUN 35 (H) 6 - 23 MG/DL    Creatinine 0.57 (L) 0.6 - 1.0 MG/DL    GFR est AA >60 >60 ml/min/1.73m2    GFR est non-AA >60 >60 ml/min/1.73m2    Calcium 7.7 (L) 8.3 - 10.4 MG/DL   HGB & HCT    Collection Time: 12/19/18  4:21 AM   Result Value Ref Range    HGB 9.9 (L) 11.7 - 15.4 g/dL    HCT 32.2 (L) 35.8 - 46.3 %       Imaging:  Xr Abd Acute W 1 V Chest    Result Date: 12/18/2018  IMPRESSION:  No acute findings in the chest or abdomen. No results found for this visit on 12/18/18. Cultures: All Micro Results     None          Assessment/Plan:     Principal Problem:    Acute Blood Loss Anemia (12/19/2018)  - Hgb dropped 14.9 --> 11.5 --> 9.9 over night  - No further melanotic stools since admission  - To go for EGD today  - Trend H/H     Active Problems:    Melena (12/18/2018)  - Concerning for UGIB  - Continue Pantoprazole 40mg IV BID  - To get EGD today  - Trend Hgb Q8H  - Appreciate GI's input and assistance      Acute prerenal azotemia (12/18/2018)  - Likely due to UGIB + dehydration  - Continue normal saline @ 150 ml/hr  - Check BMP daily       Acute epigastric pain (12/18/2018)  - Concerning for PUD  - Lipase normal  - Continue Pantoprazole BID  - PRN Morphine  - Appreciate GI's input and assistance       Nausea and vomiting (12/18/2018)  - Likely due to gastritis vs. PUD vs viral  - PRN Zofran  - Monitor for changes       SIRS of non-infectious origin w/o acute organ dysfunction ([address] (12/18/2018)  - Resolving  - Likely due to UGIB  - No s/s of active infection  - Continue normal saline  - Check UA  - Lactic acid normal       Leukocytosis (12/18/2018)  - Likely reactive  - Repeat CBC this AM  - No abx at this point  - Check UA  - Check CBC daily       Sinus tachycardia (12/18/2018)  - Resolved  - Likely due to #1  - Continue IVFs       GERD (gastroesophageal reflux disease) (12/18/2018)  - Pantoprazole IV BID    Dispo - To get EGD this afternoon.  Trend H/H today, likely discharge tomorrow AM unless needs colonoscopy    DIET NPO Past Midnight    DVT Prophylaxis: SCDs      Signed By: Coral Jalloh,      December 19, 2018

## 2018-12-19 NOTE — PROCEDURES
Esophagogastroduodenoscopy    DATE of PROCEDURE: 12/19/2018    INDICATION:  1. Epigastric pain  2. GI bleed    POSTPROCEDURE DIAGNOSIS:  1. Hiatal hernia  2. Gastritis  3. Gastric polyps  4. Peptic ulcer disease    MEDICATIONS ADMINISTERED: MAC. Further details per anesthesia note. INSTRUMENT:[id number]    PROCEDURE:  After obtaining informed consent, the patient was placed in the left lateral position and sedated. The endoscope was advanced under direct vision without difficulty. The esophagus, stomach (including retroflexed views) and duodenum were evaluated. The patient was taken to the recovery area in stable condition. FINDINGS:  ESOPHAGUS: Small hiatal hernia. Otherwise normal exam.  STOMACH: Hiatal hernia noted. Few small sessile polyps in body each <5 mm in size. Moderate gastritis of antrum and distal body of stomach. Proximal edge of large clean-based ulcer involving pylorus with surrounding edema. Cold-forceps biopsies for pathology. Otherwise normal exam.  DUODENUM: 15 mm clean-based ulcer in bulb. Stenosis and edema prevented gastroscope from traversing site of ulcer. 2nd portion of duodenum was not examined. Otherwise normal exam.    Estimated blood loss: 0-minimal   Specimens obtained during procedure:   1. Gastric biopsies    PLAN:  1. Follow-up pathology  2. Avoid NSAID use  3. Protonix 40 mg PO BID for 8 weeks then reduce to daily dosing  4. Will arrange repeat EGD in 8-10 weeks  5. Advance diet as tolerated to low residue diet    Will be available as need. Please call with questions.     Wu Guzman MD  [facility]

## 2018-12-19 NOTE — ROUTINE PROCESS
TRANSFER - OUT REPORT:    Verbal report given to Lopez Omar on Waynetta Frankel  being transferred to 8th Floor for ordered procedure       Report consisted of patients Situation, Background, Assessment and   Recommendations(SBAR). Information from the following report(s) SBAR and MAR was reviewed with the receiving nurse. Lines:   Peripheral IV 12/18/18 Right Wrist (Active)   Site Assessment Clean, dry, & intact 12/19/2018  2:05 PM   Phlebitis Assessment 0 12/19/2018  2:05 PM   Infiltration Assessment 0 12/19/2018  2:05 PM   Dressing Status Clean, dry, & intact 12/19/2018  2:05 PM   Dressing Type Transparent 12/19/2018  2:05 PM   Hub Color/Line Status Patent 12/19/2018  2:05 PM   Alcohol Cap Used No 12/19/2018  1:33 AM        Opportunity for questions and clarification was provided.       Patient transported with:   [employer]

## 2018-12-19 NOTE — PROGRESS NOTES
Patient received resting in bed. Abdominal pain improving per patient. Had been medicated for abdominal pain prior to start of shift. Tolerating ice chips at this time. Shift assessment completed, will monitor.

## 2018-12-19 NOTE — ANESTHESIA POSTPROCEDURE EVALUATION
Procedure(s):  ESOPHAGOGASTRODUODENOSCOPY (EGD) /BMI 26 ADMIT FROM ER  ESOPHAGOGASTRODUODENAL (EGD) BIOPSY. Anesthesia Post Evaluation      Multimodal analgesia: multimodal analgesia used between 6 hours prior to anesthesia start to PACU discharge  Patient location during evaluation: PACU  Patient participation: complete - patient participated  Level of consciousness: awake and awake and alert  Pain management: adequate  Airway patency: patent  Anesthetic complications: no  Cardiovascular status: acceptable  Respiratory status: acceptable  Hydration status: acceptable        Visit Vitals  /55   Pulse 81   Temp 36.7 °C (98 °F)   Resp 12   Ht 5' 3\" (1.6 m)   Wt 57.3 kg (126 lb 6.4 oz)   SpO2 97%   Breastfeeding?  No   BMI 22.39 kg/m²

## 2018-12-19 NOTE — PROGRESS NOTES
Problem: Interdisciplinary Rounds  Goal: Interdisciplinary Rounds  Outcome: Progressing Towards Goal  Interdisciplinary team rounds were held 12/19/2018 with the following team members:Care Management, Nursing, Physical Therapy, Physician and Clinical Coordinator and the patient. Plan of care discussed. See clinical pathway and/or care plan for interventions and desired outcomes.

## 2018-12-19 NOTE — PROGRESS NOTES
Epigastric pain controlled with morphine PO and IV. EGD today showed gastritis, ulcer, hiatal hernia. Potassium repleted orally. Tolerating clear liquid diet well. NS @150/hr continues.

## 2018-12-19 NOTE — PHYSICIAN ADVISORY
Letter of Determination: Upgrade from Observation to Inpatient Status    This patient was originally hospitalized as Outpatient Status with Observation Services on 12/18/2018 for gastrointestinal bleed. This patient now meets for Inpatient Admission based on medical necessity. The patient's stay was medically necessary based on continued drop in hemoglobin from 14.9 mg/dl to 9.6 mg/dl. It is our recommendation that this patient's hospitalization status should be upgraded from OBSERVATION to INPATIENT status.      The final decision regarding the patient's hospitalization status depends on the attending physician's judgement.     Kathe Burgess MD, MARGARET,   Physician [place].

## 2018-12-19 NOTE — INTERVAL H&P NOTE
H&P Update:  Rosemarie Pedersen was seen and examined. History and physical has been reviewed. The patient has been examined.  There have been no significant clinical changes since the completion of the originally dated History and Physical.    Signed By: Nikita Ley MD     December 19, 2018 1:44 PM

## 2018-12-20 PROBLEM — K27.4 PEPTIC ULCER HEMORRHAGE: Status: ACTIVE | Noted: 2018-12-20

## 2018-12-20 LAB
ANION GAP SERPL CALC-SCNC: 5 MMOL/L (ref 7–16)
BUN SERPL-MCNC: 11 MG/DL (ref 6–23)
CALCIUM SERPL-MCNC: 7.7 MG/DL (ref 8.3–10.4)
CHLORIDE SERPL-SCNC: 114 MMOL/L (ref 98–107)
CO2 SERPL-SCNC: 23 MMOL/L (ref 21–32)
CREAT SERPL-MCNC: 0.37 MG/DL (ref 0.6–1)
GLUCOSE SERPL-MCNC: 81 MG/DL (ref 65–100)
HCT VFR BLD AUTO: 28.1 % (ref 35.8–46.3)
HCT VFR BLD AUTO: 29 % (ref 35.8–46.3)
HCT VFR BLD AUTO: 29.9 % (ref 35.8–46.3)
HGB BLD-MCNC: 8.7 G/DL (ref 11.7–15.4)
HGB BLD-MCNC: 9.1 G/DL (ref 11.7–15.4)
HGB BLD-MCNC: 9.3 G/DL (ref 11.7–15.4)
POTASSIUM SERPL-SCNC: 4.4 MMOL/L (ref 3.5–5.1)
SODIUM SERPL-SCNC: 142 MMOL/L (ref 136–145)

## 2018-12-20 PROCEDURE — 74011250636 HC RX REV CODE- 250/636: Performed by: INTERNAL MEDICINE

## 2018-12-20 PROCEDURE — 65270000029 HC RM PRIVATE

## 2018-12-20 PROCEDURE — 36415 COLL VENOUS BLD VENIPUNCTURE: CPT

## 2018-12-20 PROCEDURE — 85014 HEMATOCRIT: CPT

## 2018-12-20 PROCEDURE — 74011250637 HC RX REV CODE- 250/637: Performed by: INTERNAL MEDICINE

## 2018-12-20 PROCEDURE — 80048 BASIC METABOLIC PNL TOTAL CA: CPT

## 2018-12-20 PROCEDURE — 77030020263 HC SOL INJ SOD CL0.9% LFCR 1000ML

## 2018-12-20 PROCEDURE — 99218 HC RM OBSERVATION: CPT

## 2018-12-20 PROCEDURE — C9113 INJ PANTOPRAZOLE SODIUM, VIA: HCPCS | Performed by: INTERNAL MEDICINE

## 2018-12-20 RX ORDER — PANTOPRAZOLE SODIUM 40 MG/1
40 TABLET, DELAYED RELEASE ORAL
Status: DISCONTINUED | OUTPATIENT
Start: 2018-12-20 | End: 2018-12-21 | Stop reason: HOSPADM

## 2018-12-20 RX ADMIN — Medication 5 ML: at 22:00

## 2018-12-20 RX ADMIN — ONDANSETRON 4 MG: 2 INJECTION INTRAMUSCULAR; INTRAVENOUS at 16:25

## 2018-12-20 RX ADMIN — MORPHINE SULFATE 15 MG: 15 TABLET ORAL at 06:51

## 2018-12-20 RX ADMIN — SODIUM CHLORIDE 150 ML/HR: 900 INJECTION, SOLUTION INTRAVENOUS at 02:42

## 2018-12-20 RX ADMIN — MORPHINE SULFATE 15 MG: 15 TABLET ORAL at 12:12

## 2018-12-20 RX ADMIN — ONDANSETRON 4 MG: 2 INJECTION INTRAMUSCULAR; INTRAVENOUS at 22:00

## 2018-12-20 RX ADMIN — PANTOPRAZOLE SODIUM 40 MG: 40 INJECTION, POWDER, FOR SOLUTION INTRAVENOUS at 09:26

## 2018-12-20 RX ADMIN — ONDANSETRON 4 MG: 2 INJECTION INTRAMUSCULAR; INTRAVENOUS at 00:38

## 2018-12-20 RX ADMIN — ONDANSETRON 4 MG: 2 INJECTION INTRAMUSCULAR; INTRAVENOUS at 06:50

## 2018-12-20 RX ADMIN — ONDANSETRON 4 MG: 2 INJECTION INTRAMUSCULAR; INTRAVENOUS at 12:13

## 2018-12-20 RX ADMIN — MORPHINE SULFATE 15 MG: 15 TABLET ORAL at 18:45

## 2018-12-20 RX ADMIN — Medication 10 ML: at 16:25

## 2018-12-20 RX ADMIN — PANTOPRAZOLE SODIUM 40 MG: 40 TABLET, DELAYED RELEASE ORAL at 16:25

## 2018-12-20 RX ADMIN — MORPHINE SULFATE 5 MG: 10 INJECTION INTRAVENOUS at 00:39

## 2018-12-20 NOTE — PROGRESS NOTES
Patient received sitting up in bed, medicated for c/o epigastric pain (9/10) and nausea with msir 15mg po and zofran 4mg iv per MD orders. Shift assessment completed, will monitor.

## 2018-12-20 NOTE — PROGRESS NOTES
Pt complaining of pain and Nausea gave PRN Morphine and Zofran primary Nurse aware and will continue to monitor pt.

## 2018-12-20 NOTE — PROGRESS NOTES
Somnolent, but arousable to voice. Hypotensive 85/43, likely secondary to morphine administration overnight for epigastric pain. [email or web address] continues.

## 2018-12-20 NOTE — PROGRESS NOTES
Hospitalist Progress Note    Patient: Minal Rodas MRN: [id number]  SSN: [id number]    YOB: 1965  Age: 46 y.o. Sex: female      Admit Date: 12/18/2018    LOS: 0 days     Subjective:     From H&P: \"47 year old CF with a PMH of GERD, OA, chronic back pain, and kidney stones presented to the ER with 12 hours of black tarry stools and vomiting dark material with associated upper abdominal pain. She states that she started \"not feeling right\" 4 days prior to admission and was just lying in bed for 3 days. The evening prior to admission she started having abdominal pain with cramping. She then went to bed and woke up at 3am with nausea and started vomiting dark material, followed by black stools. She continued to vomiting and have epigastric pain so she came to the ER. She denies NSAID use. In the ER she was found to be tachycardic, have an elevated BUN, and abdominal pain. \"    12/19 - She states that her epigastric pain and nausea have improved. Denies melena since admission. Denies CP/SOB. 12/20 - Patient had an episode of vomiting after lunch today. She states that her abdominal pain is worse afterwards. She passed a lot of black stool and clots last evening. Review of systems negative except stated above. Objective:     Visit Vitals  /55   Pulse 65   Temp 98.3 °F (36.8 °C)   Resp 18   Ht 5' 3\" (1.6 m)   Wt 60.9 kg (134 lb 3.2 oz)   SpO2 92%   Breastfeeding? No   BMI 23.77 kg/m²      Oxygen Therapy  O2 Sat (%): 92 % (12/20/18 0706)  Pulse via Oximetry: 70 beats per minute (12/19/18 1422)  O2 Device: Room air (12/20/18 0926)  O2 Flow Rate (L/min): 2 l/min (12/19/18 1402)      Intake and Output:     Intake/Output Summary (Last 24 hours) at 12/20/2018 1326  Last data filed at 12/20/2018 0706  Gross per 24 hour   Intake 460 ml   Output 200 ml   Net 260 ml         Physical Exam:   GENERAL: alert, cooperative, no distress, appears stated age  EYE: conjunctivae/corneas clear. PERRL.  THROAT & NECK: normal and no erythema or exudates noted. LUNG: clear to auscultation bilaterally  HEART: regular rate and rhythm, S1S2, no murmur, no JVD  ABDOMEN: soft, mild epigastric tender, non-distended. Bowel sounds normal.   EXTREMITIES:  No edema, 2+ pedal/radial pulses bilaterally  SKIN: no rash or abnormalities  NEUROLOGIC: A&Ox3. Cranial nerves 2-12 grossly intact. Lab/Data Review:  Recent Results (from the past 24 hour(s))   HGB & HCT    Collection Time: 12/19/18  5:46 PM   Result Value Ref Range    HGB 9.8 (L) 11.7 - 15.4 g/dL    HCT 31.7 (L) 35.8 - 89.7 %   METABOLIC PANEL, BASIC    Collection Time: 12/20/18  5:15 AM   Result Value Ref Range    Sodium 142 136 - 145 mmol/L    Potassium 4.4 3.5 - 5.1 mmol/L    Chloride 114 (H) 98 - 107 mmol/L    CO2 23 21 - 32 mmol/L    Anion gap 5 (L) 7 - 16 mmol/L    Glucose 81 65 - 100 mg/dL    BUN 11 6 - 23 MG/DL    Creatinine 0.37 (L) 0.6 - 1.0 MG/DL    GFR est AA >60 >60 ml/min/1.73m2    GFR est non-AA >60 >60 ml/min/1.73m2    Calcium 7.7 (L) 8.3 - 10.4 MG/DL   HGB & HCT    Collection Time: 12/20/18  5:15 AM   Result Value Ref Range    HGB 8.7 (L) 11.7 - 15.4 g/dL    HCT 28.1 (L) 35.8 - 46.3 %   HGB & HCT    Collection Time: 12/20/18 11:51 AM   Result Value Ref Range    HGB 9.3 (L) 11.7 - 15.4 g/dL    HCT 29.9 (L) 35.8 - 46.3 %       Imaging:  Xr Abd Acute W 1 V Chest    Result Date: 12/18/2018  IMPRESSION:  No acute findings in the chest or abdomen. No results found for this visit on 12/18/18. Cultures:   All Micro Results     None          Assessment/Plan:     Principal Problem:    Acute Blood Loss Anemia (12/19/2018)  - Hgb dropped but stablizing 14.9 --> 11.5 --> 9.9 --> 8.7 --> 9.3  - Passed lots of black stool overnight  - Trend H/H     Active Problems:    Melena (12/18/2018)  - EGD showed ulcers and gastritis  - Change Pantoprazole 40mg IV BID to PO  - Trend Hgb Q8H  - Appreciate GI's input and assistance      Acute prerenal azotemia (12/18/2018)  - Resolved  - Likely due to UGIB + dehydration  - Continue normal saline @ 150 ml/hr  - Check BMP daily       Acute epigastric pain (12/18/2018)  - Due to PUD  - Lipase normal  - Continue Pantoprazole BID  - PRN Morphine  - Appreciate GI's input and assistance       Nausea and vomiting (12/18/2018)  - Likely due to gastritis vs. PUD vs viral  - PRN Zofran  - Monitor for changes       SIRS of non-infectious origin w/o acute organ dysfunction ([address] (12/18/2018)  - Resolved  - Likely due to UGIB  - No s/s of active infection  - Continue normal saline  - Check UA  - Lactic acid normal       Leukocytosis (12/18/2018)  - Likely reactive  - Repeat CBC this AM  - No abx at this point  - Check UA  - Check CBC daily       Sinus tachycardia (12/18/2018)  - Resolved  - Likely due to #1  - Continue IVFs       GERD (gastroesophageal reflux disease) (12/18/2018)  - Pantoprazole IV BID    Dispo - Plan discharge in AM    DIET REGULAR  DIET ONE TIME MESSAGE    DVT Prophylaxis: SCDs      Signed By: Tierra Carson DO     December 20, 2018

## 2018-12-20 NOTE — PROGRESS NOTES
Tolerating regular diet poorly. Reports pain/nausea/emesis/reflux. Morphine PO, ondansetron IV, protonix PO. HGB stable today, from 8.7 to 9.3.

## 2018-12-21 VITALS
HEIGHT: 63 IN | DIASTOLIC BLOOD PRESSURE: 52 MMHG | BODY MASS INDEX: 23.9 KG/M2 | RESPIRATION RATE: 18 BRPM | WEIGHT: 134.9 LBS | SYSTOLIC BLOOD PRESSURE: 124 MMHG | OXYGEN SATURATION: 91 % | HEART RATE: 64 BPM | TEMPERATURE: 98 F

## 2018-12-21 LAB
ANION GAP SERPL CALC-SCNC: 6 MMOL/L (ref 7–16)
BUN SERPL-MCNC: 5 MG/DL (ref 6–23)
CALCIUM SERPL-MCNC: 7.8 MG/DL (ref 8.3–10.4)
CHLORIDE SERPL-SCNC: 114 MMOL/L (ref 98–107)
CO2 SERPL-SCNC: 25 MMOL/L (ref 21–32)
CREAT SERPL-MCNC: 0.35 MG/DL (ref 0.6–1)
GLUCOSE SERPL-MCNC: 99 MG/DL (ref 65–100)
HCT VFR BLD AUTO: 27.7 % (ref 35.8–46.3)
HGB BLD-MCNC: 8.6 G/DL (ref 11.7–15.4)
POTASSIUM SERPL-SCNC: 3.7 MMOL/L (ref 3.5–5.1)
SODIUM SERPL-SCNC: 145 MMOL/L (ref 136–145)

## 2018-12-21 PROCEDURE — 80048 BASIC METABOLIC PNL TOTAL CA: CPT

## 2018-12-21 PROCEDURE — 74011250637 HC RX REV CODE- 250/637: Performed by: INTERNAL MEDICINE

## 2018-12-21 PROCEDURE — 77030020263 HC SOL INJ SOD CL0.9% LFCR 1000ML

## 2018-12-21 PROCEDURE — 36415 COLL VENOUS BLD VENIPUNCTURE: CPT

## 2018-12-21 PROCEDURE — 74011250636 HC RX REV CODE- 250/636: Performed by: INTERNAL MEDICINE

## 2018-12-21 PROCEDURE — 85014 HEMATOCRIT: CPT

## 2018-12-21 RX ORDER — HYDROCODONE BITARTRATE AND ACETAMINOPHEN 10; 325 MG/1; MG/1
1 TABLET ORAL
Qty: 30 TAB | Refills: 0 | Status: SHIPPED | OUTPATIENT
Start: 2018-12-21

## 2018-12-21 RX ORDER — PANTOPRAZOLE SODIUM 40 MG/1
40 TABLET, DELAYED RELEASE ORAL
Qty: 60 TAB | Refills: 2 | Status: ON HOLD | OUTPATIENT
Start: 2018-12-21 | End: 2019-04-01 | Stop reason: SDUPTHER

## 2018-12-21 RX ORDER — SUCRALFATE 1 G/1
1 TABLET ORAL
Status: DISCONTINUED | OUTPATIENT
Start: 2018-12-21 | End: 2018-12-21 | Stop reason: HOSPADM

## 2018-12-21 RX ORDER — SUCRALFATE 1 G/1
1 TABLET ORAL
Qty: 28 TAB | Refills: 0 | Status: SHIPPED | OUTPATIENT
Start: 2018-12-21 | End: 2018-12-28

## 2018-12-21 RX ADMIN — MORPHINE SULFATE 15 MG: 15 TABLET ORAL at 01:00

## 2018-12-21 RX ADMIN — ONDANSETRON 4 MG: 2 INJECTION INTRAMUSCULAR; INTRAVENOUS at 08:32

## 2018-12-21 RX ADMIN — Medication 5 ML: at 06:03

## 2018-12-21 RX ADMIN — PANTOPRAZOLE SODIUM 40 MG: 40 TABLET, DELAYED RELEASE ORAL at 06:04

## 2018-12-21 RX ADMIN — SODIUM CHLORIDE 150 ML/HR: 900 INJECTION, SOLUTION INTRAVENOUS at 01:06

## 2018-12-21 RX ADMIN — MORPHINE SULFATE 15 MG: 15 TABLET ORAL at 08:34

## 2018-12-21 NOTE — DISCHARGE INSTRUCTIONS
DISCHARGE SUMMARY from Nurse    PATIENT INSTRUCTIONS:    After general anesthesia or intravenous sedation, for 24 hours or while taking prescription Narcotics:  · Limit your activities  · Do not drive and operate hazardous machinery  · Do not make important personal or business decisions  · Do  not drink alcoholic beverages  · If you have not urinated within 8 hours after discharge, please contact your surgeon on call. Report the following to your surgeon:  · Excessive pain, swelling, redness or odor of or around the surgical area  · Temperature over 100.5  · Nausea and vomiting lasting longer than 4 hours or if unable to take medications  · Any signs of decreased circulation or nerve impairment to extremity: change in color, persistent  numbness, tingling, coldness or increase pain  · Any questions    What to do at Home:  Recommended Activity: Activity as tolerated    Diet: Low Residue Diet        If you experience any of the following symptoms temp > 101.5, unrelieved pain, nausea or vomiting, dark tarry stools, please follow up with MD.    *  Please give a list of your current medications to your Primary Care Provider. *  Please update this list whenever your medications are discontinued, doses are      changed, or new medications (including over-the-counter products) are added. *  Please carry medication information at all times in case of emergency situations. These are general instructions for a healthy lifestyle:    No smoking/ No tobacco products/ Avoid exposure to second hand smoke  Surgeon General's Warning:  Quitting smoking now greatly reduces serious risk to your health.     Obesity, smoking, and sedentary lifestyle greatly increases your risk for illness    A healthy diet, regular physical exercise & weight monitoring are important for maintaining a healthy lifestyle    You may be retaining fluid if you have a history of heart failure or if you experience any of the following symptoms: Weight gain of 3 pounds or more overnight or 5 pounds in a week, increased swelling in our hands or feet or shortness of breath while lying flat in bed. Please call your doctor as soon as you notice any of these symptoms; do not wait until your next office visit. Recognize signs and symptoms of STROKE:    F-face looks uneven    A-arms unable to move or move unevenly    S-speech slurred or non-existent    T-time-call 911 as soon as signs and symptoms begin-DO NOT go       Back to bed or wait to see if you get better-TIME IS BRAIN. Warning Signs of HEART ATTACK     Call 911 if you have these symptoms:   Chest discomfort. Most heart attacks involve discomfort in the center of the chest that lasts more than a few minutes, or that goes away and comes back. It can feel like uncomfortable pressure, squeezing, fullness, or pain.  Discomfort in other areas of the upper body. Symptoms can include pain or discomfort in one or both arms, the back, neck, jaw, or stomach.  Shortness of breath with or without chest discomfort.  Other signs may include breaking out in a cold sweat, nausea, or lightheadedness. Don't wait more than five minutes to call 911 - MINUTES MATTER! Fast action can save your life. Calling 911 is almost always the fastest way to get lifesaving treatment. Emergency Medical Services staff can begin treatment when they arrive -- up to an hour sooner than if someone gets to the hospital by car. The discharge information has been reviewed with the patient. The patient verbalized understanding. Discharge medications reviewed with the patient and appropriate educational materials and side effects teaching were provided. Peptic Ulcer Disease: Care Instructions  Your Care Instructions    Peptic ulcers are sores on the inside of the stomach or the small intestine (such as a duodenal ulcer).  They are most often caused by an infection with bacteria or from use of nonsteroidal anti-inflammatory drugs (NSAIDs). NSAIDs include aspirin, ibuprofen (Advil), and naproxen (Aleve). Your doctor may have prescribed medicine to reduce stomach acid. You also may need to take antibiotics if your peptic ulcers are caused by an infection. You can help yourself heal and keep ulcers from coming back by making some changes in your lifestyle. Quit smoking. Limit alcohol. Follow-up care is a key part of your treatment and safety. Be sure to make and go to all appointments, and call your doctor if you are having problems. It's also a good idea to know your test results and keep a list of the medicines you take. How can you care for yourself at home? · Be safe with medicines. Take your medicines exactly as prescribed. Call your doctor if you think you are having a problem with your medicine. · Do not take aspirin or other NSAIDs such as ibuprofen (Advil or Motrin) or naproxen (Aleve). Ask your doctor what you can take for pain. · Do not smoke. Smoking can make ulcers worse. If you need help quitting, talk to your doctor about stop-smoking programs and medicines. These can increase your chances of quitting for good. · Drink in moderation or avoid drinking alcohol. · Eat a balanced diet of small, frequent meals. See a dietitian if you need help planning your meals. When should you call for help? [id number] anytime you think you may need emergency care. For example, call if:    · You vomit blood or what looks like coffee grounds.     · You pass maroon or very bloody stools.    Call your doctor now or seek immediate medical care if:    · You have new or worse belly pain.     · Your stools are black and look like tar, or they have streaks of blood.     · You vomit.    Watch closely for changes in your health, and be sure to contact your doctor if:    · You do not get better as expected. Where can you learn more? Go to http://osito-ashley.info/.   Enter E431 in the search box to learn more about \"Peptic Ulcer Disease: Care Instructions. \"  Current as of: July 24, 2017  Content Version: 11.8  © 2006-2018 [employer]. Care instructions adapted under license by [employer] (which disclaims liability or warranty for this information). If you have questions about a medical condition or this instruction, always ask your healthcare professional. [address] any warranty or liability for your use of this information.       ___________________________________________________________________________________________________________________________________

## 2018-12-21 NOTE — PROGRESS NOTES
End of shift report given to [employer]. Patient sitting on side of bed, changing into bra and shift. No complaints of nausea or vomiting at present, no loose black stools. Medicated  For pain x 1 with Morphine PO and nausea x 1 during night shift. Safety maintained, call bell in reach.

## 2018-12-21 NOTE — PROGRESS NOTES
Discharge instructions, follow up information, medication list, prescriptions, and prescription voucher provided and explained to the pt. IV removed from right wrist, no remote telemetry on. Opportunity for questions provided. Instructed to call once ready to leave.

## 2018-12-21 NOTE — PROGRESS NOTES
Patient was given a med voucher for discharged prescriptions. She was given information on the [facility] for follow up. CM remains available.

## 2018-12-21 NOTE — PROGRESS NOTES
Assumed care of patient, report received from [facility]. SBAR reviewed. Patient resting in bed on left side, HOB elevated. IV fluids infusing at 150 ml/hr. Denies needs at present, call light in reach, visitor at bedside.

## 2018-12-21 NOTE — PROGRESS NOTES
Received bedside shift report from STACY Terrazas. Patient in bed, in its low and locked position with call bell within reach. Patient alert and oriented x 4 with respirations regular and non-labored.

## 2018-12-21 NOTE — DISCHARGE SUMMARY
Hospitalist Discharge Summary     Patient ID:  Nasima Portillo  [id number]  14 y.o.  1965  Admit date: 12/18/2018 12:08 PM  Discharge date and time: 12/21/2018  Attending: Liss Flores DO  PCP:  None  Treatment Team: Attending Provider: Liss Flores DO; Care Manager: Rachele Vogt Principal Diagnosis Melena    Hospital Problems as of 12/21/2018 Date Reviewed: 11/21/2017          Codes Class Noted - Resolved POA    * (Principal) Melena ICD-10-CM: K92.1  ICD-9-CM: 578.1  12/18/2018 - Present Yes        Acute prerenal azotemia ICD-10-CM: R79.89  ICD-9-CM: 790.6  12/18/2018 - Present Yes        Acute epigastric pain ICD-10-CM: R10.13  ICD-9-CM: 789.06, 338.19  12/18/2018 - Present Yes        Nausea and vomiting ICD-10-CM: R11.2  ICD-9-CM: 787.01  12/18/2018 - Present Yes        Leukocytosis ICD-10-CM: D72.829  ICD-9-CM: 288.60  12/18/2018 - Present Yes        Sinus tachycardia ICD-10-CM: R00.0  ICD-9-CM: 427.89  12/18/2018 - Present Yes        SIRS of non-infectious origin w/o acute organ dysfunction [facility]) ICD-10-CM: R65.10  ICD-9-CM: 995.93  12/18/2018 - Present Yes        GERD (gastroesophageal reflux disease) ICD-10-CM: K21.9  ICD-9-CM: 530.81  12/18/2018 - Present Yes        Peptic ulcer hemorrhage ICD-10-CM: K27.4  ICD-9-CM: 533.40  12/20/2018 - Present Unknown              From H&P: \"47 year old CF with a PMH of GERD, OA, chronic back pain, and kidney stones presented to the ER with 12 hours of black tarry stools and vomiting dark material with associated upper abdominal pain. She states that she started \"not feeling right\" 4 days prior to admission and was just lying in bed for 3 days. The evening prior to admission she started having abdominal pain with cramping. She then went to bed and woke up at 3am with nausea and started vomiting dark material, followed by black stools. She continued to vomiting and have epigastric pain so she came to the ER. She denies NSAID use.  In the ER she was found to be tachycardic, have an elevated BUN, and abdominal pain. \"    Hospital Course:  She was admitted to the floor and started on Pantoprazole IV BID. Her Hgb dropped to 9.9 over night. GI evaluated her and took her for an EGD which showed gastritis and a gastric ulcer, but no acute bleeding. Her Hgb remained stable. She continued to have some epigastric pain and nausea. She was discharged home in stable condition. Significant Diagnostic Studies:    Labs: Results:       Chemistry Recent Labs     12/21/18  0517 12/20/18  0515 12/19/18  0421 12/18/18  1202   GLU 99 81 71 111*    142 142 136   K 3.7 4.4 3.3* 3.8   * 114* 110* 99   CO2 25 23 22 24   BUN 5* 11 35* 45*   CREA 0.35* 0.37* 0.57* 1.09*   CA 7.8* 7.7* 7.7* 9.7   AGAP 6* 5* 10 13   AP  --   --   --  149*   TP  --   --   --  8.3*   ALB  --   --   --  4.0   GLOB  --   --   --  4.3*   AGRAT  --   --   --  0.9*      CBC w/Diff Recent Labs     12/21/18  0517 12/20/18  1841 12/20/18  1151  12/19/18  1201  12/18/18  1202   WBC  --   --   --   --  7.6  --  13.2*   RBC  --   --   --   --  2.95*  --  4.57   HGB 8.6* 9.1* 9.3*   < > 9.6*   < > 14.9   HCT 27.7* 29.0* 29.9*   < > 31.1*   < > 46.0   PLT  --   --   --   --  205  --  395   GRANS  --   --   --   --   --   --  63   LYMPH  --   --   --   --   --   --  30   EOS  --   --   --   --   --   --  0*    < > = values in this interval not displayed. Cardiac Enzymes No results for input(s): CPK, CKND1, SERINA in the last 72 hours. No lab exists for component: CKRMB, TROIP   Coagulation Recent Labs     12/18/18  1540   PTP 14.0   INR 1.1       Lipid Panel No results found for: CHOL, CHOLPOCT, CHOLX, CHLST, CHOLV, 219478, HDL, LDL, LDLC, DLDLP, 800454, VLDLC, VLDL, TGLX, TRIGL, TRIGP, TGLPOCT, CHHD, CHHDX   BNP No results for input(s): BNPP in the last 72 hours.    Liver Enzymes Recent Labs     12/18/18  1202   TP 8.3*   ALB 4.0   *   SGOT 8*      Thyroid Studies No results found for: T4, T3U, TSH, TSHEXT         Imaging:  Xr Abd Acute W 1 V Chest    Result Date: 12/18/2018  IMPRESSION:  No acute findings in the chest or abdomen. Microbiology/Cultures: All Micro Results     None          Discharge Exam:  Visit Vitals  /52   Pulse 64   Temp 98 °F (36.7 °C)   Resp 18   Ht 5' 3\" (1.6 m)   Wt 61.2 kg (134 lb 14.4 oz)   SpO2 91%   Breastfeeding? No   BMI 23.90 kg/m²     General appearance: alert, cooperative, no distress, appears stated age  Lungs: clear to auscultation bilaterally  Heart: regular rate and rhythm, S1, S2 normal, no murmur, click, rub or gallop  Abdomen: soft, epigastric tender. Bowel sounds normal. No masses,  no organomegaly  Extremities: no cyanosis or edema  Neurologic: Grossly normal    Disposition: home  Discharge Condition: stable  Patient Instructions:   Current Discharge Medication List      START taking these medications    Details   pantoprazole (PROTONIX) 40 mg tablet Take 1 Tab by mouth Before breakfast and dinner. Qty: 60 Tab, Refills: 2      sucralfate (CARAFATE) 1 gram tablet Take 1 Tab by mouth Before breakfast, lunch, dinner and at bedtime for 7 days. Qty: 28 Tab, Refills: 0         CONTINUE these medications which have CHANGED    Details   HYDROcodone-acetaminophen (NORCO)  mg tablet Take 1 Tab by mouth every eight (8) hours as needed for Pain. Max Daily Amount: 3 Tabs. Qty: 30 Tab, Refills: 0    Associated Diagnoses: Right upper quadrant abdominal pain         CONTINUE these medications which have NOT CHANGED    Details   ondansetron (ZOFRAN ODT) 4 mg disintegrating tablet Take 1 Tab by mouth every eight (8) hours as needed for Nausea.   Qty: 6 Tab, Refills: 0         STOP taking these medications       omeprazole (PRILOSEC) 20 mg capsule Comments:   Reason for Stopping:               Activity: Activity as tolerated  Diet: Low Residue Diet  Wound Care: None needed    Follow-up  ·   Family doctor in one week  · [facility] in 6-8 weeks  Time spent to discharge patient 35 minutes  Signed:  Alfonso Parsons DO  12/21/2018  8:38 AM

## 2019-03-27 ENCOUNTER — HOSPITAL ENCOUNTER (INPATIENT)
Age: 54
LOS: 3 days | Discharge: HOME | DRG: 378 | End: 2019-04-01
Attending: EMERGENCY MEDICINE | Admitting: INTERNAL MEDICINE
Payer: SUBSIDIZED

## 2019-03-27 DIAGNOSIS — N17.9 AKI (ACUTE KIDNEY INJURY) (HCC): Primary | ICD-10-CM

## 2019-03-27 DIAGNOSIS — R10.13 ABDOMINAL PAIN, EPIGASTRIC: ICD-10-CM

## 2019-03-27 DIAGNOSIS — I95.9 HYPOTENSION, UNSPECIFIED HYPOTENSION TYPE: ICD-10-CM

## 2019-03-27 PROCEDURE — 99285 EMERGENCY DEPT VISIT HI MDM: CPT | Performed by: EMERGENCY MEDICINE

## 2019-03-28 ENCOUNTER — APPOINTMENT (OUTPATIENT)
Dept: CT IMAGING | Age: 54
DRG: 378 | End: 2019-03-28
Attending: EMERGENCY MEDICINE
Payer: SUBSIDIZED

## 2019-03-28 ENCOUNTER — ANESTHESIA EVENT (OUTPATIENT)
Dept: ENDOSCOPY | Age: 54
DRG: 378 | End: 2019-03-28
Payer: SUBSIDIZED

## 2019-03-28 ENCOUNTER — ANESTHESIA (OUTPATIENT)
Dept: ENDOSCOPY | Age: 54
DRG: 378 | End: 2019-03-28
Payer: SUBSIDIZED

## 2019-03-28 PROBLEM — R11.2 NAUSEA AND VOMITING: Status: RESOLVED | Noted: 2018-12-18 | Resolved: 2019-03-28

## 2019-03-28 PROBLEM — R10.11 RIGHT UPPER QUADRANT ABDOMINAL PAIN: Status: RESOLVED | Noted: 2017-10-17 | Resolved: 2019-03-28

## 2019-03-28 PROBLEM — K27.4 PEPTIC ULCER HEMORRHAGE: Status: RESOLVED | Noted: 2018-12-20 | Resolved: 2019-03-28

## 2019-03-28 PROBLEM — R00.0 SINUS TACHYCARDIA: Status: RESOLVED | Noted: 2018-12-18 | Resolved: 2019-03-28

## 2019-03-28 PROBLEM — R65.10 SIRS OF NON-INFECTIOUS ORIGIN W/O ACUTE ORGAN DYSFUNCTION (HCC): Status: RESOLVED | Noted: 2018-12-18 | Resolved: 2019-03-28

## 2019-03-28 PROBLEM — N19 ACUTE PRERENAL AZOTEMIA: Status: RESOLVED | Noted: 2018-12-18 | Resolved: 2019-03-28

## 2019-03-28 PROBLEM — K92.2 GI BLEED: Status: ACTIVE | Noted: 2019-03-28

## 2019-03-28 LAB
ALBUMIN SERPL-MCNC: 4.5 G/DL (ref 3.5–5)
ALBUMIN/GLOB SERPL: 1.1 {RATIO} (ref 1.2–3.5)
ALP SERPL-CCNC: 106 U/L (ref 50–136)
ALT SERPL-CCNC: 13 U/L (ref 12–65)
AMORPH CRY URNS QL MICRO: ABNORMAL
ANION GAP SERPL CALC-SCNC: 14 MMOL/L (ref 7–16)
APTT PPP: 30.8 SEC (ref 24.7–39.8)
AST SERPL-CCNC: 11 U/L (ref 15–37)
BACTERIA URNS QL MICRO: 0 /HPF
BASOPHILS # BLD: 0 K/UL (ref 0–0.2)
BASOPHILS # BLD: 0 K/UL (ref 0–0.2)
BASOPHILS # BLD: 0.1 K/UL (ref 0–0.2)
BASOPHILS NFR BLD: 1 % (ref 0–2)
BILIRUB SERPL-MCNC: 0.5 MG/DL (ref 0.2–1.1)
BUN SERPL-MCNC: 45 MG/DL (ref 6–23)
CALCIUM SERPL-MCNC: 10.7 MG/DL (ref 8.3–10.4)
CASTS URNS QL MICRO: ABNORMAL /LPF
CHLORIDE SERPL-SCNC: 90 MMOL/L (ref 98–107)
CO2 SERPL-SCNC: 30 MMOL/L (ref 21–32)
CREAT SERPL-MCNC: 1.08 MG/DL (ref 0.6–1)
CRYSTALS URNS QL MICRO: 0 /LPF
DIFFERENTIAL METHOD BLD: ABNORMAL
EOSINOPHIL # BLD: 0 K/UL (ref 0–0.8)
EOSINOPHIL NFR BLD: 0 % (ref 0.5–7.8)
EOSINOPHIL NFR BLD: 1 % (ref 0.5–7.8)
EOSINOPHIL NFR BLD: 1 % (ref 0.5–7.8)
EPI CELLS #/AREA URNS HPF: ABNORMAL /HPF
ERYTHROCYTE [DISTWIDTH] IN BLOOD BY AUTOMATED COUNT: 14.4 % (ref 11.9–14.6)
ERYTHROCYTE [DISTWIDTH] IN BLOOD BY AUTOMATED COUNT: 15.6 % (ref 11.9–14.6)
ERYTHROCYTE [DISTWIDTH] IN BLOOD BY AUTOMATED COUNT: 15.7 % (ref 11.9–14.6)
GLOBULIN SER CALC-MCNC: 4.1 G/DL (ref 2.3–3.5)
GLUCOSE SERPL-MCNC: 96 MG/DL (ref 65–100)
HCT VFR BLD AUTO: 34 % (ref 35.8–46.3)
HCT VFR BLD AUTO: 34.1 % (ref 35.8–46.3)
HCT VFR BLD AUTO: 34.7 % (ref 35.8–46.3)
HCT VFR BLD AUTO: 43.7 % (ref 35.8–46.3)
HGB BLD-MCNC: 10.6 G/DL (ref 11.7–15.4)
HGB BLD-MCNC: 10.7 G/DL (ref 11.7–15.4)
HGB BLD-MCNC: 11.1 G/DL (ref 11.7–15.4)
HGB BLD-MCNC: 11.1 G/DL (ref 11.7–15.4)
HGB BLD-MCNC: 14.1 G/DL (ref 11.7–15.4)
IMM GRANULOCYTES # BLD AUTO: 0 K/UL (ref 0–0.5)
IMM GRANULOCYTES NFR BLD AUTO: 0 % (ref 0–5)
INR PPP: 1
LIPASE SERPL-CCNC: 197 U/L (ref 73–393)
LYMPHOCYTES # BLD: 1.3 K/UL (ref 0.5–4.6)
LYMPHOCYTES # BLD: 1.3 K/UL (ref 0.5–4.6)
LYMPHOCYTES # BLD: 3.1 K/UL (ref 0.5–4.6)
LYMPHOCYTES NFR BLD: 20 % (ref 13–44)
LYMPHOCYTES NFR BLD: 23 % (ref 13–44)
LYMPHOCYTES NFR BLD: 28 % (ref 13–44)
MCH RBC QN AUTO: 29.1 PG (ref 26.1–32.9)
MCH RBC QN AUTO: 29.6 PG (ref 26.1–32.9)
MCH RBC QN AUTO: 29.7 PG (ref 26.1–32.9)
MCHC RBC AUTO-ENTMCNC: 32 G/DL (ref 31.4–35)
MCHC RBC AUTO-ENTMCNC: 32.3 G/DL (ref 31.4–35)
MCHC RBC AUTO-ENTMCNC: 32.6 G/DL (ref 31.4–35)
MCV RBC AUTO: 90.1 FL (ref 79.6–97.8)
MCV RBC AUTO: 90.9 FL (ref 79.6–97.8)
MCV RBC AUTO: 92.8 FL (ref 79.6–97.8)
MONOCYTES # BLD: 0 K/UL (ref 0.1–1.3)
MONOCYTES # BLD: 0.1 K/UL (ref 0.1–1.3)
MONOCYTES # BLD: 0.9 K/UL (ref 0.1–1.3)
MONOCYTES NFR BLD: 1 % (ref 4–12)
MONOCYTES NFR BLD: 1 % (ref 4–12)
MONOCYTES NFR BLD: 7 % (ref 4–12)
MUCOUS THREADS URNS QL MICRO: 0 /LPF
NEUTS SEG # BLD: 3.2 K/UL (ref 1.7–8.2)
NEUTS SEG # BLD: 5.2 K/UL (ref 1.7–8.2)
NEUTS SEG # BLD: 9.5 K/UL (ref 1.7–8.2)
NEUTS SEG NFR BLD: 69 % (ref 43–78)
NEUTS SEG NFR BLD: 70 % (ref 43–78)
NEUTS SEG NFR BLD: 79 % (ref 43–78)
NRBC # BLD: 0 K/UL (ref 0–0.2)
OTHER OBSERVATIONS,UCOM: ABNORMAL
PLATELET # BLD AUTO: 159 K/UL (ref 150–450)
PLATELET # BLD AUTO: 176 K/UL (ref 150–450)
PLATELET # BLD AUTO: 362 K/UL (ref 150–450)
PMV BLD AUTO: 9.4 FL (ref 9.4–12.3)
PMV BLD AUTO: 9.7 FL (ref 9.4–12.3)
PMV BLD AUTO: 9.7 FL (ref 9.4–12.3)
POTASSIUM SERPL-SCNC: 3.5 MMOL/L (ref 3.5–5.1)
PROT SERPL-MCNC: 8.6 G/DL (ref 6.3–8.2)
PROTHROMBIN TIME: 13.3 SEC (ref 11.7–14.5)
RBC # BLD AUTO: 3.74 M/UL (ref 4.05–5.2)
RBC # BLD AUTO: 3.75 M/UL (ref 4.05–5.2)
RBC # BLD AUTO: 4.85 M/UL (ref 4.05–5.2)
RBC #/AREA URNS HPF: ABNORMAL /HPF
SODIUM SERPL-SCNC: 134 MMOL/L (ref 136–145)
WBC # BLD AUTO: 13.7 K/UL (ref 4.3–11.1)
WBC # BLD AUTO: 4.6 K/UL (ref 4.3–11.1)
WBC # BLD AUTO: 6.6 K/UL (ref 4.3–11.1)
WBC URNS QL MICRO: ABNORMAL /HPF

## 2019-03-28 PROCEDURE — 74177 CT ABD & PELVIS W/CONTRAST: CPT

## 2019-03-28 PROCEDURE — 74011250636 HC RX REV CODE- 250/636

## 2019-03-28 PROCEDURE — 74011250636 HC RX REV CODE- 250/636: Performed by: EMERGENCY MEDICINE

## 2019-03-28 PROCEDURE — 74011636320 HC RX REV CODE- 636/320: Performed by: EMERGENCY MEDICINE

## 2019-03-28 PROCEDURE — 86923 COMPATIBILITY TEST ELECTRIC: CPT

## 2019-03-28 PROCEDURE — P9016 RBC LEUKOCYTES REDUCED: HCPCS

## 2019-03-28 PROCEDURE — 94762 N-INVAS EAR/PLS OXIMTRY CONT: CPT

## 2019-03-28 PROCEDURE — 81015 MICROSCOPIC EXAM OF URINE: CPT

## 2019-03-28 PROCEDURE — 96374 THER/PROPH/DIAG INJ IV PUSH: CPT | Performed by: EMERGENCY MEDICINE

## 2019-03-28 PROCEDURE — 74011000250 HC RX REV CODE- 250: Performed by: EMERGENCY MEDICINE

## 2019-03-28 PROCEDURE — 88305 TISSUE EXAM BY PATHOLOGIST: CPT

## 2019-03-28 PROCEDURE — 0DB68ZX EXCISION OF STOMACH, VIA NATURAL OR ARTIFICIAL OPENING ENDOSCOPIC, DIAGNOSTIC: ICD-10-PCS | Performed by: INTERNAL MEDICINE

## 2019-03-28 PROCEDURE — 85610 PROTHROMBIN TIME: CPT

## 2019-03-28 PROCEDURE — 96376 TX/PRO/DX INJ SAME DRUG ADON: CPT | Performed by: EMERGENCY MEDICINE

## 2019-03-28 PROCEDURE — 74011250636 HC RX REV CODE- 250/636: Performed by: FAMILY MEDICINE

## 2019-03-28 PROCEDURE — 74011250636 HC RX REV CODE- 250/636: Performed by: ANESTHESIOLOGY

## 2019-03-28 PROCEDURE — 36430 TRANSFUSION BLD/BLD COMPNT: CPT

## 2019-03-28 PROCEDURE — C9113 INJ PANTOPRAZOLE SODIUM, VIA: HCPCS | Performed by: EMERGENCY MEDICINE

## 2019-03-28 PROCEDURE — 36415 COLL VENOUS BLD VENIPUNCTURE: CPT

## 2019-03-28 PROCEDURE — 76040000025: Performed by: INTERNAL MEDICINE

## 2019-03-28 PROCEDURE — C9113 INJ PANTOPRAZOLE SODIUM, VIA: HCPCS | Performed by: FAMILY MEDICINE

## 2019-03-28 PROCEDURE — 86900 BLOOD TYPING SEROLOGIC ABO: CPT

## 2019-03-28 PROCEDURE — 94760 N-INVAS EAR/PLS OXIMETRY 1: CPT

## 2019-03-28 PROCEDURE — 96361 HYDRATE IV INFUSION ADD-ON: CPT | Performed by: EMERGENCY MEDICINE

## 2019-03-28 PROCEDURE — 99218 HC RM OBSERVATION: CPT

## 2019-03-28 PROCEDURE — 77030020263 HC SOL INJ SOD CL0.9% LFCR 1000ML

## 2019-03-28 PROCEDURE — 96375 TX/PRO/DX INJ NEW DRUG ADDON: CPT | Performed by: EMERGENCY MEDICINE

## 2019-03-28 PROCEDURE — 83690 ASSAY OF LIPASE: CPT

## 2019-03-28 PROCEDURE — 74011000258 HC RX REV CODE- 258: Performed by: EMERGENCY MEDICINE

## 2019-03-28 PROCEDURE — 77010033678 HC OXYGEN DAILY

## 2019-03-28 PROCEDURE — 77030037040 HC FCPS BIOP ENDOSC PED RAD JAW DISP BSC -B: Performed by: INTERNAL MEDICINE

## 2019-03-28 PROCEDURE — 74011000250 HC RX REV CODE- 250: Performed by: FAMILY MEDICINE

## 2019-03-28 PROCEDURE — 85018 HEMOGLOBIN: CPT

## 2019-03-28 PROCEDURE — 85730 THROMBOPLASTIN TIME PARTIAL: CPT

## 2019-03-28 PROCEDURE — 30233N1 TRANSFUSION OF NONAUTOLOGOUS RED BLOOD CELLS INTO PERIPHERAL VEIN, PERCUTANEOUS APPROACH: ICD-10-PCS | Performed by: HOSPITALIST

## 2019-03-28 PROCEDURE — 74011250637 HC RX REV CODE- 250/637: Performed by: INTERNAL MEDICINE

## 2019-03-28 PROCEDURE — 88312 SPECIAL STAINS GROUP 1: CPT

## 2019-03-28 PROCEDURE — 80053 COMPREHEN METABOLIC PANEL: CPT

## 2019-03-28 PROCEDURE — 76060000032 HC ANESTHESIA 0.5 TO 1 HR: Performed by: INTERNAL MEDICINE

## 2019-03-28 PROCEDURE — 85025 COMPLETE CBC W/AUTO DIFF WBC: CPT

## 2019-03-28 RX ORDER — PROPOFOL 10 MG/ML
INJECTION, EMULSION INTRAVENOUS AS NEEDED
Status: DISCONTINUED | OUTPATIENT
Start: 2019-03-28 | End: 2019-03-28 | Stop reason: HOSPADM

## 2019-03-28 RX ORDER — SODIUM CHLORIDE 0.9 % (FLUSH) 0.9 %
5-40 SYRINGE (ML) INJECTION AS NEEDED
Status: DISCONTINUED | OUTPATIENT
Start: 2019-03-28 | End: 2019-04-01 | Stop reason: HOSPADM

## 2019-03-28 RX ORDER — SODIUM CHLORIDE 9 MG/ML
250 INJECTION, SOLUTION INTRAVENOUS AS NEEDED
Status: DISCONTINUED | OUTPATIENT
Start: 2019-03-28 | End: 2019-04-01 | Stop reason: HOSPADM

## 2019-03-28 RX ORDER — ONDANSETRON 2 MG/ML
4 INJECTION INTRAMUSCULAR; INTRAVENOUS
Status: COMPLETED | OUTPATIENT
Start: 2019-03-28 | End: 2019-03-28

## 2019-03-28 RX ORDER — SODIUM CHLORIDE 0.9 % (FLUSH) 0.9 %
5-40 SYRINGE (ML) INJECTION EVERY 8 HOURS
Status: DISCONTINUED | OUTPATIENT
Start: 2019-03-28 | End: 2019-04-01 | Stop reason: HOSPADM

## 2019-03-28 RX ORDER — SUCRALFATE 1 G/1
1 TABLET ORAL
Status: DISCONTINUED | OUTPATIENT
Start: 2019-03-28 | End: 2019-04-01 | Stop reason: HOSPADM

## 2019-03-28 RX ORDER — ONDANSETRON 2 MG/ML
4 INJECTION INTRAMUSCULAR; INTRAVENOUS
Status: DISCONTINUED | OUTPATIENT
Start: 2019-03-28 | End: 2019-04-01 | Stop reason: HOSPADM

## 2019-03-28 RX ORDER — MORPHINE SULFATE 2 MG/ML
1 INJECTION, SOLUTION INTRAMUSCULAR; INTRAVENOUS ONCE
Status: COMPLETED | OUTPATIENT
Start: 2019-03-28 | End: 2019-03-28

## 2019-03-28 RX ORDER — SODIUM CHLORIDE 0.9 % (FLUSH) 0.9 %
5-40 SYRINGE (ML) INJECTION EVERY 8 HOURS
Status: CANCELLED | OUTPATIENT
Start: 2019-03-28

## 2019-03-28 RX ORDER — LIDOCAINE HYDROCHLORIDE 20 MG/ML
INJECTION, SOLUTION EPIDURAL; INFILTRATION; INTRACAUDAL; PERINEURAL AS NEEDED
Status: DISCONTINUED | OUTPATIENT
Start: 2019-03-28 | End: 2019-03-28 | Stop reason: HOSPADM

## 2019-03-28 RX ORDER — MORPHINE SULFATE 2 MG/ML
1 INJECTION, SOLUTION INTRAMUSCULAR; INTRAVENOUS
Status: DISCONTINUED | OUTPATIENT
Start: 2019-03-28 | End: 2019-04-01 | Stop reason: HOSPADM

## 2019-03-28 RX ORDER — SUCRALFATE 1 G/1
1 TABLET ORAL 4 TIMES DAILY
Status: ON HOLD | COMMUNITY
End: 2019-04-01 | Stop reason: SDUPTHER

## 2019-03-28 RX ORDER — SODIUM CHLORIDE 0.9 % (FLUSH) 0.9 %
10 SYRINGE (ML) INJECTION
Status: COMPLETED | OUTPATIENT
Start: 2019-03-28 | End: 2019-03-28

## 2019-03-28 RX ORDER — SODIUM CHLORIDE, SODIUM LACTATE, POTASSIUM CHLORIDE, CALCIUM CHLORIDE 600; 310; 30; 20 MG/100ML; MG/100ML; MG/100ML; MG/100ML
100 INJECTION, SOLUTION INTRAVENOUS CONTINUOUS
Status: DISCONTINUED | OUTPATIENT
Start: 2019-03-28 | End: 2019-03-30

## 2019-03-28 RX ORDER — SODIUM CHLORIDE 0.9 % (FLUSH) 0.9 %
5-40 SYRINGE (ML) INJECTION AS NEEDED
Status: CANCELLED | OUTPATIENT
Start: 2019-03-28

## 2019-03-28 RX ORDER — SODIUM CHLORIDE 9 MG/ML
150 INJECTION, SOLUTION INTRAVENOUS CONTINUOUS
Status: DISCONTINUED | OUTPATIENT
Start: 2019-03-28 | End: 2019-03-30

## 2019-03-28 RX ORDER — SODIUM CHLORIDE, SODIUM LACTATE, POTASSIUM CHLORIDE, CALCIUM CHLORIDE 600; 310; 30; 20 MG/100ML; MG/100ML; MG/100ML; MG/100ML
100 INJECTION, SOLUTION INTRAVENOUS CONTINUOUS
Status: CANCELLED | OUTPATIENT
Start: 2019-03-28

## 2019-03-28 RX ORDER — MORPHINE SULFATE 2 MG/ML
4 INJECTION, SOLUTION INTRAMUSCULAR; INTRAVENOUS ONCE
Status: COMPLETED | OUTPATIENT
Start: 2019-03-28 | End: 2019-03-28

## 2019-03-28 RX ADMIN — SODIUM CHLORIDE, SODIUM LACTATE, POTASSIUM CHLORIDE, AND CALCIUM CHLORIDE 100 ML/HR: 600; 310; 30; 20 INJECTION, SOLUTION INTRAVENOUS at 13:59

## 2019-03-28 RX ADMIN — SODIUM CHLORIDE 80 MG: 900 INJECTION, SOLUTION INTRAVENOUS at 15:28

## 2019-03-28 RX ADMIN — SODIUM CHLORIDE 80 MG: 900 INJECTION, SOLUTION INTRAVENOUS at 03:59

## 2019-03-28 RX ADMIN — PROPOFOL 30 MG: 10 INJECTION, EMULSION INTRAVENOUS at 14:27

## 2019-03-28 RX ADMIN — SODIUM CHLORIDE 100 ML: 900 INJECTION, SOLUTION INTRAVENOUS at 02:15

## 2019-03-28 RX ADMIN — PROPOFOL 20 MG: 10 INJECTION, EMULSION INTRAVENOUS at 14:35

## 2019-03-28 RX ADMIN — Medication 5 ML: at 21:13

## 2019-03-28 RX ADMIN — SODIUM CHLORIDE, SODIUM LACTATE, POTASSIUM CHLORIDE, AND CALCIUM CHLORIDE: 600; 310; 30; 20 INJECTION, SOLUTION INTRAVENOUS at 14:16

## 2019-03-28 RX ADMIN — PROPOFOL 20 MG: 10 INJECTION, EMULSION INTRAVENOUS at 14:33

## 2019-03-28 RX ADMIN — MORPHINE SULFATE 1 MG: 2 INJECTION, SOLUTION INTRAMUSCULAR; INTRAVENOUS at 09:09

## 2019-03-28 RX ADMIN — MORPHINE SULFATE 1 MG: 2 INJECTION, SOLUTION INTRAMUSCULAR; INTRAVENOUS at 11:48

## 2019-03-28 RX ADMIN — SODIUM CHLORIDE 150 ML/HR: 900 INJECTION, SOLUTION INTRAVENOUS at 13:06

## 2019-03-28 RX ADMIN — PROPOFOL 20 MG: 10 INJECTION, EMULSION INTRAVENOUS at 14:23

## 2019-03-28 RX ADMIN — PROPOFOL 20 MG: 10 INJECTION, EMULSION INTRAVENOUS at 14:28

## 2019-03-28 RX ADMIN — MORPHINE SULFATE 4 MG: 2 INJECTION, SOLUTION INTRAMUSCULAR; INTRAVENOUS at 00:51

## 2019-03-28 RX ADMIN — PANTOPRAZOLE SODIUM 80 MG: 40 INJECTION, POWDER, FOR SOLUTION INTRAVENOUS at 03:18

## 2019-03-28 RX ADMIN — SUCRALFATE 1 G: 1 TABLET ORAL at 17:26

## 2019-03-28 RX ADMIN — PANTOPRAZOLE SODIUM 40 MG: 40 INJECTION, POWDER, FOR SOLUTION INTRAVENOUS at 09:09

## 2019-03-28 RX ADMIN — MORPHINE SULFATE 1 MG: 2 INJECTION, SOLUTION INTRAMUSCULAR; INTRAVENOUS at 06:54

## 2019-03-28 RX ADMIN — PROPOFOL 20 MG: 10 INJECTION, EMULSION INTRAVENOUS at 14:25

## 2019-03-28 RX ADMIN — MORPHINE SULFATE 1 MG: 2 INJECTION, SOLUTION INTRAMUSCULAR; INTRAVENOUS at 04:44

## 2019-03-28 RX ADMIN — LIDOCAINE HYDROCHLORIDE 20 MG: 20 INJECTION, SOLUTION EPIDURAL; INFILTRATION; INTRACAUDAL; PERINEURAL at 14:20

## 2019-03-28 RX ADMIN — PROPOFOL 20 MG: 10 INJECTION, EMULSION INTRAVENOUS at 14:30

## 2019-03-28 RX ADMIN — PROPOFOL 50 MG: 10 INJECTION, EMULSION INTRAVENOUS at 14:20

## 2019-03-28 RX ADMIN — PROPOFOL 20 MG: 10 INJECTION, EMULSION INTRAVENOUS at 14:24

## 2019-03-28 RX ADMIN — PROPOFOL 20 MG: 10 INJECTION, EMULSION INTRAVENOUS at 14:29

## 2019-03-28 RX ADMIN — MORPHINE SULFATE 1 MG: 2 INJECTION, SOLUTION INTRAMUSCULAR; INTRAVENOUS at 17:26

## 2019-03-28 RX ADMIN — PROPOFOL 20 MG: 10 INJECTION, EMULSION INTRAVENOUS at 14:37

## 2019-03-28 RX ADMIN — IOPAMIDOL 100 ML: 755 INJECTION, SOLUTION INTRAVENOUS at 02:15

## 2019-03-28 RX ADMIN — ONDANSETRON 4 MG: 2 INJECTION INTRAMUSCULAR; INTRAVENOUS at 09:09

## 2019-03-28 RX ADMIN — SODIUM CHLORIDE 150 ML/HR: 900 INJECTION, SOLUTION INTRAVENOUS at 23:33

## 2019-03-28 RX ADMIN — ONDANSETRON 4 MG: 2 INJECTION INTRAMUSCULAR; INTRAVENOUS at 00:51

## 2019-03-28 RX ADMIN — SUCRALFATE 1 G: 1 TABLET ORAL at 21:13

## 2019-03-28 RX ADMIN — PROPOFOL 20 MG: 10 INJECTION, EMULSION INTRAVENOUS at 14:31

## 2019-03-28 RX ADMIN — DIATRIZOATE MEGLUMINE AND DIATRIZOATE SODIUM 15 ML: 660; 100 LIQUID ORAL; RECTAL at 01:08

## 2019-03-28 RX ADMIN — ONDANSETRON 4 MG: 2 INJECTION INTRAMUSCULAR; INTRAVENOUS at 04:28

## 2019-03-28 RX ADMIN — PROPOFOL 20 MG: 10 INJECTION, EMULSION INTRAVENOUS at 14:21

## 2019-03-28 RX ADMIN — SODIUM CHLORIDE 1000 ML: 900 INJECTION, SOLUTION INTRAVENOUS at 03:18

## 2019-03-28 RX ADMIN — PROPOFOL 30 MG: 10 INJECTION, EMULSION INTRAVENOUS at 14:26

## 2019-03-28 RX ADMIN — ONDANSETRON 4 MG: 2 INJECTION INTRAMUSCULAR; INTRAVENOUS at 21:17

## 2019-03-28 RX ADMIN — Medication 5 ML: at 17:27

## 2019-03-28 RX ADMIN — Medication 10 ML: at 02:16

## 2019-03-28 RX ADMIN — PANTOPRAZOLE SODIUM 40 MG: 40 INJECTION, POWDER, FOR SOLUTION INTRAVENOUS at 21:13

## 2019-03-28 RX ADMIN — MORPHINE SULFATE 1 MG: 2 INJECTION, SOLUTION INTRAMUSCULAR; INTRAVENOUS at 23:32

## 2019-03-28 RX ADMIN — SODIUM CHLORIDE 150 ML/HR: 900 INJECTION, SOLUTION INTRAVENOUS at 04:45

## 2019-03-28 RX ADMIN — ONDANSETRON 4 MG: 2 INJECTION INTRAMUSCULAR; INTRAVENOUS at 17:26

## 2019-03-28 RX ADMIN — MORPHINE SULFATE 1 MG: 2 INJECTION, SOLUTION INTRAMUSCULAR; INTRAVENOUS at 21:13

## 2019-03-28 RX ADMIN — PROPOFOL 20 MG: 10 INJECTION, EMULSION INTRAVENOUS at 14:22

## 2019-03-28 RX ADMIN — SODIUM CHLORIDE 1000 ML: 900 INJECTION, SOLUTION INTRAVENOUS at 00:51

## 2019-03-28 RX ADMIN — LIDOCAINE HYDROCHLORIDE 50 MG: 20 INJECTION, SOLUTION EPIDURAL; INFILTRATION; INTRACAUDAL; PERINEURAL at 14:17

## 2019-03-28 NOTE — PROGRESS NOTES
TRANSFER - IN REPORT: 
 
Verbal report received from april(name) on Esha Elias  being received from er(unit) for routine progression of care Report consisted of patients Situation, Background, Assessment and  
Recommendations(SBAR). Information from the following report(s) ED Summary was reviewed with the receiving nurse. Opportunity for questions and clarification was provided. Assessment completed upon patients arrival to unit and care assumed.  Awaiting arrival,report to [employer]

## 2019-03-28 NOTE — PROGRESS NOTES
TRANSFER - OUT REPORT: 
 
Verbal report given to Arturo KUMAR(name) on Valente Shivers  being transferred to [facility](unit) for routine progression of care Report consisted of patients Situation, Background, Assessment and  
Recommendations(SBAR). Information from the following report(s) Procedure Summary was reviewed with the receiving nurse. Lines:  
Peripheral IV 03/28/19 Left Antecubital (Active) Site Assessment Clean, dry, & intact 3/28/2019 12:11 AM  
Phlebitis Assessment 0 3/28/2019 12:11 AM  
Infiltration Assessment 0 3/28/2019 12:11 AM  
Dressing Status Clean, dry, & intact 3/28/2019 12:11 AM  
  
 
Opportunity for questions and clarification was provided. Patient transported with: 
 [employer]

## 2019-03-28 NOTE — ED TRIAGE NOTES
C/o abdominal pain, n/v/d. Reports black stools and vomit. Onset 2 days pta. Seen here 12/2018 with admission for ugib

## 2019-03-28 NOTE — CONSULTS
[facility] Consult Note       Primary GI Physician: Dr. Catarino Peters    Referring Provider:  Dr. Ever Kuhn Date:  3/28/2019    Admit Date:  3/27/2019    Chief Complaint:  For upper GI endoscopy for upper GI bleeding. Subjective:     History of Present Illness:  Patient is a 46 y.o. female with PMH of arthritis, GERD, HH, kidney stones, migraines, PUD, OA, who is seen in consultation at the request of Dr. Tony Sherman for for upper GI endoscopy for upper GI bleeding. She was seen in Dec 2018 for epigastric pain and hematemesis, and underwent EGD with Dr. Catarino Peters with hiatal hernia, gastritis, gastric polyps, peptic ulcer disease. She was recommended to avoid NSAID use, take Protonix 40 mg PO BID for 8 weeks then reduce to daily dosing, and have repeat EGD in 8-10 weeks. She has not yet had the repeat EGD. She had been having improvement with Protonix BID and Carafate TID, with only nausea off and on, but yesterday began having increased burning epigastric pain, severe, with increased heartburn, nausea, and vomiting. She vomited about 6 times at home with bloody, coffee ground emesis. She denies any NSAID use. She has been having a BM about every 2-3 days, with brown stool. She presented to the ER due to her symptoms and states she had a black stool here. She is a smoker. Her hgb was 14.1 in the ER, decreasing to 10.6, and was 11.1 on recheck. She has received 1 unit of PRBCs. On exam by ER MD, she was noted to have brown stool, guaiac negative. PMH:  Past Medical History:   Diagnosis Date    Arthritis     Gastrointestinal disorder     GERD, hiatal hernia    GERD (gastroesophageal reflux disease)     Kidney stone     Migraines     Nausea and vomiting 12/18/2018    Pyelonephritis, acute 6/30/2011    Pyelonephritis, acute      HH  PUD - episode in ~Sept 2018 with outpt treatment/workup, Dec 2018  Reports colonoscopy prior to 2014 with findings unknown.      EGD with Dr. Catarino Peters with POSTPROCEDURE DIAGNOSIS: 1. Hiatal hernia 2. Gastritis 3. Gastric polyps 4. Peptic ulcer disease PLAN: 1. Follow-up pathology 2. Avoid NSAID use 3. Protonix 40 mg PO BID for 8 weeks then reduce to daily dosing 4. Will arrange repeat EGD in 8-10 weeks 5. Advance diet as tolerated to low residue diet. PSH:  Past Surgical History:   Procedure Laterality Date    HX GYN      hysterectomy    HX OTHER SURGICAL      kidney stone surgery     Lap mona 14 Nov 2017 for chronic cholecystitis and gallstones with Dr Shirley Hyde    Allergies: Allergies   Allergen Reactions    Darvocet-N 100 [Propoxyphene N-Acetaminophen] Nausea and Vomiting    Toradol [Ketorolac Tromethamine] Rash     Pt given IV Toradol in November 2009 with NO resulting rash. Patient did not want to take Toradol on 6/25/11 admission due to the reaction she had in 2008. Home Medications:  Prior to Admission medications    Medication Sig Start Date End Date Taking? Authorizing Provider   pantoprazole (PROTONIX) 40 mg tablet Take 1 Tab by mouth Before breakfast and dinner. 12/21/18   Crenshaw, Corinne Fabry,    HYDROcodone-acetaminophen (NORCO)  mg tablet Take 1 Tab by mouth every eight (8) hours as needed for Pain. Max Daily Amount: 3 Tabs. 12/21/18   Orquidea Bustamante,    ondansetron (ZOFRAN ODT) 4 mg disintegrating tablet Take 1 Tab by mouth every eight (8) hours as needed for Nausea.  7/31/18   Dany Mckeon MD       [facility] Medications:  Current Facility-Administered Medications   Medication Dose Route Frequency    pantoprazole (PROTONIX) 80 mg in 0.9% sodium chloride 250 mL infusion  80 mg IntraVENous CONTINUOUS    0.9% sodium chloride infusion  150 mL/hr IntraVENous CONTINUOUS    sodium chloride (NS) flush 5-40 mL  5-40 mL IntraVENous Q8H    sodium chloride (NS) flush 5-40 mL  5-40 mL IntraVENous PRN    ondansetron (ZOFRAN) injection 4 mg  4 mg IntraVENous Q4H PRN    pantoprazole (PROTONIX) 40 mg in sodium chloride 0.9% 10 mL injection  40 mg IntraVENous Q12H    morphine injection 1 mg  1 mg IntraVENous Q2H PRN    0.9% sodium chloride infusion 250 mL  250 mL IntraVENous PRN       Social History:  Social History     Tobacco Use    Smoking status: Current Every Day Smoker     Packs/day: 1.00     Years: 30.00     Pack years: 30.00    Smokeless tobacco: Never Used   Substance Use Topics    Alcohol use: No       Family History:  Family History   Problem Relation Age of Onset    Diabetes Mother     No Known Problems Father        Review of Systems:  A detailed 10 system ROS is obtained, with pertinent positives as listed above. All others are negative. Objective:     Physical Exam:  Vitals:  Visit Vitals  /48   Pulse 70   Temp 98.5 °F (36.9 °C)   Resp 19   Ht 5' 3\" (1.6 m)   Wt 54 kg (119 lb)   SpO2 94%   BMI 21.08 kg/m²     Gen:  Pt is alert, cooperative, no acute distress, lying in bed, O2 NC in place  Skin:  Extremities and face reveal no rashes. HEENT: Sclerae anicteric. Extra-occular muscles are intact. No oral ulcers. No abnormal pigmentation of the lips. The neck is supple. Cardiovascular: Regular rate and rhythm. No murmurs, gallops, or rubs. Respiratory:  Comfortable breathing with no accessory muscle use. Clear breath sounds anteriorly with no wheezes, rales, or rhonchi. GI:  Abdomen nondistended, soft, and tender over upper abdomen. Normal active bowel sounds. No enlargement of the liver or spleen. No masses palpable. Rectal:  Deferred  Musculoskeletal:  No pitting edema of the lower legs. Neurological:  Gross memory appears intact. Patient is alert and oriented. Psychiatric:  Mood appears appropriate with judgement intact. Lymphatic:  No cervical or supraclavicular adenopathy.     Laboratory:    Recent Labs     03/28/19  1140 03/28/19  0338 03/28/19  0008   WBC 4.6  --  13.7*   HGB 11.1* 10.6* 14.1   HCT 34.1* 34.0* 43.7     --  362   MCV 90.9  --  90.1   NA  --   --  134*   K  --   -- 3.5   CL  --   --  90*   CO2  --   --  30   BUN  --   --  45*   CREA  --   --  1.08*   CA  --   --  10.7*   GLU  --   --  96   AP  --   --  106   SGOT  --   --  11*   ALT  --   --  13   TBILI  --   --  0.5   ALB  --   --  4.5   TP  --   --  8.6*   LPSE  --   --  197   PTP  --   --  13.3   INR  --   --  1.0   APTT  --   --  30.8     CT abdomen and pelvis with contrast 28 March 2019   COMPARISON: July 31, 2018.   INDICATION: Black stools. Vomiting. Abdominal pain.   TECHNIQUE: CT imaging was performed of the abdomen and pelvis following the  uncomplicated administration of intravenous contrast (Isovue 370, 100 mL). Oral  contrast was administered. Radiation dose reduction techniques were used for  this study:  Our CT scanners use one or all of the following: Automated exposure  control, adjustment of the mA and/or kVp according to patient's size, iterative  reconstruction.   FINDINGS:   Minimal dependent subsegmental atelectasis at the lung bases.   Abdomen findings:   Gallbladder is surgically absent. The liver, pancreas, and the spleen are  unremarkable. Abdominal aorta is normal in course and caliber with mild  atherosclerotic calcifications.   Right kidney is atrophic with multiple areas of cortical scarring. There is a  stable 2 cm cyst in the right kidney. There is a tiny nonobstructing left renal  calculus. No hydronephrosis.   There are small bilateral adrenal adenomas, similar dating back to 2009 study. There is no evidence of adenopathy.   There is wall thickening of the gastric pylorus and surrounding inflammatory  changes. Stomach is otherwise normal in contour. Small bowel loops are normal in  caliber. No small bowel obstruction. Pelvic findings:   Urinary bladder is underdistended. There is sigmoid diverticulosis without  diverticulitis. Colon is normal in course and caliber.  Appendix is normal.   There is no free air or free fluid.   IMPRESSION  IMPRESSION:   1. Wall thickening of the gastric pylorus and surrounding inflammatory  stranding. Differential diagnosis includes peptic ulcer disease or gastritis. No evidence of bowel perforation.   2. Sigmoid diverticulosis. Negative for diverticulitis, colitis or appendicitis. No bowel obstruction or hydronephrosis. EGD with Dr. Luis Manuel De Jesus with POSTPROCEDURE DIAGNOSIS: 1. Hiatal hernia 2. Gastritis 3. Gastric polyps 4. Peptic ulcer disease PLAN: 1. Follow-up pathology 2. Avoid NSAID use 3. Protonix 40 mg PO BID for 8 weeks then reduce to daily dosing 4. Will arrange repeat EGD in 8-10 weeks 5. Advance diet as tolerated to low residue diet. Assessment:     Principal Problem:    GI bleed (3/28/2019)    Active Problems:    Melena (12/18/2018)      Acute epigastric pain (12/18/2018)      Leukocytosis (12/18/2018)      GERD (gastroesophageal reflux disease) (12/18/2018)    47 yo female with PMH of arthritis, GERD, HH, kidney stones, migraines, PUD, OA, who is seen in consultation 28 March 2019 at the request of Dr. Levorn Ahumada for for upper GI endoscopy for upper GI bleeding, who was seen for similar symptoms in Dec 2018 with PUD noted on EGD. She has been on Protonix, Carafate as outpatient, and presented with recurrent nausea, vomiting, coffee ground emesis, and black stool. Her hgb was 14.1 in the ER, decreasing to 10.6, and was 11.1 on recheck. She has received 1 unit of PRBCs. On exam by ER MD, she was noted to have brown stool, guaiac negative. CT scan noted wall thickening of the gastric pylorus and surrounding inflammatory stranding. No evidence of bowel perforation.  She is a smoker. Plan:     -Supportive care. -EGD today with Dr. Christopher Sagastume to assess for recurrent bleeding from PUD. Discussed risks including bleeding, perforation, IV complications, sedation risks, and pt states understanding and agrees to proceed. -Protonix gtt.  -NPO. -Follow hgb/hct. -Transfuse PRN. -Follow.  -Smoking cessation.     Patient is seen and examined in collaboration with Dr. Rin Claire. Assessment and plan as per Dr. Eleanor Perkins. Bebe Costa [facility]  [facility]

## 2019-03-28 NOTE — ED NOTES
TRANSFER - OUT REPORT: 
 
Verbal report given to Dorothy Abreu RN on Steph Dubon  being transferred to 8th floor for routine progression of care Report consisted of patients Situation, Background, Assessment and  
Recommendations(SBAR). Information from the following report(s) ED Summary was reviewed with the receiving nurse. Lines:  
Peripheral IV 03/28/19 Left Antecubital (Active) Site Assessment Clean, dry, & intact 3/28/2019 12:11 AM  
Phlebitis Assessment 0 3/28/2019 12:11 AM  
Infiltration Assessment 0 3/28/2019 12:11 AM  
Dressing Status Clean, dry, & intact 3/28/2019 12:11 AM  
   
Peripheral IV 03/28/19 Right Arm (Active) Site Assessment Clean, dry, & intact 3/28/2019 11:46 AM  
Phlebitis Assessment 0 3/28/2019 11:46 AM  
Infiltration Assessment 0 3/28/2019 11:46 AM  
Dressing Status Clean, dry, & intact 3/28/2019 11:46 AM  
  
 
Opportunity for questions and clarification was provided. Patient transported with: 
 Patient-specific medications from Pharmacy

## 2019-03-28 NOTE — PROCEDURES
ESOPHAGOGASTRODUODENOSCOPY    ESOPHAGOGASTRODUODENOSCOPY    DATE of PROCEDURE: 3/28/2019    PT NAME: Sumit Maza     [id number]    MEDICATION:       INSTRUMENT: GIF H 190 and GIF     SPECIAL PROCEDURE: none  BLOOD LOSS- 0 or min. SPEC- Bx of gastric ulcer    PROCEDURE:  Standard EGD      ASSESSMENT:  1. Prepyloric gastric ulcer on the anterior wall side without bleeding. One edge bxed with the XP scope. The pyloric opening was stenotic and had to switch to the XP scope. The duodenum and esophagus were normal.    PLAN:   1. Continue IV Protonix drip, Add Carafate liquid AC and HS or QID, start clears  2. F/U biopsy  3.  Repeat EGD in one month    Earl Gastelum MD

## 2019-03-28 NOTE — ANESTHESIA POSTPROCEDURE EVALUATION
Procedure(s): ESOPHAGOGASTRODUODENOSCOPY (EGD) ESOPHAGOGASTRODUODENAL (EGD) BIOPSY. total IV anesthesia Anesthesia Post Evaluation Multimodal analgesia: multimodal analgesia used between 6 hours prior to anesthesia start to PACU discharge Patient location during evaluation: PACU Patient participation: complete - patient participated Level of consciousness: awake Pain management: adequate Airway patency: patent Anesthetic complications: no 
Cardiovascular status: acceptable and hemodynamically stable Respiratory status: acceptable Hydration status: acceptable Comments: Acceptable for discharge from PACU. Post anesthesia nausea and vomiting:  none Vitals Value Taken Time BP 89/52 3/28/2019  2:55 PM  
Temp 36.7 °C (98 °F) 3/28/2019  2:47 PM  
Pulse 76 3/28/2019  3:04 PM  
Resp 15 3/28/2019  2:47 PM  
SpO2 94 % 3/28/2019  3:04 PM  
Vitals shown include unvalidated device data.

## 2019-03-28 NOTE — ANESTHESIA PREPROCEDURE EVALUATION
Relevant Problems No relevant active problems Anesthetic History PONV Review of Systems / Medical History Patient summary reviewed and pertinent labs reviewed Pulmonary Smoker Neuro/Psych Within defined limits Cardiovascular Within defined limits Exercise tolerance: >4 METS 
  
GI/Hepatic/Renal 
  
GERD: well controlled Renal disease: stones Comments: Coffee ground emesis at home yday - none since admission Endo/Other Arthritis and anemia Other Findings Physical Exam 
 
Airway Mallampati: II 
TM Distance: 4 - 6 cm Neck ROM: normal range of motion Mouth opening: Normal 
 
 Cardiovascular Rhythm: regular Rate: normal 
 
 
 
 Dental 
 
 
Comments: Missing all but one L maxillary molar Pulmonary Breath sounds clear to auscultation Abdominal 
 
 
 
 Other Findings Anesthetic Plan ASA: 3 Anesthesia type: total IV anesthesia Anesthetic plan and risks discussed with: Patient

## 2019-03-28 NOTE — H&P
Date of Surgery Update: 
Basilia Guerra was seen and examined. History and physical has been reviewed. The patient has been examined.  There have been no significant clinical changes since the completion of the originally dated History and Physical. 
 
Signed By: Tariq Herr MD   
 March 28, 2019 2:07 PM

## 2019-03-28 NOTE — ED NOTES
Report received from STACY Chou to assume care at this time. Patient finished with CT contrast. CT called and made aware. Patient resting at this time. VSS.

## 2019-03-28 NOTE — ED PROVIDER NOTES
48-year-old female with history of previous upper GI bleed presents with complaint of confused and was bowel pain with associated nausea, vomiting, diarrhea since around 2 days ago. Patient reports 2 episodes of dark tarry stools earlier today. Patient was admitted in December 2018 for upper GI bleed. Patient denies being on any anticoagulation. Patient denies fever, chills, dysuria, hematuria, vaginal bleeding, vaginal discharge, pelvic pain, chest pain, shortness of breath, flank pain, constipation. Patient reports that she is having diffuse crampy abdominal pain. The history is provided by the patient. No  was used. Vomiting This is a new problem. The current episode started 2 days ago. The problem occurs 2 to 4 times per day. The problem has not changed since onset. The emesis has an appearance of stomach contents. There has been no fever. Associated symptoms include abdominal pain and diarrhea. Pertinent negatives include no chills, no fever, no sweats, no headaches, no arthralgias, no myalgias, no cough, no URI and no headaches. Past Medical History:  
Diagnosis Date  Arthritis  Gastrointestinal disorder GERD, hiatal hernia  GERD (gastroesophageal reflux disease)  Kidney stone  Migraines  Nausea and vomiting 12/18/2018  Pyelonephritis, acute 6/30/2011  Pyelonephritis, acute Past Surgical History:  
Procedure Laterality Date  HX GYN    
 hysterectomy  HX OTHER SURGICAL    
 kidney stone surgery Family History:  
Problem Relation Age of Onset  Diabetes Mother  No Known Problems Father Social History Socioeconomic History  Marital status:  Spouse name: Not on file  Number of children: Not on file  Years of education: Not on file  Highest education level: Not on file Occupational History  Not on file Social Needs  Financial resource strain: Not on file  Food insecurity:  
  Worry: Not on file Inability: Not on file  Transportation needs:  
  Medical: Not on file Non-medical: Not on file Tobacco Use  Smoking status: Current Every Day Smoker Packs/day: 1.00 Years: 30.00 Pack years: 30.00  Smokeless tobacco: Never Used Substance and Sexual Activity  Alcohol use: No  
 Drug use: No  
 Sexual activity: Not Currently Lifestyle  Physical activity:  
  Days per week: Not on file Minutes per session: Not on file  Stress: Not on file Relationships  Social connections:  
  Talks on phone: Not on file Gets together: Not on file Attends Worship service: Not on file Active member of club or organization: Not on file Attends meetings of clubs or organizations: Not on file Relationship status: Not on file  Intimate partner violence:  
  Fear of current or ex partner: Not on file Emotionally abused: Not on file Physically abused: Not on file Forced sexual activity: Not on file Other Topics Concern  Not on file Social History Narrative  Not on file ALLERGIES: Darvocet-n 100 [propoxyphene n-acetaminophen] and Toradol [ketorolac tromethamine] Review of Systems Constitutional: Negative for chills and fever. Respiratory: Negative for cough, shortness of breath and wheezing. Cardiovascular: Negative for chest pain and palpitations. Gastrointestinal: Positive for abdominal pain, blood in stool, diarrhea, nausea and vomiting. Negative for constipation and rectal pain. Genitourinary: Negative for dysuria, flank pain, genital sores, hematuria, vaginal bleeding, vaginal discharge and vaginal pain. Musculoskeletal: Negative for arthralgias and myalgias. Skin: Negative for color change, pallor and rash. Neurological: Negative for dizziness, weakness, light-headedness and headaches. Psychiatric/Behavioral: Negative for confusion. Vitals: 03/27/19 2358 03/28/19 0002 03/28/19 0011 03/28/19 0030 BP:  96/55 Pulse: (!) 114 (!) 111 (!) 112 100 Resp:      
Temp:      
SpO2: 98% 96% 95% 93% Weight:      
Height:      
      
 
Physical Exam  
Constitutional: She is oriented to person, place, and time. She appears well-developed. Mild distress secondary to pain. HENT:  
Head: Normocephalic. MMM. No tonsillar erythema or exudate. Neck: No JVD present. No tracheal deviation present. Cardiovascular: Regular rhythm and normal heart sounds. Tachycardic. Pulses 2+ and equal bilaterally. Pulmonary/Chest: Effort normal.  
CTAB. Abdominal: Soft. Bowel sounds are normal. There is tenderness. Mild diffuse tenderness to palpation. Soft, ND. No rebound or guarding. No CVAT. Genitourinary:  
Genitourinary Comments: Rectal exam with brown stool. Guaiac negative. No fissures or hemorrhoids present. Musculoskeletal: Normal range of motion. Neurological: She is alert and oriented to person, place, and time. No cranial nerve deficit. Strength 5/5 throughout. Normal sensory. Skin: Skin is warm and dry. No rash. Nursing note and vitals reviewed. MDM Number of Diagnoses or Management Options Abdominal pain, epigastric: new and requires workup LIOR (acute kidney injury) [facility]): new and requires workup Hypotension, unspecified hypotension type: new and requires workup Diagnosis management comments: H&H stable. Patient was significant LIOR with elevated creatinine and BUN from baseline. Patient has been intermittently hypotensive while in the ER. CT abdomen and pelvis with evidence of wall thickening gastric pylorus and surrounding inflammatory stranding. Hemoccult negative stool. Orders placed for Protonix bolus and drip. Repeat H&H pending. Given patient hypotension and previous upper GI bleed with CT findings concerning for gastritis versus PUD versus ulcer hospitalist consulted for admission. Amount and/or Complexity of Data Reviewed Clinical lab tests: ordered and reviewed Tests in the radiology section of CPT®: ordered and reviewed Tests in the medicine section of CPT®: ordered and reviewed Review and summarize past medical records: yes Independent visualization of images, tracings, or specimens: yes Risk of Complications, Morbidity, and/or Mortality Presenting problems: moderate Diagnostic procedures: moderate Management options: moderate Patient Progress Patient progress: stable ED Course as of Mar 28 0316 Thu Mar 28, 2019  
0304 CT abd/pelvis IMPRESSION: 
 
1. Wall thickening of the gastric pylorus and surrounding inflammatory 
stranding. Differential diagnosis includes peptic ulcer disease or gastritis. No 
evidence of bowel perforation. 2. Sigmoid diverticulosis. Negative for diverticulitis, colitis or appendicitis. No bowel obstruction or hydronephrosis. [DF] ED Course User Index 
[DF] Alla Alan MD  
 
 
Procedures Results Include: 
 
Recent Results (from the past 24 hour(s)) CBC WITH AUTOMATED DIFF Collection Time: 03/28/19 12:08 AM  
Result Value Ref Range WBC 13.7 (H) 4.3 - 11.1 K/uL  
 RBC 4.85 4.05 - 5.2 M/uL  
 HGB 14.1 11.7 - 15.4 g/dL HCT 43.7 35.8 - 46.3 % MCV 90.1 79.6 - 97.8 FL  
 MCH 29.1 26.1 - 32.9 PG  
 MCHC 32.3 31.4 - 35.0 g/dL  
 RDW 14.4 11.9 - 14.6 % PLATELET 785 184 - 953 K/uL MPV 9.7 9.4 - 12.3 FL ABSOLUTE NRBC 0.00 0.0 - 0.2 K/uL  
 DF AUTOMATED NEUTROPHILS 70 43 - 78 % LYMPHOCYTES 23 13 - 44 % MONOCYTES 7 4.0 - 12.0 % EOSINOPHILS 0 (L) 0.5 - 7.8 % BASOPHILS 1 0.0 - 2.0 % IMMATURE GRANULOCYTES 0 0.0 - 5.0 %  
 ABS. NEUTROPHILS 9.5 (H) 1.7 - 8.2 K/UL  
 ABS. LYMPHOCYTES 3.1 0.5 - 4.6 K/UL  
 ABS. MONOCYTES 0.9 0.1 - 1.3 K/UL  
 ABS. EOSINOPHILS 0.0 0.0 - 0.8 K/UL  
 ABS. BASOPHILS 0.1 0.0 - 0.2 K/UL  
 ABS. IMM. GRANS. 0.0 0.0 - 0.5 K/UL METABOLIC PANEL, COMPREHENSIVE  
 Collection Time: 03/28/19 12:08 AM  
Result Value Ref Range Sodium 134 (L) 136 - 145 mmol/L Potassium 3.5 3.5 - 5.1 mmol/L Chloride 90 (L) 98 - 107 mmol/L  
 CO2 30 21 - 32 mmol/L Anion gap 14 7 - 16 mmol/L Glucose 96 65 - 100 mg/dL BUN 45 (H) 6 - 23 MG/DL Creatinine 1.08 (H) 0.6 - 1.0 MG/DL  
 GFR est AA >60 >60 ml/min/1.73m2 GFR est non-AA 56 (L) >60 ml/min/1.73m2 Calcium 10.7 (H) 8.3 - 10.4 MG/DL Bilirubin, total 0.5 0.2 - 1.1 MG/DL  
 ALT (SGPT) 13 12 - 65 U/L  
 AST (SGOT) 11 (L) 15 - 37 U/L Alk. phosphatase 106 50 - 136 U/L Protein, total 8.6 (H) 6.3 - 8.2 g/dL Albumin 4.5 3.5 - 5.0 g/dL Globulin 4.1 (H) 2.3 - 3.5 g/dL A-G Ratio 1.1 (L) 1.2 - 3.5 PTT Collection Time: 03/28/19 12:08 AM  
Result Value Ref Range aPTT 30.8 24.7 - 39.8 SEC PROTHROMBIN TIME + INR Collection Time: 03/28/19 12:08 AM  
Result Value Ref Range Prothrombin time 13.3 11.7 - 14.5 sec INR 1.0 LIPASE Collection Time: 03/28/19 12:08 AM  
Result Value Ref Range Lipase 197 73 - 393 U/L Josue Guo MD; 3/28/2019 @12:47 AM Voice dictation software was used during the making of this note. This software is not perfect and grammatical and other typographical errors may be present.   This note has not been proofread for errors. 
===================================================================

## 2019-03-28 NOTE — PROGRESS NOTES
Pt admitted to room 827 from ER via stretcher and transport. Assessment completed upon patients arrival to unit and care assumed. Respiration even and unlabored 20/min at rest. VSS. Afebrile at present. O2 sats 98% on 2 liters. Pt is alert and oriented, able to make needs known. Oriented to room and hospital protocols. Instructed to call for assistance with any needs. Verbalizes understanding. Dual full body skin assessment completed by Samy Marr RN and this nurse. No breakdown to sacrum and heels noted. No needs voiced. Bed locked and lowered, call light in reach. Remains NPO per order. No family at bedside at present. Will continue to monitor.

## 2019-03-28 NOTE — H&P
HOSPITALIST INITIAL HISTORY AND PHYSICAL 
NAME:  Vladimir Mullen Age:  46 y.o. 
:   1965 MRN:   [id number] PCP: None Consulting MD: Treatment Team: Attending Provider: Sedrick Wallis MD; Primary Nurse: Ayala Ronquillo CHIEF COMPLAINT: abd pain, melena HISTORY OF PRESENT ILLNESS:  
Vladimir Mullen is a 46 y.o. female with a past medical history of GERD and previous PUD who presents to the ER with complaint of sudden onset severe epigastric pain yesterday afternoon, followed by several episodes of vomiting black, coffee ground material and black tarry stools. She reports that the pain has persisted and is a bit better now, s/p IV morphine. She admits to persistent nausea as well. Denies any bright red blood in vomit or stool. REVIEW OF SYSTEMS: Comprehensive ROS performed and negative except as stated in HPI. Past Medical History:  
Diagnosis Date  Arthritis  Gastrointestinal disorder GERD, hiatal hernia  GERD (gastroesophageal reflux disease)  Kidney stone  Migraines  Nausea and vomiting 2018  Pyelonephritis, acute 2011  Pyelonephritis, acute Past Surgical History:  
Procedure Laterality Date  HX GYN    
 hysterectomy  HX OTHER SURGICAL    
 kidney stone surgery Prior to Admission Medications Prescriptions Last Dose Informant Patient Reported? Taking? HYDROcodone-acetaminophen (NORCO)  mg tablet   No No  
Sig: Take 1 Tab by mouth every eight (8) hours as needed for Pain. Max Daily Amount: 3 Tabs. ondansetron (ZOFRAN ODT) 4 mg disintegrating tablet   No No  
Sig: Take 1 Tab by mouth every eight (8) hours as needed for Nausea. pantoprazole (PROTONIX) 40 mg tablet   No No  
Sig: Take 1 Tab by mouth Before breakfast and dinner. Facility-Administered Medications: None Allergies Allergen Reactions  Darvocet-N 100 [Propoxyphene N-Acetaminophen] Nausea and Vomiting  Toradol [Ketorolac Tromethamine] Rash Pt given IV Toradol in 2009 with NO resulting rash. Patient did not want to take Toradol on 11 admission due to the reaction she had in . FAMILY HISTORY: Reviewed. Negative except Family History Problem Relation Age of Onset  Diabetes Mother  No Known Problems Father Social History Tobacco Use  Smoking status: Current Every Day Smoker Packs/day: 1.00 Years: 30.00 Pack years: 30.00  Smokeless tobacco: Never Used Substance Use Topics  Alcohol use: No  
 
 
 
Objective:  
 
Visit Vitals BP 99/48 Pulse 91 Temp 97.6 °F (36.4 °C) Resp 18 Ht 5' 3\" (1.6 m) Wt 54 kg (119 lb) SpO2 93% BMI 21.08 kg/m² Temp (24hrs), Av.6 °F (36.4 °C), Min:97.6 °F (36.4 °C), Max:97.6 °F (36.4 °C) Oxygen Therapy O2 Sat (%): 93 % (19 0100) Pulse via Oximetry: 91 beats per minute (19 0100) O2 Device: Room air (19 2352) Physical Exam: 
General:    The patient is a pleasant elderly female in no acute distress. Head:   Normocephalic/atraumatic. Eyes:  No palpebral pallor or scleral icterus. ENT:  External auricular and nasal exam within normal limits. Mucous membranes are moist. 
Neck:  Supple, non-tender, no JVD. Lungs:   Clear to auscultation bilaterally without wheezes or crackles. No respiratory distress or accessory muscle use. Heart:   Regular rate and rhythm, without murmurs, rubs, or gallops. Abdomen:   Soft, moderately TTP throughout, focused around lower epigastrium, non-distended with normoactive bowel sounds. Genitourinary: No tenderness over the bladder or bilateral CVAs. Extremities: Without clubbing, cyanosis, or edema. Skin:     Normal color, texture, and turgor. No rashes, lesions, or jaundice. Pulses: Radial and dorsalis pedis pulses present 2+ bilaterally. Capillary refill <2s.   
Neurologic: CN II-XII grossly intact and symmetrical.  
 Moving all four extremities well with no focal deficits. Psychiatric: Pleasant demeanor, appropriate affect. Alert and oriented x 3 Data Review:  
Recent Results (from the past 24 hour(s)) CBC WITH AUTOMATED DIFF Collection Time: 03/28/19 12:08 AM  
Result Value Ref Range WBC 13.7 (H) 4.3 - 11.1 K/uL  
 RBC 4.85 4.05 - 5.2 M/uL  
 HGB 14.1 11.7 - 15.4 g/dL HCT 43.7 35.8 - 46.3 % MCV 90.1 79.6 - 97.8 FL  
 MCH 29.1 26.1 - 32.9 PG  
 MCHC 32.3 31.4 - 35.0 g/dL  
 RDW 14.4 11.9 - 14.6 % PLATELET 430 054 - 719 K/uL MPV 9.7 9.4 - 12.3 FL ABSOLUTE NRBC 0.00 0.0 - 0.2 K/uL  
 DF AUTOMATED NEUTROPHILS 70 43 - 78 % LYMPHOCYTES 23 13 - 44 % MONOCYTES 7 4.0 - 12.0 % EOSINOPHILS 0 (L) 0.5 - 7.8 % BASOPHILS 1 0.0 - 2.0 % IMMATURE GRANULOCYTES 0 0.0 - 5.0 %  
 ABS. NEUTROPHILS 9.5 (H) 1.7 - 8.2 K/UL  
 ABS. LYMPHOCYTES 3.1 0.5 - 4.6 K/UL  
 ABS. MONOCYTES 0.9 0.1 - 1.3 K/UL  
 ABS. EOSINOPHILS 0.0 0.0 - 0.8 K/UL  
 ABS. BASOPHILS 0.1 0.0 - 0.2 K/UL  
 ABS. IMM. GRANS. 0.0 0.0 - 0.5 K/UL METABOLIC PANEL, COMPREHENSIVE Collection Time: 03/28/19 12:08 AM  
Result Value Ref Range Sodium 134 (L) 136 - 145 mmol/L Potassium 3.5 3.5 - 5.1 mmol/L Chloride 90 (L) 98 - 107 mmol/L  
 CO2 30 21 - 32 mmol/L Anion gap 14 7 - 16 mmol/L Glucose 96 65 - 100 mg/dL BUN 45 (H) 6 - 23 MG/DL Creatinine 1.08 (H) 0.6 - 1.0 MG/DL  
 GFR est AA >60 >60 ml/min/1.73m2 GFR est non-AA 56 (L) >60 ml/min/1.73m2 Calcium 10.7 (H) 8.3 - 10.4 MG/DL Bilirubin, total 0.5 0.2 - 1.1 MG/DL  
 ALT (SGPT) 13 12 - 65 U/L  
 AST (SGOT) 11 (L) 15 - 37 U/L Alk. phosphatase 106 50 - 136 U/L Protein, total 8.6 (H) 6.3 - 8.2 g/dL Albumin 4.5 3.5 - 5.0 g/dL Globulin 4.1 (H) 2.3 - 3.5 g/dL A-G Ratio 1.1 (L) 1.2 - 3.5 TYPE & SCREEN Collection Time: 03/28/19 12:08 AM  
Result Value Ref Range Crossmatch Expiration 03/31/2019 ABO/Rh(D) A NEGATIVE  Antibody screen NEG   
 PTT  
 Collection Time: 03/28/19 12:08 AM  
Result Value Ref Range aPTT 30.8 24.7 - 39.8 SEC PROTHROMBIN TIME + INR Collection Time: 03/28/19 12:08 AM  
Result Value Ref Range Prothrombin time 13.3 11.7 - 14.5 sec INR 1.0 LIPASE Collection Time: 03/28/19 12:08 AM  
Result Value Ref Range Lipase 197 73 - 393 U/L  
HGB & HCT Collection Time: 03/28/19  3:38 AM  
Result Value Ref Range HGB 10.6 (L) 11.7 - 15.4 g/dL HCT 34.0 (L) 35.8 - 46.3 % URINE MICROSCOPIC Collection Time: 03/28/19  3:55 AM  
Result Value Ref Range WBC 5-10 0 /hpf  
 RBC 5-10 0 /hpf Epithelial cells 3-5 0 /hpf Bacteria 0 0 /hpf Casts HYALINE 0 /lpf Crystals, urine 0 0 /LPF Amorphous Crystals 2+ (H) 0 Mucus 0 0 /lpf Other observations RESULTS VERIFIED MANUALLY Imaging /Procedures /Studies: 
Ct Abd [employer] W Cont Result Date: 3/28/2019 IMPRESSION: 1. Wall thickening of the gastric pylorus and surrounding inflammatory stranding. Differential diagnosis includes peptic ulcer disease or gastritis. No evidence of bowel perforation. 2. Sigmoid diverticulosis. Negative for diverticulitis, colitis or appendicitis. No bowel obstruction or hydronephrosis. Assessment and Plan:  
 
Principal Problem: 
  GI bleed (3/28/2019) LIkely gastric. NPO, IV Protonix. Consult GI for possible endoscopy. Active Problems: 
  Melena (12/18/2018) Per above. Follow hemoglobin q6h. IVF, transfuse hemoglobin. Acute epigastric pain (12/18/2018) Manage symptoms with IV Pain/nausea medications. Leukocytosis (12/18/2018) Follow CBC. GERD (gastroesophageal reflux disease) (12/18/2018) Per above. DVT Prophylaxis: SCDs. Code Status: FULL CODE Disposition: Observe on med/surg for evaluation and treatment as per above. Anticipated discharge: < 2 midnights Signed By: Fuad Morton MD   
 March 28, 2019

## 2019-03-28 NOTE — PROGRESS NOTES
Returns back to room from GI lab via stretcher and transport. Denies needs at present. Denies pain/nausea at present. Wants to eat. Will start diet as ordered. To apply SCDs. No family at bedside. Will continue to monitor.

## 2019-03-29 PROBLEM — E86.1 HYPOTENSION DUE TO HYPOVOLEMIA: Status: ACTIVE | Noted: 2019-03-29

## 2019-03-29 PROBLEM — D62 ACUTE BLOOD LOSS ANEMIA: Status: ACTIVE | Noted: 2019-03-29

## 2019-03-29 PROBLEM — I95.89 HYPOTENSION DUE TO HYPOVOLEMIA: Status: ACTIVE | Noted: 2019-03-29

## 2019-03-29 PROBLEM — K27.9 PEPTIC ULCER: Status: ACTIVE | Noted: 2019-03-29

## 2019-03-29 LAB
ABO + RH BLD: NORMAL
ANION GAP SERPL CALC-SCNC: 4 MMOL/L (ref 7–16)
BLD PROD TYP BPU: NORMAL
BLOOD BANK CMNT PATIENT-IMP: NORMAL
BLOOD GROUP ANTIBODIES SERPL: NORMAL
BPU ID: NORMAL
BUN SERPL-MCNC: 15 MG/DL (ref 6–23)
CALCIUM SERPL-MCNC: 7.3 MG/DL (ref 8.3–10.4)
CHLORIDE SERPL-SCNC: 111 MMOL/L (ref 98–107)
CO2 SERPL-SCNC: 28 MMOL/L (ref 21–32)
CREAT SERPL-MCNC: 0.41 MG/DL (ref 0.6–1)
CROSSMATCH RESULT,%XM: NORMAL
GLUCOSE SERPL-MCNC: 75 MG/DL (ref 65–100)
HCT VFR BLD AUTO: 31 % (ref 35.8–46.3)
HCT VFR BLD AUTO: 33 % (ref 35.8–46.3)
HGB BLD-MCNC: 10.2 G/DL (ref 11.7–15.4)
HGB BLD-MCNC: 10.2 G/DL (ref 11.7–15.4)
HGB BLD-MCNC: 9.8 G/DL (ref 11.7–15.4)
POTASSIUM SERPL-SCNC: 3.6 MMOL/L (ref 3.5–5.1)
SODIUM SERPL-SCNC: 143 MMOL/L (ref 136–145)
SPECIMEN EXP DATE BLD: NORMAL
STATUS OF UNIT,%ST: NORMAL
UNIT DIVISION, %UDIV: 0

## 2019-03-29 PROCEDURE — 74011250637 HC RX REV CODE- 250/637: Performed by: INTERNAL MEDICINE

## 2019-03-29 PROCEDURE — 36415 COLL VENOUS BLD VENIPUNCTURE: CPT

## 2019-03-29 PROCEDURE — 74011000250 HC RX REV CODE- 250: Performed by: FAMILY MEDICINE

## 2019-03-29 PROCEDURE — C9113 INJ PANTOPRAZOLE SODIUM, VIA: HCPCS | Performed by: FAMILY MEDICINE

## 2019-03-29 PROCEDURE — 74011250636 HC RX REV CODE- 250/636: Performed by: EMERGENCY MEDICINE

## 2019-03-29 PROCEDURE — 80048 BASIC METABOLIC PNL TOTAL CA: CPT

## 2019-03-29 PROCEDURE — 99218 HC RM OBSERVATION: CPT

## 2019-03-29 PROCEDURE — 77030020263 HC SOL INJ SOD CL0.9% LFCR 1000ML

## 2019-03-29 PROCEDURE — 85014 HEMATOCRIT: CPT

## 2019-03-29 PROCEDURE — 65270000029 HC RM PRIVATE

## 2019-03-29 PROCEDURE — C9113 INJ PANTOPRAZOLE SODIUM, VIA: HCPCS | Performed by: EMERGENCY MEDICINE

## 2019-03-29 PROCEDURE — 85018 HEMOGLOBIN: CPT

## 2019-03-29 PROCEDURE — 74011250636 HC RX REV CODE- 250/636: Performed by: FAMILY MEDICINE

## 2019-03-29 RX ADMIN — MORPHINE SULFATE 1 MG: 2 INJECTION, SOLUTION INTRAMUSCULAR; INTRAVENOUS at 11:19

## 2019-03-29 RX ADMIN — Medication 10 ML: at 05:04

## 2019-03-29 RX ADMIN — SUCRALFATE 1 G: 1 TABLET ORAL at 21:15

## 2019-03-29 RX ADMIN — SODIUM CHLORIDE 80 MG: 900 INJECTION, SOLUTION INTRAVENOUS at 05:03

## 2019-03-29 RX ADMIN — SUCRALFATE 1 G: 1 TABLET ORAL at 11:12

## 2019-03-29 RX ADMIN — Medication 10 ML: at 21:15

## 2019-03-29 RX ADMIN — ONDANSETRON 4 MG: 2 INJECTION INTRAMUSCULAR; INTRAVENOUS at 05:44

## 2019-03-29 RX ADMIN — SODIUM CHLORIDE 150 ML/HR: 900 INJECTION, SOLUTION INTRAVENOUS at 09:00

## 2019-03-29 RX ADMIN — PANTOPRAZOLE SODIUM 40 MG: 40 INJECTION, POWDER, FOR SOLUTION INTRAVENOUS at 08:21

## 2019-03-29 RX ADMIN — MORPHINE SULFATE 1 MG: 2 INJECTION, SOLUTION INTRAMUSCULAR; INTRAVENOUS at 08:36

## 2019-03-29 RX ADMIN — SODIUM CHLORIDE 150 ML/HR: 900 INJECTION, SOLUTION INTRAVENOUS at 05:47

## 2019-03-29 RX ADMIN — MORPHINE SULFATE 1 MG: 2 INJECTION, SOLUTION INTRAMUSCULAR; INTRAVENOUS at 18:28

## 2019-03-29 RX ADMIN — SUCRALFATE 1 G: 1 TABLET ORAL at 17:14

## 2019-03-29 RX ADMIN — SUCRALFATE 1 G: 1 TABLET ORAL at 05:04

## 2019-03-29 RX ADMIN — MORPHINE SULFATE 1 MG: 2 INJECTION, SOLUTION INTRAMUSCULAR; INTRAVENOUS at 14:38

## 2019-03-29 RX ADMIN — ONDANSETRON 4 MG: 2 INJECTION INTRAMUSCULAR; INTRAVENOUS at 11:24

## 2019-03-29 RX ADMIN — MORPHINE SULFATE 1 MG: 2 INJECTION, SOLUTION INTRAMUSCULAR; INTRAVENOUS at 05:44

## 2019-03-29 RX ADMIN — MORPHINE SULFATE 1 MG: 2 INJECTION, SOLUTION INTRAMUSCULAR; INTRAVENOUS at 21:15

## 2019-03-29 RX ADMIN — SODIUM CHLORIDE 80 MG: 900 INJECTION, SOLUTION INTRAVENOUS at 17:15

## 2019-03-29 RX ADMIN — Medication 30 ML: at 14:39

## 2019-03-29 RX ADMIN — ONDANSETRON 4 MG: 2 INJECTION INTRAMUSCULAR; INTRAVENOUS at 18:35

## 2019-03-29 NOTE — PROGRESS NOTES
Patient resting in bed watching television, received AM medications as ordered, received prn pain medication Morphine rating pain scale above 7, received prn Zofran for nausea, no distress noted, will continue to monitor.

## 2019-03-29 NOTE — PROGRESS NOTES
Problem: Falls - Risk of 
Goal: *Absence of Falls Description Document Cal Bolden Fall Risk and appropriate interventions in the flowsheet.  
Outcome: Progressing Towards Goal

## 2019-03-29 NOTE — PROGRESS NOTES
Hospitalist Progress Note Patient: Jenifer Gongora MRN: [id number]  SSN: [id number] YOB: 1965  Age: 46 y.o. Sex: female Admit Date: 3/27/2019 LOS: 0 days Subjective:  
 
46year old CF with a PMH of PUD/GERD presented to the ER on 3/28/19 with epigastric pain, coffee ground emesis, and melena. She went for EGD on 3/28 and a non-bleeding prepyloric ulcer was found. She was started on a Pantoprazole drip. 3/29 - This morning she feels better. States she had a black stool this AM. Epigastric pain improved. Denies F/C/N/V. Review of systems negative except stated above. Objective:  
 
Visit Vitals BP 99/63 Pulse (!) 57 Temp 98.2 °F (36.8 °C) Resp 18 Ht 5' 3\" (1.6 m) Wt 53.8 kg (118 lb 11.2 oz) SpO2 94% Breastfeeding? No  
BMI 21.03 kg/m² Oxygen Therapy O2 Sat (%): 94 % (03/29/19 1118) Pulse via Oximetry: 84 beats per minute (03/28/19 1631) O2 Device: Room air (03/28/19 5417) O2 Flow Rate (L/min): 2 l/min (03/28/19 1931) Intake and Output:  
 
Intake/Output Summary (Last 24 hours) at 3/29/2019 1241 Last data filed at 3/28/2019 2314 Gross per 24 hour Intake 600 ml Output 200 ml Net 400 ml Physical Exam:  
GENERAL: alert, cooperative, no distress, appears stated age EYE: conjunctivae/corneas clear. PERRL. THROAT & NECK: normal and no erythema or exudates noted. LUNG: clear to auscultation bilaterally HEART: regular rate and rhythm, S1S2, no murmur, no JVD ABDOMEN: soft, epigastric tender, non-distended. Bowel sounds normal.  
EXTREMITIES:  No edema, 2+ pedal/radial pulses bilaterally SKIN: no rash or abnormalities NEUROLOGIC: A&Ox3. Cranial nerves 2-12 grossly intact. Lab/Data Review: 
Recent Results (from the past 24 hour(s)) HEMOGLOBIN Collection Time: 03/28/19  4:25 PM  
Result Value Ref Range HGB 10.7 (L) 11.7 - 15.4 g/dL METABOLIC PANEL, BASIC  Collection Time: 03/29/19  4:09 AM  
 Result Value Ref Range Sodium 143 136 - 145 mmol/L Potassium 3.6 3.5 - 5.1 mmol/L Chloride 111 (H) 98 - 107 mmol/L  
 CO2 28 21 - 32 mmol/L Anion gap 4 (L) 7 - 16 mmol/L Glucose 75 65 - 100 mg/dL BUN 15 6 - 23 MG/DL Creatinine 0.41 (L) 0.6 - 1.0 MG/DL  
 GFR est AA >60 >60 ml/min/1.73m2 GFR est non-AA >60 >60 ml/min/1.73m2 Calcium 7.3 (L) 8.3 - 10.4 MG/DL  
HEMOGLOBIN Collection Time: 03/29/19  4:09 AM  
Result Value Ref Range HGB 10.2 (L) 11.7 - 15.4 g/dL Imaging: 
Ct Abd Pelv W Cont Result Date: 3/28/2019 CT abdomen and pelvis with contrast COMPARISON: July 31, 2018. INDICATION: Black stools. Vomiting. Abdominal pain. TECHNIQUE: CT imaging was performed of the abdomen and pelvis following the uncomplicated administration of intravenous contrast (Isovue 370, 100 mL). Oral contrast was administered. Radiation dose reduction techniques were used for this study:  Our CT scanners use one or all of the following: Automated exposure control, adjustment of the mA and/or kVp according to patient's size, iterative reconstruction. FINDINGS: Minimal dependent subsegmental atelectasis at the lung bases. Abdomen findings: Gallbladder is surgically absent. The liver, pancreas, and the spleen are unremarkable. Abdominal aorta is normal in course and caliber with mild atherosclerotic calcifications. Right kidney is atrophic with multiple areas of cortical scarring. There is a stable 2 cm cyst in the right kidney. There is a tiny nonobstructing left renal calculus. No hydronephrosis. There are small bilateral adrenal adenomas, similar dating back to 2009 study. There is no evidence of adenopathy. There is wall thickening of the gastric pylorus and surrounding inflammatory changes. Stomach is otherwise normal in contour. Small bowel loops are normal in caliber. No small bowel obstruction.  Pelvic findings: Urinary bladder is underdistended. There is sigmoid diverticulosis without diverticulitis. Colon is normal in course and caliber. Appendix is normal. There is no free air or free fluid. IMPRESSION: 1. Wall thickening of the gastric pylorus and surrounding inflammatory stranding. Differential diagnosis includes peptic ulcer disease or gastritis. No evidence of bowel perforation. 2. Sigmoid diverticulosis. Negative for diverticulitis, colitis or appendicitis. No bowel obstruction or hydronephrosis. No results found for this visit on 03/27/19. Cultures: All Micro Results None Assessment/Plan:  
 
Principal Problem: 
  Acute blood loss anemia (3/29/2019) - Hgb down this AM to 10.2 
- Continue to trend H/H Q8H 
- Monitor for signs of loss - GI following Active Problems: 
  Melena (12/18/2018) - Likely due to gastric ulcer - Still with some melena this AM 
 
  Peptic ulcer (3/29/2019) 
- Continue Pantoprazole drip 
- GI following Acute epigastric pain (12/18/2018) - Due to gastric ulcer - Improved GI bleed (3/28/2019) - Due to gastric ulcer - Continue Pantoprazole drip 
- Continue to trend H/H Q8H 
- GI following Hypotension due to hypovolemia (3/29/2019) - Due to hypovolemia from GIB 
- Resolved with IVFs Leukocytosis (12/18/2018) - Reactive - Resolved Sinus tachycardia (12/18/2018) - Resolved GERD (gastroesophageal reflux disease) (12/18/2018) - Continue PPI drip Dispo - Continue Pantoprazole drip. Trend H/H. DIET CLEAR LIQUID DVT Prophylaxis: SCDs Signed By: Stephanie Nunez DO March 29, 2019

## 2019-03-29 NOTE — PROGRESS NOTES
[facility] Progress Note Admit Date:  3/27/2019 Today's Date:  3/29/2019 CC:  GIB Subjective:  
 
Patient with continued upper abdominal pain, melena, and drop in hgb. EGD 3/28 showed pre-pyloric ulcer with stricture of the pylorus. pathology pending.  hgb now 10.2. Medications:  
Current Facility-Administered Medications Medication Dose Route Frequency  pantoprazole (PROTONIX) 80 mg in 0.9% sodium chloride 250 mL infusion  80 mg IntraVENous CONTINUOUS  
 0.9% sodium chloride infusion  150 mL/hr IntraVENous CONTINUOUS  
 sodium chloride (NS) flush 5-40 mL  5-40 mL IntraVENous Q8H  
 sodium chloride (NS) flush 5-40 mL  5-40 mL IntraVENous PRN  
 ondansetron (ZOFRAN) injection 4 mg  4 mg IntraVENous Q4H PRN  pantoprazole (PROTONIX) 40 mg in sodium chloride 0.9% 10 mL injection  40 mg IntraVENous Q12H  
 morphine injection 1 mg  1 mg IntraVENous Q2H PRN  
 0.9% sodium chloride infusion 250 mL  250 mL IntraVENous PRN  
 lactated Ringers infusion  100 mL/hr IntraVENous CONTINUOUS  
 sucralfate (CARAFATE) tablet 1 g  1 g Oral AC&HS Review of Systems: ROS was obtained, with pertinent positives as listed above. No chest pain or SOB. Diet:  CLD Objective:  
Vitals: 
Visit Vitals /53 Pulse 67 Temp 98.5 °F (36.9 °C) Resp 19 Ht 5' 3\" (1.6 m) Wt 53.8 kg (118 lb 11.2 oz) SpO2 92% Breastfeeding? No  
BMI 21.03 kg/m² Intake/Output: 
No intake/output data recorded. 03/27 1901 - 03/29 0700 In: 910 [I.V.:600] Out: 500 [Urine:500] Exam: 
General appearance: alert, cooperative, no distress Lungs: clear to auscultation bilaterally anteriorly Heart: regular rate and rhythm Abdomen: soft, non-distended. TTP upper abd bilat with +guarding, no rebound. Bowel sounds normal. No masses, no organomegaly Extremities: extremities normal, atraumatic, no cyanosis or edema Neuro:  alert and oriented Data Review (Labs):   
Recent Labs  
  03/29/19 0809 03/28/19 
1625 03/28/19 
1218 03/28/19 
1140 03/28/19 
7925 03/28/19 
0008 WBC  --   --  6.6 4.6  --  13.7* HGB 10.2* 10.7* 11.1* 11.1* 10.6* 14.1 HCT  --   --  34.7* 34.1* 34.0* 43.7 PLT  --   --  159 176  --  362 MCV  --   --  92.8 90.9  --  90.1   --   --   --   --  134* K 3.6  --   --   --   --  3.5 *  --   --   --   --  90* CO2 28  --   --   --   --  30 BUN 15  --   --   --   --  45* CREA 0.41*  --   --   --   --  1.08* CA 7.3*  --   --   --   --  10.7* GLU 75  --   --   --   --  96  
AP  --   --   --   --   --  106 SGOT  --   --   --   --   --  11* ALT  --   --   --   --   --  13  
TBILI  --   --   --   --   --  0.5 ALB  --   --   --   --   --  4.5 TP  --   --   --   --   --  8.6* LPSE  --   --   --   --   --  197 PTP  --   --   --   --   --  13.3 INR  --   --   --   --   --  1.0 APTT  --   --   --   --   --  30.8 Assessment:  
 
Principal Problem: 
  GI bleed (3/28/2019) Active Problems: 
  Melena (12/18/2018) Acute epigastric pain (12/18/2018) Leukocytosis (12/18/2018) GERD (gastroesophageal reflux disease) (12/18/2018) 45 yo female with PMH of arthritis, GERD, HH, kidney stones, migraines, PUD, OA, who is seen in consultation 28 March 2019 at the request of Dr. Arley So for for upper GI endoscopy for upper GI bleeding, who was seen for similar symptoms in Dec 2018 with PUD noted on EGD. She has been on Protonix, Carafate as outpatient, and presented with recurrent nausea, vomiting, coffee ground emesis, and black stool. Her hgb was 14.1 in the ER, decreasing to 10.6, and was 11.1 on recheck. She has received 1 unit of PRBCs. On exam by ER MD, she was noted to have brown stool, guaiac negative. CT scan noted wall thickening of the gastric pylorus and surrounding inflammatory stranding. No evidence of bowel perforation.  She is a smoker. EGD 3/28 with prepyloric ulcer, path pending and pyloric stenosis. Plan: - continue PPI gtt and po Carafate 
- monitor H/H 
- await path 
- consider repeat EGD if bleeding continues. [facility] [place] Patient is seen and examined in collaboration with Dr. Mary Rubio. Assessment and plan as per Dr. Mary Rubio. =Some melena today. Hgb this afternoon is still 10.2, stable. Awaiting pathology results.  
Devon Duarte MD

## 2019-03-29 NOTE — PHYSICIAN ADVISORY
Letter of Determination: Upgrade from Observation to Inpatient Status This patient was originally hospitalized as Outpatient Status with Observation Services on 3/28/2019 for gastrointestinal bleed. This patient now meets for Inpatient Admission based on medical necessity. The patient's stay was medically necessary based on vital signs instability with pulse of 124 beats per minute, and blood pressure to 86/52 mmHg. It is our recommendation that this patient's hospitalization status should be upgraded from OBSERVATION to INPATIENT status.  
  
The final decision regarding the patient's hospitalization status depends on the attending physician's judgement. Gabriel Mims MD, MARGARET, Physician Advisor [address] [place] [address].

## 2019-03-29 NOTE — PROGRESS NOTES
Bedside, Verbal and Written shift change report given to [address] [facility] [address], RN (oncoming nurse) by Farhan Medina RN (offgoing nurse). Report included the following information SBAR, Kardex, Intake/Output, MAR, Recent Results and Quality Measures. Patient alert and oriented x4, resting quietly in bed in no apparent distress on room air. NS @ 150mL/hr Protonix @ 25mL/hr

## 2019-03-30 LAB
ANION GAP SERPL CALC-SCNC: 6 MMOL/L (ref 7–16)
BUN SERPL-MCNC: 4 MG/DL (ref 6–23)
CALCIUM SERPL-MCNC: 7.4 MG/DL (ref 8.3–10.4)
CHLORIDE SERPL-SCNC: 114 MMOL/L (ref 98–107)
CO2 SERPL-SCNC: 25 MMOL/L (ref 21–32)
CREAT SERPL-MCNC: 0.38 MG/DL (ref 0.6–1)
GLUCOSE SERPL-MCNC: 79 MG/DL (ref 65–100)
HCT VFR BLD AUTO: 32.5 % (ref 35.8–46.3)
HCT VFR BLD AUTO: 32.8 % (ref 35.8–46.3)
HGB BLD-MCNC: 10 G/DL (ref 11.7–15.4)
HGB BLD-MCNC: 10.1 G/DL (ref 11.7–15.4)
POTASSIUM SERPL-SCNC: 3.4 MMOL/L (ref 3.5–5.1)
SODIUM SERPL-SCNC: 145 MMOL/L (ref 136–145)

## 2019-03-30 PROCEDURE — 65270000029 HC RM PRIVATE

## 2019-03-30 PROCEDURE — C9113 INJ PANTOPRAZOLE SODIUM, VIA: HCPCS | Performed by: EMERGENCY MEDICINE

## 2019-03-30 PROCEDURE — 80048 BASIC METABOLIC PNL TOTAL CA: CPT

## 2019-03-30 PROCEDURE — 74011250636 HC RX REV CODE- 250/636: Performed by: EMERGENCY MEDICINE

## 2019-03-30 PROCEDURE — 77030020256 HC SOL INJ NACL 0.9%  500ML

## 2019-03-30 PROCEDURE — 85018 HEMOGLOBIN: CPT

## 2019-03-30 PROCEDURE — 74011250636 HC RX REV CODE- 250/636: Performed by: FAMILY MEDICINE

## 2019-03-30 PROCEDURE — 36415 COLL VENOUS BLD VENIPUNCTURE: CPT

## 2019-03-30 PROCEDURE — 77030019605

## 2019-03-30 PROCEDURE — 74011250637 HC RX REV CODE- 250/637: Performed by: INTERNAL MEDICINE

## 2019-03-30 PROCEDURE — 77030020263 HC SOL INJ SOD CL0.9% LFCR 1000ML

## 2019-03-30 RX ORDER — HYDROCODONE BITARTRATE AND ACETAMINOPHEN 5; 325 MG/1; MG/1
1 TABLET ORAL
Status: DISCONTINUED | OUTPATIENT
Start: 2019-03-30 | End: 2019-04-01 | Stop reason: HOSPADM

## 2019-03-30 RX ORDER — POTASSIUM CHLORIDE 20 MEQ/1
40 TABLET, EXTENDED RELEASE ORAL 3 TIMES DAILY
Status: COMPLETED | OUTPATIENT
Start: 2019-03-30 | End: 2019-03-30

## 2019-03-30 RX ADMIN — SUCRALFATE 1 G: 1 TABLET ORAL at 16:32

## 2019-03-30 RX ADMIN — SUCRALFATE 1 G: 1 TABLET ORAL at 11:09

## 2019-03-30 RX ADMIN — ONDANSETRON 4 MG: 2 INJECTION INTRAMUSCULAR; INTRAVENOUS at 16:33

## 2019-03-30 RX ADMIN — Medication 20 ML: at 14:00

## 2019-03-30 RX ADMIN — ONDANSETRON 4 MG: 2 INJECTION INTRAMUSCULAR; INTRAVENOUS at 06:12

## 2019-03-30 RX ADMIN — ONDANSETRON 4 MG: 2 INJECTION INTRAMUSCULAR; INTRAVENOUS at 11:52

## 2019-03-30 RX ADMIN — ONDANSETRON 4 MG: 2 INJECTION INTRAMUSCULAR; INTRAVENOUS at 01:37

## 2019-03-30 RX ADMIN — MORPHINE SULFATE 1 MG: 2 INJECTION, SOLUTION INTRAMUSCULAR; INTRAVENOUS at 18:47

## 2019-03-30 RX ADMIN — MORPHINE SULFATE 1 MG: 2 INJECTION, SOLUTION INTRAMUSCULAR; INTRAVENOUS at 21:28

## 2019-03-30 RX ADMIN — SODIUM CHLORIDE 150 ML/HR: 900 INJECTION, SOLUTION INTRAVENOUS at 08:13

## 2019-03-30 RX ADMIN — Medication 10 ML: at 21:30

## 2019-03-30 RX ADMIN — SODIUM CHLORIDE 80 MG: 900 INJECTION, SOLUTION INTRAVENOUS at 15:35

## 2019-03-30 RX ADMIN — POTASSIUM CHLORIDE 40 MEQ: 20 TABLET, EXTENDED RELEASE ORAL at 16:32

## 2019-03-30 RX ADMIN — MORPHINE SULFATE 1 MG: 2 INJECTION, SOLUTION INTRAMUSCULAR; INTRAVENOUS at 06:12

## 2019-03-30 RX ADMIN — SUCRALFATE 1 G: 1 TABLET ORAL at 06:12

## 2019-03-30 RX ADMIN — HYDROCODONE BITARTRATE AND ACETAMINOPHEN 1 TABLET: 5; 325 TABLET ORAL at 11:52

## 2019-03-30 RX ADMIN — POTASSIUM CHLORIDE 40 MEQ: 20 TABLET, EXTENDED RELEASE ORAL at 21:28

## 2019-03-30 RX ADMIN — SUCRALFATE 1 G: 1 TABLET ORAL at 21:28

## 2019-03-30 RX ADMIN — MORPHINE SULFATE 1 MG: 2 INJECTION, SOLUTION INTRAMUSCULAR; INTRAVENOUS at 16:33

## 2019-03-30 RX ADMIN — MORPHINE SULFATE 1 MG: 2 INJECTION, SOLUTION INTRAMUSCULAR; INTRAVENOUS at 08:45

## 2019-03-30 RX ADMIN — Medication 10 ML: at 06:12

## 2019-03-30 RX ADMIN — MORPHINE SULFATE 1 MG: 2 INJECTION, SOLUTION INTRAMUSCULAR; INTRAVENOUS at 01:37

## 2019-03-30 NOTE — PROGRESS NOTES
Patient resting in bed watching television, received prn pain medication Morphine, assist to bathroom, received assist with ADL, no distress noted.

## 2019-03-30 NOTE — PROGRESS NOTES
Patient diet changed from full liquid to GI soft diet, pt received GI soft diet lunch tray no discomfort noted.

## 2019-03-30 NOTE — PROGRESS NOTES
Hospitalist Progress Note Patient: Elda Woo MRN: [id number]  SSN: [id number] YOB: 1965  Age: 46 y.o. Sex: female Admit Date: 3/27/2019 LOS: 1 day Subjective:  
 
46year old CF with a PMH of PUD/GERD presented to the ER on 3/28/19 with epigastric pain, coffee ground emesis, and melena. She went for EGD on 3/28 and a non-bleeding prepyloric ulcer was found. She was started on a Pantoprazole drip. 3/30 - She feels better today. Small black stool overnight. Requesting solid food. Review of systems negative except stated above. Objective:  
 
Visit Vitals /61 Pulse 63 Temp 98.2 °F (36.8 °C) Resp 19 Ht 5' 3\" (1.6 m) Wt 53.8 kg (118 lb 11.2 oz) SpO2 91% Breastfeeding? No  
BMI 21.03 kg/m² Oxygen Therapy O2 Sat (%): 91 % (03/30/19 0750) Pulse via Oximetry: 84 beats per minute (03/28/19 1631) O2 Device: Room air (03/29/19 1939) O2 Flow Rate (L/min): 2 l/min (03/28/19 1931) Intake and Output:  
 
Intake/Output Summary (Last 24 hours) at 3/30/2019 1103 Last data filed at 3/29/2019 1911 Gross per 24 hour Intake 2580 ml Output  Net 2580 ml Physical Exam:  
GENERAL: alert, cooperative, no distress, appears stated age EYE: conjunctivae/corneas clear. PERRL. THROAT & NECK: normal and no erythema or exudates noted. LUNG: clear to auscultation bilaterally HEART: regular rate and rhythm, S1S2, no murmur, no JVD ABDOMEN: soft, epigastric tender, non-distended. Bowel sounds normal.  
EXTREMITIES:  No edema, 2+ pedal/radial pulses bilaterally SKIN: no rash or abnormalities NEUROLOGIC: A&Ox3. Cranial nerves 2-12 grossly intact. Lab/Data Review: 
Recent Results (from the past 24 hour(s)) HGB & HCT Collection Time: 03/29/19  2:05 PM  
Result Value Ref Range HGB 10.2 (L) 11.7 - 15.4 g/dL HCT 33.0 (L) 35.8 - 46.3 % HGB & HCT Collection Time: 03/29/19  8:59 PM  
Result Value Ref Range HGB 9.8 (L) 11.7 - 15.4 g/dL HCT 31.0 (L) 35.8 - 46.3 % METABOLIC PANEL, BASIC Collection Time: 03/30/19  4:01 AM  
Result Value Ref Range Sodium 145 136 - 145 mmol/L Potassium 3.4 (L) 3.5 - 5.1 mmol/L Chloride 114 (H) 98 - 107 mmol/L  
 CO2 25 21 - 32 mmol/L Anion gap 6 (L) 7 - 16 mmol/L Glucose 79 65 - 100 mg/dL BUN 4 (L) 6 - 23 MG/DL Creatinine 0.38 (L) 0.6 - 1.0 MG/DL  
 GFR est AA >60 >60 ml/min/1.73m2 GFR est non-AA >60 >60 ml/min/1.73m2 Calcium 7.4 (L) 8.3 - 10.4 MG/DL  
HGB & HCT Collection Time: 03/30/19  4:01 AM  
Result Value Ref Range HGB 10.1 (L) 11.7 - 15.4 g/dL HCT 32.8 (L) 35.8 - 46.3 % Imaging: 
Ct Abd Pelv W Cont Result Date: 3/28/2019 CT abdomen and pelvis with contrast COMPARISON: July 31, 2018. INDICATION: Black stools. Vomiting. Abdominal pain. TECHNIQUE: CT imaging was performed of the abdomen and pelvis following the uncomplicated administration of intravenous contrast (Isovue 370, 100 mL). Oral contrast was administered. Radiation dose reduction techniques were used for this study:  Our CT scanners use one or all of the following: Automated exposure control, adjustment of the mA and/or kVp according to patient's size, iterative reconstruction. FINDINGS: Minimal dependent subsegmental atelectasis at the lung bases. Abdomen findings: Gallbladder is surgically absent. The liver, pancreas, and the spleen are unremarkable. Abdominal aorta is normal in course and caliber with mild atherosclerotic calcifications. Right kidney is atrophic with multiple areas of cortical scarring. There is a stable 2 cm cyst in the right kidney. There is a tiny nonobstructing left renal calculus. No hydronephrosis. There are small bilateral adrenal adenomas, similar dating back to 2009 study. There is no evidence of adenopathy. There is wall thickening of the gastric pylorus and surrounding inflammatory changes. Stomach is otherwise normal in contour. Small bowel loops are normal in caliber. No small bowel obstruction. Pelvic findings: Urinary bladder is underdistended. There is sigmoid diverticulosis without diverticulitis. Colon is normal in course and caliber. Appendix is normal. There is no free air or free fluid. IMPRESSION: 1. Wall thickening of the gastric pylorus and surrounding inflammatory stranding. Differential diagnosis includes peptic ulcer disease or gastritis. No evidence of bowel perforation. 2. Sigmoid diverticulosis. Negative for diverticulitis, colitis or appendicitis. No bowel obstruction or hydronephrosis. No results found for this visit on 03/27/19. Cultures: All Micro Results None Assessment/Plan:  
 
Principal Problem: 
  Acute blood loss anemia (3/29/2019) - Hgb stable at 10.1 today - Check H/H Q12H 
- Monitor for signs of loss - GI following Active Problems: 
  Melena (12/18/2018) - Likely due to gastric ulcer - Still with some melena overnight Peptic ulcer (3/29/2019) 
- Continue Pantoprazole drip 
- GI following Acute epigastric pain (12/18/2018) - Due to gastric ulcer - Improved GI bleed (3/28/2019) - Due to gastric ulcer - Continue Pantoprazole drip - Check H/H Q12H 
- GI following Hypotension due to hypovolemia (3/29/2019) - Due to hypovolemia from GIB 
- Resolved with IVFs Leukocytosis (12/18/2018) - Reactive - Resolved Sinus tachycardia (12/18/2018) - Resolved GERD (gastroesophageal reflux disease) (12/18/2018) - Continue PPI drip Dispo - Continue Pantoprazole drip - per GI. Hopefully home in next 1-2 days. DIET CLEAR LIQUID DVT Prophylaxis: SCDs Signed By: Josefa Powell, DO March 30, 2019

## 2019-03-30 NOTE — PROGRESS NOTES
PRN Morphine and Zofran given for pain and nausea. Patient reports two dark BMs overnight. Patient stable on room air resting in bed. Bedside shift report to be given to oncoming RN.

## 2019-03-30 NOTE — PROGRESS NOTES
End of shift SBAR given to oncoming nurse, pt received prn pain med morphine 1mg, pt resting in bed watching television.

## 2019-03-30 NOTE — PROGRESS NOTES
Patient resting in bed, received prn pain med Norco 5/325, received prn Zofran for nausea, D/C NS IV fluid per MD, no distress noted.

## 2019-03-30 NOTE — PROGRESS NOTES
GI DAILY PROGRESS NOTE Admit Date: 3/27/2019 CC: epigastric pain Subjective:  
 
Patient having flatus and some small black stools. Hgb is 10.1 stable. ROS: 
Objective:  
Vitals: 
Visit Vitals /61 Pulse 63 Temp 98.2 °F (36.8 °C) Resp 19 Ht 5' 3\" (1.6 m) Wt 53.8 kg (118 lb 11.2 oz) SpO2 91% Breastfeeding? No  
BMI 21.03 kg/m² Exam: 
General appearance: alert, cooperative, no distress Lungs: clear to auscultation bilaterally Heart: regular rate and rhythm Abdomen: soft, + epigastric tenderness. Bowel sounds normal. No masses,  no organomegaly Extremities: extremities normal, atraumatic, no cyanosis or edema Data Review (Labs):   
Recent Labs  
  03/30/19 
0401 03/29/19 
2059 03/29/19 
1405 03/29/19 
0409 03/28/19 
1625 03/28/19 
1218 03/28/19 
1140 03/28/19 
6072 03/28/19 
0008 WBC  --   --   --   --   --  6.6 4.6  --  13.7* HGB 10.1* 9.8* 10.2* 10.2* 10.7* 11.1* 11.1* 10.6* 14.1 MCV  --   --   --   --   --  92.8 90.9  --  90.1 PLT  --   --   --   --   --  159 176  --  362   --   --  143  --   --   --   --  134* K 3.4*  --   --  3.6  --   --   --   --  3.5 CREA 0.38*  --   --  0.41*  --   --   --   --  1.08* BUN 4*  --   --  15  --   --   --   --  45* ALB  --   --   --   --   --   --   --   --  4.5 TBILI  --   --   --   --   --   --   --   --  0.5 SGOT  --   --   --   --   --   --   --   --  11* ALT  --   --   --   --   --   --   --   --  13  
AP  --   --   --   --   --   --   --   --  106 LPSE  --   --   --   --   --   --   --   --  197 INR  --   --   --   --   --   --   --   --  1.0 Assessment:  
Principal Problem: 
  Acute blood loss anemia (3/29/2019) Active Problems: 
  Melena (12/18/2018) Acute epigastric pain (12/18/2018) Leukocytosis (12/18/2018) Sinus tachycardia (12/18/2018) GERD (gastroesophageal reflux disease) (12/18/2018) GI bleed (3/28/2019) Peptic ulcer (3/29/2019) Hypotension due to hypovolemia (3/29/2019) 45 yo female with PMH of arthritis, GERD, HH, kidney stones, migraines, PUD, OA, who is seen in consultation 28 March 2019 at the request of Dr. Rutledge for for upper GI endoscopy for upper GI bleeding, who was seen for similar symptoms in Dec 2018 with PUD noted on EGD.  She has been on Protonix, Carafate as outpatient, and presented with recurrent nausea, vomiting, coffee ground emesis, and black stool.  Her hgb was 14.1 in the ER, decreasing to 10.6, and was 11.1 on recheck.  She has received 1 unit of PRBCs.  On exam by ER MD, she was noted to have brown stool, guaiac negative.  CT scan noted wall thickening of the gastric pylorus and surrounding inflammatory stranding.  No evidence of bowel perforation.  She is a smoker. EGD 3/28 with prepyloric ulcer, path pending and pyloric stenosis. Plan: F/U gastric ulcer bx pathology Start full liquids Repeat EGD in one to two months post treatment. Home ? Next 1 to 2 days if stable.  
 
Sonali Carbajal MD

## 2019-03-31 LAB
ANION GAP SERPL CALC-SCNC: 5 MMOL/L (ref 7–16)
BUN SERPL-MCNC: 5 MG/DL (ref 6–23)
CALCIUM SERPL-MCNC: 8.2 MG/DL (ref 8.3–10.4)
CHLORIDE SERPL-SCNC: 114 MMOL/L (ref 98–107)
CO2 SERPL-SCNC: 24 MMOL/L (ref 21–32)
CREAT SERPL-MCNC: 0.49 MG/DL (ref 0.6–1)
GLUCOSE SERPL-MCNC: 88 MG/DL (ref 65–100)
HCT VFR BLD AUTO: 32.6 % (ref 35.8–46.3)
HGB BLD-MCNC: 10.1 G/DL (ref 11.7–15.4)
POTASSIUM SERPL-SCNC: 4.6 MMOL/L (ref 3.5–5.1)
SODIUM SERPL-SCNC: 143 MMOL/L (ref 136–145)

## 2019-03-31 PROCEDURE — C9113 INJ PANTOPRAZOLE SODIUM, VIA: HCPCS | Performed by: EMERGENCY MEDICINE

## 2019-03-31 PROCEDURE — 74011250637 HC RX REV CODE- 250/637: Performed by: INTERNAL MEDICINE

## 2019-03-31 PROCEDURE — 85018 HEMOGLOBIN: CPT

## 2019-03-31 PROCEDURE — 80048 BASIC METABOLIC PNL TOTAL CA: CPT

## 2019-03-31 PROCEDURE — 74011250636 HC RX REV CODE- 250/636: Performed by: EMERGENCY MEDICINE

## 2019-03-31 PROCEDURE — 74011250636 HC RX REV CODE- 250/636: Performed by: FAMILY MEDICINE

## 2019-03-31 PROCEDURE — 65270000029 HC RM PRIVATE

## 2019-03-31 PROCEDURE — 36415 COLL VENOUS BLD VENIPUNCTURE: CPT

## 2019-03-31 PROCEDURE — 94760 N-INVAS EAR/PLS OXIMETRY 1: CPT

## 2019-03-31 RX ORDER — PANTOPRAZOLE SODIUM 40 MG/1
40 TABLET, DELAYED RELEASE ORAL
Status: DISCONTINUED | OUTPATIENT
Start: 2019-03-31 | End: 2019-04-01 | Stop reason: HOSPADM

## 2019-03-31 RX ADMIN — SUCRALFATE 1 G: 1 TABLET ORAL at 21:26

## 2019-03-31 RX ADMIN — SUCRALFATE 1 G: 1 TABLET ORAL at 11:09

## 2019-03-31 RX ADMIN — ONDANSETRON 4 MG: 2 INJECTION INTRAMUSCULAR; INTRAVENOUS at 00:35

## 2019-03-31 RX ADMIN — SODIUM CHLORIDE 80 MG: 900 INJECTION, SOLUTION INTRAVENOUS at 00:41

## 2019-03-31 RX ADMIN — MORPHINE SULFATE 1 MG: 2 INJECTION, SOLUTION INTRAMUSCULAR; INTRAVENOUS at 20:18

## 2019-03-31 RX ADMIN — Medication 10 ML: at 05:26

## 2019-03-31 RX ADMIN — MORPHINE SULFATE 1 MG: 2 INJECTION, SOLUTION INTRAMUSCULAR; INTRAVENOUS at 14:24

## 2019-03-31 RX ADMIN — ONDANSETRON 4 MG: 2 INJECTION INTRAMUSCULAR; INTRAVENOUS at 20:18

## 2019-03-31 RX ADMIN — SUCRALFATE 1 G: 1 TABLET ORAL at 16:43

## 2019-03-31 RX ADMIN — SODIUM CHLORIDE 80 MG: 900 INJECTION, SOLUTION INTRAVENOUS at 12:19

## 2019-03-31 RX ADMIN — MORPHINE SULFATE 1 MG: 2 INJECTION, SOLUTION INTRAMUSCULAR; INTRAVENOUS at 09:08

## 2019-03-31 RX ADMIN — ONDANSETRON 4 MG: 2 INJECTION INTRAMUSCULAR; INTRAVENOUS at 09:08

## 2019-03-31 RX ADMIN — SUCRALFATE 1 G: 1 TABLET ORAL at 05:25

## 2019-03-31 RX ADMIN — HYDROCODONE BITARTRATE AND ACETAMINOPHEN 1 TABLET: 5; 325 TABLET ORAL at 18:24

## 2019-03-31 RX ADMIN — Medication 10 ML: at 20:18

## 2019-03-31 RX ADMIN — PANTOPRAZOLE SODIUM 40 MG: 40 TABLET, DELAYED RELEASE ORAL at 16:43

## 2019-03-31 RX ADMIN — Medication 20 ML: at 14:27

## 2019-03-31 RX ADMIN — ONDANSETRON 4 MG: 2 INJECTION INTRAMUSCULAR; INTRAVENOUS at 14:24

## 2019-03-31 RX ADMIN — MORPHINE SULFATE 1 MG: 2 INJECTION, SOLUTION INTRAMUSCULAR; INTRAVENOUS at 04:14

## 2019-03-31 RX ADMIN — MORPHINE SULFATE 1 MG: 2 INJECTION, SOLUTION INTRAMUSCULAR; INTRAVENOUS at 00:34

## 2019-03-31 RX ADMIN — Medication 10 ML: at 21:26

## 2019-03-31 NOTE — PROGRESS NOTES
GI DAILY PROGRESS NOTE Admit Date: 3/27/2019 CC: Epigastric pain Subjective:  
 
Patient has less dark liquid stools. Hgb stable 10.1. Some epigastric pain. Ulcer bx pending. On gi soft diet. ROS: 
Objective:  
Vitals: 
Visit Vitals /77 Pulse 60 Temp 98.3 °F (36.8 °C) Resp 19 Ht 5' 3\" (1.6 m) Wt 53.8 kg (118 lb 11.2 oz) SpO2 95% Breastfeeding? No  
BMI 21.03 kg/m² Exam: 
General appearance: alert, cooperative, no distress Lungs: clear to auscultation bilaterally Heart: regular rate and rhythm Abdomen: soft, mild epigastric tenderness. Bowel sounds normal. No masses,  no organomegaly Extremities: extremities normal, atraumatic, no cyanosis or edema Data Review (Labs):   
Recent Labs  
  03/31/19 
0420 03/30/19 
1531 03/30/19 
0401 03/29/19 
2059 03/29/19 
1405 03/29/19 
0409 03/28/19 
1625 03/28/19 
1218 03/28/19 
1140 WBC  --   --   --   --   --   --   --  6.6 4.6 HGB 10.1* 10.0* 10.1* 9.8* 10.2* 10.2* 10.7* 11.1* 11.1*  
MCV  --   --   --   --   --   --   --  92.8 90.9 PLT  --   --   --   --   --   --   --  159 176   --  145  --   --  143  --   --   --   
K 4.6  --  3.4*  --   --  3.6  --   --   --   
CREA 0.49*  --  0.38*  --   --  0.41*  --   --   --   
BUN 5*  --  4*  --   --  15  --   --   --   
 
 
Assessment:  
Principal Problem: 
  Acute blood loss anemia (3/29/2019) Active Problems: 
  Melena (12/18/2018) Acute epigastric pain (12/18/2018) Leukocytosis (12/18/2018) Sinus tachycardia (12/18/2018) GERD (gastroesophageal reflux disease) (12/18/2018) GI bleed (3/28/2019) Peptic ulcer (3/29/2019) Hypotension due to hypovolemia (3/29/2019) 45 yo female with PMH of arthritis, GERD, HH, kidney stones, migraines, PUD, OA, who is seen in consultation 28 March 2019 at the request of Dr. Rutledge for for upper GI endoscopy for upper GI bleeding, who was seen for similar symptoms in Dec 2018 with PUD noted on EGD.  She has been on Protonix, Carafate as outpatient, and presented with recurrent nausea, vomiting, coffee ground emesis, and black stool.  Her hgb was 14.1 in the ER, decreasing to 10.6, and was 11.1 on recheck.  She has received 1 unit of PRBCs.  On exam by ER MD, she was noted to have brown stool, guaiac negative.  CT scan noted wall thickening of the gastric pylorus and surrounding inflammatory stranding.  No evidence of bowel perforation.  She is a smoker. EGD 3/28 with prepyloric ulcer, path pending and pyloric stenosis. Plan: PPIs to be continued for several months Needs repeat EGD in 1 to 2 months. F/U ulcer bx We will s/o and make arrangements for F/U Mildred Alegre MD

## 2019-03-31 NOTE — PROGRESS NOTES
Patient received prn pain meds morphine 1mg and Zofran for nausea received assist with ADL no distress noted.

## 2019-03-31 NOTE — PROGRESS NOTES
Bedside report received from STACY Be. Assessment completed. Bed is in low and locked position with floor free of objects. Patient AxOx3, and resting quietly in bed. No needs noted at present. Respirations even and non labored. Verbalized understanding to call for needs. Call light within reach. Will continue to monitor pt.

## 2019-03-31 NOTE — PROGRESS NOTES
Hospitalist Progress Note Patient: Valente Ortega MRN: [id number]  SSN: [id number] YOB: 1965  Age: 46 y.o. Sex: female Admit Date: 3/27/2019 LOS: 2 days Subjective:  
 
46year old CF with a PMH of PUD/GERD presented to the ER on 3/28/19 with epigastric pain, coffee ground emesis, and melena. She went for EGD on 3/28 and a non-bleeding prepyloric ulcer was found. She was started on a Pantoprazole drip. 3/31 - Melena has resolved. Now having some watery diarrhea with associated burning. Denies abdominal pain. Review of systems negative except stated above. Objective:  
 
Visit Vitals /77 Pulse 60 Temp 98.3 °F (36.8 °C) Resp 19 Ht 5' 3\" (1.6 m) Wt 53.8 kg (118 lb 11.2 oz) SpO2 95% Breastfeeding? No  
BMI 21.03 kg/m² Oxygen Therapy O2 Sat (%): 95 % (03/31/19 0737) Pulse via Oximetry: 84 beats per minute (03/28/19 1631) O2 Device: Room air (03/29/19 1939) O2 Flow Rate (L/min): 2 l/min (03/28/19 1931) Intake and Output:  
 
Intake/Output Summary (Last 24 hours) at 3/31/2019 3425 Last data filed at 3/31/2019 0858 Gross per 24 hour Intake 2130 ml Output  Net 2130 ml Physical Exam:  
GENERAL: alert, cooperative, no distress, appears stated age EYE: conjunctivae/corneas clear. PERRL. THROAT & NECK: normal and no erythema or exudates noted. LUNG: clear to auscultation bilaterally HEART: regular rate and rhythm, S1S2, no murmur, no JVD ABDOMEN: soft, epigastric tender, non-distended. Bowel sounds normal.  
EXTREMITIES:  No edema, 2+ pedal/radial pulses bilaterally SKIN: no rash or abnormalities NEUROLOGIC: A&Ox3. Cranial nerves 2-12 grossly intact. Lab/Data Review: 
Recent Results (from the past 24 hour(s)) HGB & HCT Collection Time: 03/30/19  3:31 PM  
Result Value Ref Range HGB 10.0 (L) 11.7 - 15.4 g/dL HCT 32.5 (L) 35.8 - 46.3 % HGB & HCT  Collection Time: 03/31/19  4:20 AM  
 Result Value Ref Range HGB 10.1 (L) 11.7 - 15.4 g/dL HCT 32.6 (L) 35.8 - 46.3 % METABOLIC PANEL, BASIC Collection Time: 03/31/19  4:20 AM  
Result Value Ref Range Sodium 143 136 - 145 mmol/L Potassium 4.6 3.5 - 5.1 mmol/L Chloride 114 (H) 98 - 107 mmol/L  
 CO2 24 21 - 32 mmol/L Anion gap 5 (L) 7 - 16 mmol/L Glucose 88 65 - 100 mg/dL BUN 5 (L) 6 - 23 MG/DL Creatinine 0.49 (L) 0.6 - 1.0 MG/DL  
 GFR est AA >60 >60 ml/min/1.73m2 GFR est non-AA >60 >60 ml/min/1.73m2 Calcium 8.2 (L) 8.3 - 10.4 MG/DL Imaging: 
Ct Abd Pelv W Cont Result Date: 3/28/2019 CT abdomen and pelvis with contrast COMPARISON: July 31, 2018. INDICATION: Black stools. Vomiting. Abdominal pain. TECHNIQUE: CT imaging was performed of the abdomen and pelvis following the uncomplicated administration of intravenous contrast (Isovue 370, 100 mL). Oral contrast was administered. Radiation dose reduction techniques were used for this study:  Our CT scanners use one or all of the following: Automated exposure control, adjustment of the mA and/or kVp according to patient's size, iterative reconstruction. FINDINGS: Minimal dependent subsegmental atelectasis at the lung bases. Abdomen findings: Gallbladder is surgically absent. The liver, pancreas, and the spleen are unremarkable. Abdominal aorta is normal in course and caliber with mild atherosclerotic calcifications. Right kidney is atrophic with multiple areas of cortical scarring. There is a stable 2 cm cyst in the right kidney. There is a tiny nonobstructing left renal calculus. No hydronephrosis. There are small bilateral adrenal adenomas, similar dating back to 2009 study. There is no evidence of adenopathy. There is wall thickening of the gastric pylorus and surrounding inflammatory changes. Stomach is otherwise normal in contour. Small bowel loops are normal in caliber. No small bowel obstruction.  Pelvic findings: Urinary bladder is underdistended. There is sigmoid diverticulosis without diverticulitis. Colon is normal in course and caliber. Appendix is normal. There is no free air or free fluid. IMPRESSION: 1. Wall thickening of the gastric pylorus and surrounding inflammatory stranding. Differential diagnosis includes peptic ulcer disease or gastritis. No evidence of bowel perforation. 2. Sigmoid diverticulosis. Negative for diverticulitis, colitis or appendicitis. No bowel obstruction or hydronephrosis. No results found for this visit on 03/27/19. Cultures: All Micro Results None Assessment/Plan:  
 
Principal Problem: 
  Acute blood loss anemia (3/29/2019) - Hgb stable - Check H/H daily - Monitor for signs of loss - GI following Active Problems: 
  Melena (12/18/2018) - Resolved - Likely due to gastric ulcer - Still with some melena overnight Peptic ulcer (3/29/2019) 
- Continue Pantoprazole drip --> transition to PO BID? 
- GI following Acute epigastric pain (12/18/2018) - Due to gastric ulcer - Improved GI bleed (3/28/2019) - Due to gastric ulcer - Continue Pantoprazole drip --> transition to PO BID? 
- Check H/H daily - GI following Hypotension due to hypovolemia (3/29/2019) - Due to hypovolemia from GIB 
- Resolved with IVFs Leukocytosis (12/18/2018) - Reactive - Resolved Sinus tachycardia (12/18/2018) - Resolved GERD (gastroesophageal reflux disease) (12/18/2018) - Continue PPI drip --> transition to PO BID? Dispo - Continue Pantoprazole drip - per GI. Hopefully home in next 1-2 days. DIET GI SOFT No options chosen DVT Prophylaxis: SCDs Signed By: Lizabeth Torres DO March 31, 2019

## 2019-03-31 NOTE — PROGRESS NOTES
Pt resting in bed watching television, received AM med po and  continuous protonix  80mg as ordered, calm and pleasant no discomfort noted.

## 2019-04-01 VITALS
RESPIRATION RATE: 19 BRPM | HEART RATE: 60 BPM | BODY MASS INDEX: 21.03 KG/M2 | TEMPERATURE: 98 F | HEIGHT: 63 IN | OXYGEN SATURATION: 93 % | WEIGHT: 118.7 LBS | DIASTOLIC BLOOD PRESSURE: 72 MMHG | SYSTOLIC BLOOD PRESSURE: 130 MMHG

## 2019-04-01 LAB
ANION GAP SERPL CALC-SCNC: 6 MMOL/L (ref 7–16)
BUN SERPL-MCNC: 7 MG/DL (ref 6–23)
CALCIUM SERPL-MCNC: 8.3 MG/DL (ref 8.3–10.4)
CHLORIDE SERPL-SCNC: 109 MMOL/L (ref 98–107)
CO2 SERPL-SCNC: 28 MMOL/L (ref 21–32)
CREAT SERPL-MCNC: 0.48 MG/DL (ref 0.6–1)
GLUCOSE SERPL-MCNC: 82 MG/DL (ref 65–100)
HCT VFR BLD AUTO: 34.1 % (ref 35.8–46.3)
HGB BLD-MCNC: 10.7 G/DL (ref 11.7–15.4)
POTASSIUM SERPL-SCNC: 4 MMOL/L (ref 3.5–5.1)
SODIUM SERPL-SCNC: 143 MMOL/L (ref 136–145)

## 2019-04-01 PROCEDURE — 74011250636 HC RX REV CODE- 250/636: Performed by: FAMILY MEDICINE

## 2019-04-01 PROCEDURE — 74011250637 HC RX REV CODE- 250/637: Performed by: INTERNAL MEDICINE

## 2019-04-01 PROCEDURE — 85018 HEMOGLOBIN: CPT

## 2019-04-01 PROCEDURE — 36415 COLL VENOUS BLD VENIPUNCTURE: CPT

## 2019-04-01 PROCEDURE — 80048 BASIC METABOLIC PNL TOTAL CA: CPT

## 2019-04-01 RX ORDER — PANTOPRAZOLE SODIUM 40 MG/1
40 TABLET, DELAYED RELEASE ORAL
Qty: 60 TAB | Refills: 2 | Status: SHIPPED | OUTPATIENT
Start: 2019-04-01

## 2019-04-01 RX ORDER — SUCRALFATE 1 G/1
1 TABLET ORAL 4 TIMES DAILY
Qty: 120 TAB | Refills: 1 | Status: SHIPPED | OUTPATIENT
Start: 2019-04-01

## 2019-04-01 RX ADMIN — ONDANSETRON 4 MG: 2 INJECTION INTRAMUSCULAR; INTRAVENOUS at 03:48

## 2019-04-01 RX ADMIN — MORPHINE SULFATE 1 MG: 2 INJECTION, SOLUTION INTRAMUSCULAR; INTRAVENOUS at 00:02

## 2019-04-01 RX ADMIN — PANTOPRAZOLE SODIUM 40 MG: 40 TABLET, DELAYED RELEASE ORAL at 06:02

## 2019-04-01 RX ADMIN — ONDANSETRON 4 MG: 2 INJECTION INTRAMUSCULAR; INTRAVENOUS at 08:46

## 2019-04-01 RX ADMIN — SUCRALFATE 1 G: 1 TABLET ORAL at 06:02

## 2019-04-01 RX ADMIN — MORPHINE SULFATE 1 MG: 2 INJECTION, SOLUTION INTRAMUSCULAR; INTRAVENOUS at 08:46

## 2019-04-01 RX ADMIN — MORPHINE SULFATE 1 MG: 2 INJECTION, SOLUTION INTRAMUSCULAR; INTRAVENOUS at 06:09

## 2019-04-01 RX ADMIN — Medication 10 ML: at 06:02

## 2019-04-01 RX ADMIN — MORPHINE SULFATE 1 MG: 2 INJECTION, SOLUTION INTRAMUSCULAR; INTRAVENOUS at 03:48

## 2019-04-01 RX ADMIN — Medication 10 ML: at 00:03

## 2019-04-01 NOTE — PROGRESS NOTES
Discharge instructions and prescriptions provided and explained to the pt. Medication information sheets for all new prescriptions given and reviewed with patient. Opportunity for questions provided. Instructed to call once ready to leave.

## 2019-04-01 NOTE — DISCHARGE SUMMARY
Hospitalist Discharge Summary     Patient ID:  Eli Weems  [id number]  51 y.o.  1965  Admit date: 3/27/2019 11:58 PM  Discharge date and time: 4/1/2019  Attending: Jose Mcdonnell DO  PCP:  None  Treatment Team: Attending Provider: Jose Mcdonnell DO; Utilization Review: Iqra Chi RN    Principal Diagnosis Acute blood loss anemia    Hospital Problems as of 4/1/2019 Date Reviewed: 11/21/2017          Codes Class Noted - Resolved POA    Peptic ulcer ICD-10-CM: K27.9  ICD-9-CM: 533.90  3/29/2019 - Present Yes        * (Principal) Acute blood loss anemia ICD-10-CM: D62  ICD-9-CM: 285.1  3/29/2019 - Present Yes        Melena ICD-10-CM: K92.1  ICD-9-CM: 578.1  12/18/2018 - Present Yes        Hypotension due to hypovolemia ICD-10-CM: I95.89, E86.1  ICD-9-CM: 458.8, 276.52  3/29/2019 - Present Yes        GI bleed ICD-10-CM: K92.2  ICD-9-CM: 578.9  3/28/2019 - Present Yes        Acute epigastric pain ICD-10-CM: R10.13  ICD-9-CM: 789.06, 338.19  12/18/2018 - Present Yes        Leukocytosis ICD-10-CM: D72.829  ICD-9-CM: 288.60  12/18/2018 - Present Yes        Sinus tachycardia ICD-10-CM: R00.0  ICD-9-CM: 427.89  12/18/2018 - Present Unknown        GERD (gastroesophageal reflux disease) ICD-10-CM: K21.9  ICD-9-CM: 530.81  12/18/2018 - Present Yes                  Hospital Course:  46year old CF with a PMH of PUD/GERD presented to the ER on 3/28/19 with epigastric pain, coffee ground emesis, and melena. She went for EGD on 3/28 and a non-bleeding prepyloric ulcer was found, which was biopsied. She was started on a Pantoprazole drip. She passed melena, but her Hgb remained stable. She continued to have some nausea resolved with Zofran. She was transitioned to BID Pantoprazole and Carafate, which she will continued indefinitely until GI states the change. She was discharged home in stable condition.     Significant Diagnostic Studies:    Labs: Results:       Chemistry Recent Labs 04/01/19  0500 03/31/19  0420 03/30/19  0401   GLU 82 88 79    143 145   K 4.0 4.6 3.4*   * 114* 114*   CO2 28 24 25   BUN 7 5* 4*   CREA 0.48* 0.49* 0.38*   CA 8.3 8.2* 7.4*   AGAP 6* 5* 6*      CBC w/Diff Recent Labs     04/01/19  0500 03/31/19  0420 03/30/19  1531   HGB 10.7* 10.1* 10.0*   HCT 34.1* 32.6* 32.5*      Cardiac Enzymes No results for input(s): CPK, CKND1, SERINA in the last 72 hours. No lab exists for component: CKRMB, TROIP   Coagulation No results for input(s): PTP, INR, APTT in the last 72 hours. No lab exists for component: INREXT    Lipid Panel No results found for: CHOL, CHOLPOCT, CHOLX, CHLST, CHOLV, 123180, HDL, LDL, LDLC, DLDLP, 577304, VLDLC, VLDL, TGLX, TRIGL, TRIGP, TGLPOCT, CHHD, CHHDX   BNP No results for input(s): BNPP in the last 72 hours. Liver Enzymes No results for input(s): TP, ALB, TBIL, AP, SGOT, GPT in the last 72 hours. No lab exists for component: DBIL   Thyroid Studies No results found for: T4, T3U, TSH, TSHEXT         Imaging:  Ct Abd Pelv W Cont    Result Date: 3/28/2019  IMPRESSION: 1. Wall thickening of the gastric pylorus and surrounding inflammatory stranding. Differential diagnosis includes peptic ulcer disease or gastritis. No evidence of bowel perforation. 2. Sigmoid diverticulosis. Negative for diverticulitis, colitis or appendicitis. No bowel obstruction or hydronephrosis. Microbiology/Cultures: All Micro Results     None          Discharge Exam:  Visit Vitals  /72   Pulse 60   Temp 98 °F (36.7 °C)   Resp 19   Ht 5' 3\" (1.6 m)   Wt 53.8 kg (118 lb 11.2 oz)   SpO2 93%   Breastfeeding? No   BMI 21.03 kg/m²     General appearance: alert, cooperative, no distress, appears stated age  Lungs: clear to auscultation bilaterally  Heart: regular rate and rhythm, S1, S2 normal, no murmur, click, rub or gallop  Abdomen: soft, non-tender.  Bowel sounds normal. No masses,  no organomegaly  Extremities: no cyanosis or edema  Neurologic: Grossly normal    Disposition: home  Discharge Condition: stable  Patient Instructions:   Current Discharge Medication List      CONTINUE these medications which have CHANGED    Details   pantoprazole (PROTONIX) 40 mg tablet Take 1 Tab by mouth Before breakfast and dinner. Qty: 60 Tab, Refills: 2      sucralfate (CARAFATE) 1 gram tablet Take 1 Tab by mouth four (4) times daily. Qty: 120 Tab, Refills: 1         CONTINUE these medications which have NOT CHANGED    Details   HYDROcodone-acetaminophen (NORCO)  mg tablet Take 1 Tab by mouth every eight (8) hours as needed for Pain. Max Daily Amount: 3 Tabs. Qty: 30 Tab, Refills: 0    Associated Diagnoses: Right upper quadrant abdominal pain      ondansetron (ZOFRAN ODT) 4 mg disintegrating tablet Take 1 Tab by mouth every eight (8) hours as needed for Nausea.   Qty: 6 Tab, Refills: 0             Activity: Activity as tolerated  Diet: Regular Diet  Wound Care: None needed    Follow-up  ·   Family doctor in one week  · [facility] at scheduled appt  Time spent to discharge patient 35 minutes  Signed:  David Lucero DO  4/1/2019  7:56 AM

## 2019-04-01 NOTE — DISCHARGE INSTRUCTIONS
Patient Education      Patient Education        Diet for Inflammatory Bowel Disease: Care Instructions  Your Care Instructions    Crohn's disease and ulcerative colitis are types of inflammatory bowel disease. What you eat doesn't increase the inflammation that causes your disease. But some types of foods, such as high-fiber fruits and vegetables, may make your symptoms worse. No one diet is right for everyone with an inflammatory bowel disease. Foods that bother one person may not bother another. Your diet has to be tailored for you. Follow-up care is a key part of your treatment and safety. Be sure to make and go to all appointments, and call your doctor if you are having problems. It's also a good idea to know your test results and keep a list of the medicines you take. How can you care for yourself at home? · Keep a food diary. As soon as you know what foods make your symptoms worse, your doctor or dietitian can help you plan the right diet for you. · During a flare-up, avoid or reduce foods that make symptoms worse. ? Choose dairy products that are low in lactose, such as yogurt, lactose-reduced milk, and hard cheeses like cheddar. ? If you have fat in your stools, choose low-fat foods instead of high-fat ones. For instance, some cuts of red meat have a lot of fat. A low-fat choice would be lean beef (such as sirloin, top and bottom round, jackelyn, or diet lean hamburger), poultry, or fish, such as cod.  ? Instead of frying foods, try baking or broiling them. ? Cook fruits and vegetables without hulls, skins, or seeds. ? Try different ways of preparing fruits and vegetables, such as steaming, stewing, or baking. ? Peel and seed fresh fruits and vegetables if these bother you, or choose canned varieties. · Get the calories and nutrients you need. ? Eat a varied, nutritious diet that is high in calories and protein. ? Try eating 3 meals plus 2 or 3 snacks a day.  It may be easier to get more calories if you spread your food intake throughout the day. ? Take vitamin and mineral supplements if your doctor recommends them. ? Try adding high-calorie liquid supplements, such as Ensure Plus or Boost Plus, if you have trouble keeping your weight up.  ? See your doctor or dietitian if your diet feels too limited or you are losing weight. · Make sure to get enough iron. Rectal bleeding may make you lose iron. Good sources of iron include:  ? Beef. ? Lentils. ? Spinach. ? Raisins. ? Iron-enriched breads and cereals. When should you call for help? Watch closely for changes in your health, and be sure to contact your doctor if you have any problems. Where can you learn more? Go to http://osito-ashley.info/. Enter X002 in the search box to learn more about \"Diet for Inflammatory Bowel Disease: Care Instructions. \"  Current as of: March 28, 2018  Content Version: 11.9  © 4837-9142 [employer]. Care instructions adapted under license by [employer] (which disclaims liability or warranty for this information). If you have questions about a medical condition or this instruction, always ask your healthcare professional. [place] [address] any warranty or liability for your use of this information. Upper Gastrointestinal Bleeding: Care Instructions  Your Care Instructions    The digestive or gastrointestinal tract goes from the mouth to the anus. It is often called the GI tract. Bleeding in the upper GI tract can happen anywhere from the esophagus to the first part of the small intestine. Sometimes it's caused by an ulcer in your stomach. Or it may be caused by blood vessels in your esophagus. Your esophagus is the tube that carries food from your throat to your stomach. Light bleeding may not cause any symptoms at first. But if you continue to bleed for a while, you may feel very weak or tired.   Sudden, heavy bleeding means you need to see a doctor right away. This kind of bleeding can be very dangerous. But it can usually be cured or controlled. The doctor may do some tests to find the cause of your bleeding. Follow-up care is a key part of your treatment and safety. Be sure to make and go to all appointments, and call your doctor if you are having problems. It's also a good idea to know your test results and keep a list of the medicines you take. How can you care for yourself at home? · Be safe with medicines. Take your medicines exactly as prescribed. Call your doctor if you think you are having a problem with your medicine. You will get more details on the specific medicines your doctor prescribes. · Do not take aspirin or other anti-inflammatory medicines, such as naproxen (Aleve) or ibuprofen (Advil, Motrin), without talking to your doctor first. Ask your doctor if it is okay to use acetaminophen (Tylenol). · Do not drink alcohol. · The bleeding may make you lose iron. So it's important to eat foods that have a lot of iron. These include red meat, shellfish, poultry, and eggs. They also include beans, raisins, whole-grain breads, and leafy green vegetables. If you want help planning meals, you can meet with a dietitian. When should you call for help? Call 911 anytime you think you may need emergency care. For example, call if:    · You have sudden, severe belly pain.     · You vomit blood or what looks like coffee grounds.     · You passed out (lost consciousness).     · Your stools are maroon or very bloody.    Call your doctor now or seek immediate medical care if:    · You are dizzy or lightheaded, or you feel like you may faint.     · Your stools are black and look like tar.     · You have belly pain.     · You vomit or have nausea.     · You have trouble swallowing, or it hurts when you swallow.    Watch closely for changes in your health, and be sure to contact your doctor if you do not get better as expected. Where can you learn more?   Go to http://osito-ashley.info/. Enter N231 in the search box to learn more about \"Upper Gastrointestinal Bleeding: Care Instructions. \"  Current as of: September 23, 2018  Content Version: 11.9  © 4318-9726 [employer]. Care instructions adapted under license by [employer] (which disclaims liability or warranty for this information). If you have questions about a medical condition or this instruction, always ask your healthcare professional. [place] [address] any warranty or liability for your use of this information. Patient Education        Peptic Ulcer Disease: Care Instructions  Your Care Instructions    Peptic ulcers are sores on the inside of the stomach or the small intestine (such as a duodenal ulcer). They are most often caused by an infection with bacteria or from use of nonsteroidal anti-inflammatory drugs (NSAIDs). NSAIDs include aspirin, ibuprofen (Advil), and naproxen (Aleve). Your doctor may have prescribed medicine to reduce stomach acid. You also may need to take antibiotics if your peptic ulcers are caused by an infection. You can help yourself heal and keep ulcers from coming back by making some changes in your lifestyle. Quit smoking. Limit alcohol. Follow-up care is a key part of your treatment and safety. Be sure to make and go to all appointments, and call your doctor if you are having problems. It's also a good idea to know your test results and keep a list of the medicines you take. How can you care for yourself at home? · Be safe with medicines. Take your medicines exactly as prescribed. Call your doctor if you think you are having a problem with your medicine. · Do not take aspirin or other NSAIDs such as ibuprofen (Advil or Motrin) or naproxen (Aleve). Ask your doctor what you can take for pain. · Do not smoke. Smoking can make ulcers worse.  If you need help quitting, talk to your doctor about stop-smoking programs and medicines. These can increase your chances of quitting for good. · Drink in moderation or avoid drinking alcohol. · Eat a balanced diet of small, frequent meals. See a dietitian if you need help planning your meals. When should you call for help? [id number] anytime you think you may need emergency care. For example, call if:    · You vomit blood or what looks like coffee grounds.     · You pass maroon or very bloody stools.    Call your doctor now or seek immediate medical care if:    · You have new or worse belly pain.     · Your stools are black and look like tar, or they have streaks of blood.     · You vomit.    Watch closely for changes in your health, and be sure to contact your doctor if:    · You do not get better as expected. Where can you learn more? Go to http://osito-ashley.info/. Enter O721 in the search box to learn more about \"Peptic Ulcer Disease: Care Instructions. \"  Current as of: March 27, 2018  Content Version: 11.9  © 0215-6164 [employer]. Care instructions adapted under license by [employer] (which disclaims liability or warranty for this information). If you have questions about a medical condition or this instruction, always ask your healthcare professional. [place] [address] any warranty or liability for your use of this information. DISCHARGE SUMMARY from Nurse    PATIENT INSTRUCTIONS:    After general anesthesia or intravenous sedation, for 24 hours or while taking prescription Narcotics:  · Limit your activities  · Do not drive and operate hazardous machinery  · Do not make important personal or business decisions  · Do  not drink alcoholic beverages  · If you have not urinated within 8 hours after discharge, please contact your surgeon on call.     Report the following to your surgeon:  · Excessive pain, swelling, redness or odor of or around the surgical area  · Temperature over 100.5  · Nausea and vomiting lasting longer than 4 hours or if unable to take medications  · Any signs of decreased circulation or nerve impairment to extremity: change in color, persistent  numbness, tingling, coldness or increase pain  · Any questions    What to do at Home:      *  Please give a list of your current medications to your Primary Care Provider. *  Please update this list whenever your medications are discontinued, doses are      changed, or new medications (including over-the-counter products) are added. *  Please carry medication information at all times in case of emergency situations. These are general instructions for a healthy lifestyle:    No smoking/ No tobacco products/ Avoid exposure to second hand smoke  Surgeon General's Warning:  Quitting smoking now greatly reduces serious risk to your health. Obesity, smoking, and sedentary lifestyle greatly increases your risk for illness    A healthy diet, regular physical exercise & weight monitoring are important for maintaining a healthy lifestyle    You may be retaining fluid if you have a history of heart failure or if you experience any of the following symptoms:  Weight gain of 3 pounds or more overnight or 5 pounds in a week, increased swelling in our hands or feet or shortness of breath while lying flat in bed. Please call your doctor as soon as you notice any of these symptoms; do not wait until your next office visit. Recognize signs and symptoms of STROKE:    F-face looks uneven    A-arms unable to move or move unevenly    S-speech slurred or non-existent    T-time-call 911 as soon as signs and symptoms begin-DO NOT go       Back to bed or wait to see if you get better-TIME IS BRAIN. Warning Signs of HEART ATTACK     Call 911 if you have these symptoms:   Chest discomfort. Most heart attacks involve discomfort in the center of the chest that lasts more than a few minutes, or that goes away and comes back.  It can feel like uncomfortable pressure, squeezing, fullness, or pain.  Discomfort in other areas of the upper body. Symptoms can include pain or discomfort in one or both arms, the back, neck, jaw, or stomach.  Shortness of breath with or without chest discomfort.  Other signs may include breaking out in a cold sweat, nausea, or lightheadedness. Don't wait more than five minutes to call 911 - MINUTES MATTER! Fast action can save your life. Calling 911 is almost always the fastest way to get lifesaving treatment. Emergency Medical Services staff can begin treatment when they arrive -- up to an hour sooner than if someone gets to the hospital by car. The discharge information has been reviewed with the patient. The patient verbalized understanding. Discharge medications reviewed with the patient and appropriate educational materials and side effects teaching were provided.   ___________________________________________________________________________________________________________________________________

## 2019-04-01 NOTE — PROGRESS NOTES
SBAR shift report received from [facility]. Pt stable. Assessment complete. Pt is lying in bed, watching tv. Resp even, unlabored. Pt is alert, orient X 4. Pt appears in no acute distress at this time. Pt is on RA. Pt denies vomiting or loose stool. Pt c/o abdominal pain and nausea. Pt encouraged to call for assistance, call light in reach. Safety measures in place. Will continue to monitor

## 2019-04-01 NOTE — PROGRESS NOTES
Pt discharged from hospital in stable condition. Pt left in wheelchair with belongings transported via CNA.

## 2019-05-13 ENCOUNTER — APPOINTMENT (OUTPATIENT)
Dept: GENERAL RADIOLOGY | Age: 54
End: 2019-05-13
Attending: EMERGENCY MEDICINE
Payer: SUBSIDIZED

## 2019-05-13 ENCOUNTER — APPOINTMENT (OUTPATIENT)
Dept: CT IMAGING | Age: 54
End: 2019-05-13
Attending: EMERGENCY MEDICINE
Payer: SUBSIDIZED

## 2019-05-13 ENCOUNTER — HOSPITAL ENCOUNTER (EMERGENCY)
Age: 54
Discharge: HOME | End: 2019-05-13
Attending: EMERGENCY MEDICINE
Payer: SUBSIDIZED

## 2019-05-13 VITALS
HEIGHT: 63 IN | OXYGEN SATURATION: 98 % | WEIGHT: 121 LBS | HEART RATE: 86 BPM | TEMPERATURE: 98.1 F | DIASTOLIC BLOOD PRESSURE: 56 MMHG | BODY MASS INDEX: 21.44 KG/M2 | SYSTOLIC BLOOD PRESSURE: 102 MMHG | RESPIRATION RATE: 18 BRPM

## 2019-05-13 DIAGNOSIS — K63.89 EPIPLOIC APPENDAGITIS: Primary | ICD-10-CM

## 2019-05-13 LAB
ALBUMIN SERPL-MCNC: 3.5 G/DL (ref 3.5–5)
ALBUMIN/GLOB SERPL: 0.9 {RATIO} (ref 1.2–3.5)
ALP SERPL-CCNC: 119 U/L (ref 50–136)
ALT SERPL-CCNC: 21 U/L (ref 12–65)
ANION GAP SERPL CALC-SCNC: 10 MMOL/L (ref 7–16)
AST SERPL-CCNC: 16 U/L (ref 15–37)
ATRIAL RATE: 88 BPM
BACTERIA URNS QL MICRO: 0 /HPF
BASOPHILS # BLD: 0.1 K/UL (ref 0–0.2)
BASOPHILS NFR BLD: 1 % (ref 0–2)
BILIRUB SERPL-MCNC: 0.1 MG/DL (ref 0.2–1.1)
BNP SERPL-MCNC: 30 PG/ML
BUN SERPL-MCNC: 20 MG/DL (ref 6–23)
CALCIUM SERPL-MCNC: 8.8 MG/DL (ref 8.3–10.4)
CALCULATED P AXIS, ECG09: 55 DEGREES
CALCULATED R AXIS, ECG10: 25 DEGREES
CALCULATED T AXIS, ECG11: 67 DEGREES
CASTS URNS QL MICRO: 0 /LPF
CHLORIDE SERPL-SCNC: 108 MMOL/L (ref 98–107)
CO2 SERPL-SCNC: 22 MMOL/L (ref 21–32)
CREAT SERPL-MCNC: 0.62 MG/DL (ref 0.6–1)
DIAGNOSIS, 93000: NORMAL
DIFFERENTIAL METHOD BLD: ABNORMAL
EOSINOPHIL # BLD: 0.1 K/UL (ref 0–0.8)
EOSINOPHIL NFR BLD: 1 % (ref 0.5–7.8)
EPI CELLS #/AREA URNS HPF: NORMAL /HPF
ERYTHROCYTE [DISTWIDTH] IN BLOOD BY AUTOMATED COUNT: 15.4 % (ref 11.9–14.6)
GLOBULIN SER CALC-MCNC: 4.1 G/DL (ref 2.3–3.5)
GLUCOSE SERPL-MCNC: 102 MG/DL (ref 65–100)
HCT VFR BLD AUTO: 38.2 % (ref 35.8–46.3)
HGB BLD-MCNC: 12.1 G/DL (ref 11.7–15.4)
IMM GRANULOCYTES # BLD AUTO: 0.1 K/UL (ref 0–0.5)
IMM GRANULOCYTES NFR BLD AUTO: 1 % (ref 0–5)
LIPASE SERPL-CCNC: 140 U/L (ref 73–393)
LYMPHOCYTES # BLD: 3 K/UL (ref 0.5–4.6)
LYMPHOCYTES NFR BLD: 26 % (ref 13–44)
MCH RBC QN AUTO: 29.7 PG (ref 26.1–32.9)
MCHC RBC AUTO-ENTMCNC: 31.7 G/DL (ref 31.4–35)
MCV RBC AUTO: 93.9 FL (ref 79.6–97.8)
MONOCYTES # BLD: 0.4 K/UL (ref 0.1–1.3)
MONOCYTES NFR BLD: 4 % (ref 4–12)
NEUTS SEG # BLD: 8.1 K/UL (ref 1.7–8.2)
NEUTS SEG NFR BLD: 69 % (ref 43–78)
NRBC # BLD: 0 K/UL (ref 0–0.2)
P-R INTERVAL, ECG05: 158 MS
PLATELET # BLD AUTO: 226 K/UL (ref 150–450)
PMV BLD AUTO: 9.7 FL (ref 9.4–12.3)
POTASSIUM SERPL-SCNC: 4.2 MMOL/L (ref 3.5–5.1)
PROT SERPL-MCNC: 7.6 G/DL (ref 6.3–8.2)
Q-T INTERVAL, ECG07: 352 MS
QRS DURATION, ECG06: 84 MS
QTC CALCULATION (BEZET), ECG08: 425 MS
RBC # BLD AUTO: 4.07 M/UL (ref 4.05–5.2)
RBC #/AREA URNS HPF: NORMAL /HPF
SODIUM SERPL-SCNC: 140 MMOL/L (ref 136–145)
TROPONIN I SERPL-MCNC: <0.02 NG/ML (ref 0.02–0.05)
VENTRICULAR RATE, ECG03: 88 BPM
WBC # BLD AUTO: 11.7 K/UL (ref 4.3–11.1)
WBC URNS QL MICRO: NORMAL /HPF

## 2019-05-13 PROCEDURE — 81015 MICROSCOPIC EXAM OF URINE: CPT

## 2019-05-13 PROCEDURE — 96374 THER/PROPH/DIAG INJ IV PUSH: CPT | Performed by: EMERGENCY MEDICINE

## 2019-05-13 PROCEDURE — 83880 ASSAY OF NATRIURETIC PEPTIDE: CPT

## 2019-05-13 PROCEDURE — 96376 TX/PRO/DX INJ SAME DRUG ADON: CPT | Performed by: EMERGENCY MEDICINE

## 2019-05-13 PROCEDURE — 81003 URINALYSIS AUTO W/O SCOPE: CPT | Performed by: EMERGENCY MEDICINE

## 2019-05-13 PROCEDURE — 74177 CT ABD & PELVIS W/CONTRAST: CPT

## 2019-05-13 PROCEDURE — 74011636320 HC RX REV CODE- 636/320: Performed by: EMERGENCY MEDICINE

## 2019-05-13 PROCEDURE — 93005 ELECTROCARDIOGRAM TRACING: CPT | Performed by: EMERGENCY MEDICINE

## 2019-05-13 PROCEDURE — 96375 TX/PRO/DX INJ NEW DRUG ADDON: CPT | Performed by: EMERGENCY MEDICINE

## 2019-05-13 PROCEDURE — 71046 X-RAY EXAM CHEST 2 VIEWS: CPT

## 2019-05-13 PROCEDURE — 83690 ASSAY OF LIPASE: CPT

## 2019-05-13 PROCEDURE — 99285 EMERGENCY DEPT VISIT HI MDM: CPT | Performed by: EMERGENCY MEDICINE

## 2019-05-13 PROCEDURE — 85025 COMPLETE CBC W/AUTO DIFF WBC: CPT

## 2019-05-13 PROCEDURE — 74011250636 HC RX REV CODE- 250/636: Performed by: EMERGENCY MEDICINE

## 2019-05-13 PROCEDURE — 74011000258 HC RX REV CODE- 258: Performed by: EMERGENCY MEDICINE

## 2019-05-13 PROCEDURE — 84484 ASSAY OF TROPONIN QUANT: CPT

## 2019-05-13 PROCEDURE — 80053 COMPREHEN METABOLIC PANEL: CPT

## 2019-05-13 RX ORDER — HYDROCODONE BITARTRATE AND ACETAMINOPHEN 5; 325 MG/1; MG/1
1 TABLET ORAL
Qty: 10 TAB | Refills: 0 | Status: SHIPPED | OUTPATIENT
Start: 2019-05-13 | End: 2019-05-16

## 2019-05-13 RX ORDER — MORPHINE SULFATE 2 MG/ML
2 INJECTION, SOLUTION INTRAMUSCULAR; INTRAVENOUS
Status: COMPLETED | OUTPATIENT
Start: 2019-05-13 | End: 2019-05-13

## 2019-05-13 RX ORDER — SODIUM CHLORIDE 0.9 % (FLUSH) 0.9 %
10 SYRINGE (ML) INJECTION
Status: COMPLETED | OUTPATIENT
Start: 2019-05-13 | End: 2019-05-13

## 2019-05-13 RX ORDER — DOCUSATE SODIUM 100 MG/1
100 CAPSULE, LIQUID FILLED ORAL 2 TIMES DAILY
Qty: 60 CAP | Refills: 2 | Status: SHIPPED | OUTPATIENT
Start: 2019-05-13 | End: 2019-08-11

## 2019-05-13 RX ORDER — MORPHINE SULFATE 2 MG/ML
4 INJECTION, SOLUTION INTRAMUSCULAR; INTRAVENOUS ONCE
Status: COMPLETED | OUTPATIENT
Start: 2019-05-13 | End: 2019-05-13

## 2019-05-13 RX ORDER — ONDANSETRON 2 MG/ML
8 INJECTION INTRAMUSCULAR; INTRAVENOUS ONCE
Status: COMPLETED | OUTPATIENT
Start: 2019-05-13 | End: 2019-05-13

## 2019-05-13 RX ORDER — MORPHINE SULFATE 2 MG/ML
4 INJECTION, SOLUTION INTRAMUSCULAR; INTRAVENOUS ONCE
Status: DISCONTINUED | OUTPATIENT
Start: 2019-05-13 | End: 2019-05-13

## 2019-05-13 RX ADMIN — ONDANSETRON 8 MG: 2 INJECTION INTRAMUSCULAR; INTRAVENOUS at 21:10

## 2019-05-13 RX ADMIN — Medication 10 ML: at 22:12

## 2019-05-13 RX ADMIN — DIATRIZOATE MEGLUMINE AND DIATRIZOATE SODIUM 15 ML: 660; 100 LIQUID ORAL; RECTAL at 20:46

## 2019-05-13 RX ADMIN — MORPHINE SULFATE 2 MG: 2 INJECTION, SOLUTION INTRAMUSCULAR; INTRAVENOUS at 23:09

## 2019-05-13 RX ADMIN — MORPHINE SULFATE 4 MG: 2 INJECTION, SOLUTION INTRAMUSCULAR; INTRAVENOUS at 20:45

## 2019-05-13 RX ADMIN — IOPAMIDOL 100 ML: 755 INJECTION, SOLUTION INTRAVENOUS at 22:12

## 2019-05-13 RX ADMIN — SODIUM CHLORIDE 100 ML: 900 INJECTION, SOLUTION INTRAVENOUS at 22:12

## 2019-05-13 NOTE — ED TRIAGE NOTES
Patient reports left upper abdominal pain, left arm, neck and chest pain since yesterday. Some shortness of breath as well. Denies cardiac history, COPD or CHF

## 2019-05-13 NOTE — ED NOTES
Pt c/o left upper chest pain that's making her sob that started this am. Discussed poc. Family at bedside.

## 2019-05-14 NOTE — ED NOTES
I have reviewed discharge instructions with the patient. The patient verbalized understanding. Patient left ED via Discharge Method: ambulatory to Home with friend. Opportunity for questions and clarification provided. Patient given 2 scripts. To continue your aftercare when you leave the hospital, you may receive an automated call from our care team to check in on how you are doing. This is a free service and part of our promise to provide the best care and service to meet your aftercare needs.  If you have questions, or wish to unsubscribe from this service please call [phone]. Thank you for Choosing our [facility] Department.

## 2019-05-14 NOTE — DISCHARGE INSTRUCTIONS
You have what is called epipolic appendigitis  This is self limiting inflammation in the abdomen. An infection and does not require surgery. Use the pain medications sparingly but it should get better in the next few days.

## 2019-05-14 NOTE — ED NOTES
While pt talking on her cell phone, pts friend requested more pain meds. Made md aware. No new orders

## 2019-05-14 NOTE — ED PROVIDER NOTES
50-year-old female presenting for left upper and left lower quadrant abdominal pain. Symptoms started yesterday and progressively worsened. She describes it as a stabbing pain that comes in waves gradually squeezes titer and then releases. Whenever comes and is at its worst the patient becomes sweaty and lightheaded. She has difficulty taking a deep breath. She feels somewhat better as the pain begins to resolve. She's never really had this before. She denies any other symptoms such as vomiting, dysuria, or bowel changes. She has been admitted several times over the past few months for bleeding ulcers but states this is much different. During some of the more pronounced episodes the pain radiates up into her chest and into the left side of her neck. The history is provided by the patient. Abdominal Pain This is a new problem. The current episode started yesterday. The problem occurs constantly. The problem has been gradually worsening. The pain is associated with an unknown factor. The pain is located in the LLQ and LUQ. The quality of the pain is colicky. The pain is at a severity of 5/10. The pain is moderate. Associated symptoms include nausea and chest pain. Pertinent negatives include no anorexia, no fever, no belching, no diarrhea, no flatus, no hematochezia, no melena, no vomiting, no constipation, no dysuria, no frequency, no hematuria, no headaches, no arthralgias, no myalgias, no trauma, no testicular pain and no back pain. The pain is worsened by coughing and deep breathing. The pain is relieved by nothing. Past workup includes CT scan, esophagogastroduodenoscopy. Past workup includes no ultrasound, no surgery, no UGI, no colonoscopy and no barium enema. Her past medical history is significant for PUD. The patient's surgical history includes cholecystectomy. Past Medical History:  
Diagnosis Date  Arthritis  Gastrointestinal disorder GERD, hiatal hernia  GERD (gastroesophageal reflux disease)  Kidney stone  Migraines  Nausea and vomiting 12/18/2018  Pyelonephritis, acute 6/30/2011  Pyelonephritis, acute Past Surgical History:  
Procedure Laterality Date  HX GYN    
 hysterectomy  HX OTHER SURGICAL    
 kidney stone surgery Family History:  
Problem Relation Age of Onset  Diabetes Mother  No Known Problems Father Social History Socioeconomic History  Marital status:  Spouse name: Not on file  Number of children: Not on file  Years of education: Not on file  Highest education level: Not on file Occupational History  Not on file Social Needs  Financial resource strain: Not on file  Food insecurity:  
  Worry: Not on file Inability: Not on file  Transportation needs:  
  Medical: Not on file Non-medical: Not on file Tobacco Use  Smoking status: Current Every Day Smoker Packs/day: 1.00 Years: 30.00 Pack years: 30.00  Smokeless tobacco: Never Used Substance and Sexual Activity  Alcohol use: No  
 Drug use: No  
 Sexual activity: Not Currently Lifestyle  Physical activity:  
  Days per week: Not on file Minutes per session: Not on file  Stress: Not on file Relationships  Social connections:  
  Talks on phone: Not on file Gets together: Not on file Attends Congregation service: Not on file Active member of club or organization: Not on file Attends meetings of clubs or organizations: Not on file Relationship status: Not on file  Intimate partner violence:  
  Fear of current or ex partner: Not on file Emotionally abused: Not on file Physically abused: Not on file Forced sexual activity: Not on file Other Topics Concern  Not on file Social History Narrative  Not on file ALLERGIES: Darvocet-n 100 [propoxyphene n-acetaminophen] and Toradol [ketorolac tromethamine] Review of Systems Constitutional: Negative for fever. Cardiovascular: Positive for chest pain. Gastrointestinal: Positive for abdominal pain and nausea. Negative for anorexia, constipation, diarrhea, flatus, hematochezia, melena and vomiting. Genitourinary: Negative for dysuria, frequency, hematuria and testicular pain. Musculoskeletal: Negative for arthralgias, back pain and myalgias. Neurological: Negative for headaches. All other systems reviewed and are negative. Vitals:  
 05/13/19 1725 BP: 142/79 Pulse: 92 Resp: 19 Temp: 97.7 °F (36.5 °C) SpO2: 100% Weight: 54.9 kg (121 lb) Height: 5' 3\" (1.6 m) Physical Exam  
Constitutional: She is oriented to person, place, and time. She appears well-developed and well-nourished. HENT:  
Head: Normocephalic and atraumatic. Eyes: Pupils are equal, round, and reactive to light. Conjunctivae are normal.  
Neck: Neck supple. Cardiovascular: Normal rate and regular rhythm. Pulmonary/Chest: Effort normal and breath sounds normal.  
Abdominal: Soft. Bowel sounds are normal. She exhibits no ascites and no mass. There is no hepatomegaly. There is tenderness. Musculoskeletal: Normal range of motion. Neurological: She is alert and oriented to person, place, and time. Skin: Skin is warm and dry. Nursing note and vitals reviewed. MDM Number of Diagnoses or Management Options Epiploic appendagitis:  
Diagnosis management comments: 53-year-old female presenting for colicky left upper and left lower quadrant abdominal pain it's been going on for 24 hours. She's had some radiation into her chest.  Concern for atypical ACS, kidney stone, diverticulitis, constipation, colitis, bowel obstruction, peptic ulcer disease, bowel perforation, volvulus Amount and/or Complexity of Data Reviewed Clinical lab tests: ordered and reviewed Tests in the radiology section of CPT®: ordered and reviewed Tests in the medicine section of CPT®: ordered and reviewed Independent visualization of images, tracings, or specimens: yes (Review the CT) Risk of Complications, Morbidity, and/or Mortality Presenting problems: moderate Diagnostic procedures: moderate Management options: moderate General comments: I personally reviewed the patient's vital signs, laboratory tests, and/or radiological findings. I discussed these findings with the patient and their significance. I answered all questions and gave the patient clear return precautions. The patient was discharged from the emergency department in stable condition Patient Progress Patient progress: improved ED Course as of May 13 2254 Mon May 13, 2019  
2115 He has a mild leukocytosis of 11. 7.herwise her labs are normal.vital signs are equally unremarkable. [JS] 65 CT read as likely epiploic appendigitis. Given the patient Toradol for pain and will discharge with a course of nonsteroidal pain medications per [JS] ED Course User Index [JS] Anna Guevara MD  
 
 
Procedures

## 2022-10-18 NOTE — ED NOTES
I have reviewed discharge instructions with the patient. The patient verbalized understanding. Patient left ED via Discharge Method: wheelchair to Home with self. Pt is sitting in lobby waiting on Tk Milian, her fellow employee, to come and take her home. Opportunity for questions and clarification provided. Patient given 2 scripts. To continue your aftercare when you leave the hospital, you may receive an automated call from our care team to check in on how you are doing. This is a free service and part of our promise to provide the best care and service to meet your aftercare needs.  If you have questions, or wish to unsubscribe from this service please call [phone]. Thank you for Choosing our [facility] Emergency Department. Left arm;

## 2023-09-21 NOTE — PROGRESS NOTES
Care Management Interventions  PCP Verified by CM: Yes  Physical Therapy Consult: No  Current Support Network: Relative's Home  Confirm Follow Up Transport: Self  Plan discussed with Pt/Family/Caregiver: Yes  Freedom of Choice Offered: Yes  Discharge Location  Discharge Placement: Home  Patient is alert and oriented. Lives with her father. Employed full time. Patient is self pay. Patient has a pcp which she pays for out of pocket. Patient has [facility] sponsorship. Patient will be given a med voucher for prescriptions at discharge. CM following. Eucrisa Counseling: Patient may experience a mild burning sensation during topical application. Eucrisa is not approved in children less than 2 years of age.

## (undated) DEVICE — CANNULA NSL ORAL AD FOR CAPNOFLEX CO2 O2 AIRLFE

## (undated) DEVICE — 1200 GUARD II KIT W/5MM TUBE W/O VAC TUBE: Brand: GUARDIAN

## (undated) DEVICE — FORCEPS BX PED L160CM JAW DIA1.8MM WRK CHN 2MM W/ NDL DISP

## (undated) DEVICE — INTENDED FOR TISSUE SEPARATION, AND OTHER PROCEDURES THAT REQUIRE A SHARP SURGICAL BLADE TO PUNCTURE OR CUT.: Brand: BARD-PARKER SAFETY BLADES SIZE 15, STERILE

## (undated) DEVICE — SOL ANTI-FOG 6ML MEDC -- MEDICHOICE - CONVERT TO 358427

## (undated) DEVICE — LAP CHOLE: Brand: MEDLINE INDUSTRIES, INC.

## (undated) DEVICE — MEDI-VAC YANKAUER SUCTION HANDLE W/BULBOUS TIP: Brand: CARDINAL HEALTH

## (undated) DEVICE — (D)PREP SKN CHLRAPRP APPL 26ML -- CONVERT TO ITEM 371833

## (undated) DEVICE — KENDALL RADIOLUCENT FOAM MONITORING ELECTRODE RECTANGULAR SHAPE: Brand: KENDALL

## (undated) DEVICE — UNIVERSAL FIXATION CANNULA: Brand: VERSAONE

## (undated) DEVICE — INTENDED FOR TISSUE SEPARATION, AND OTHER PROCEDURES THAT REQUIRE A SHARP SURGICAL BLADE TO PUNCTURE OR CUT.: Brand: BARD-PARKER SAFETY BLADES SIZE 11, STERILE

## (undated) DEVICE — STANDARD HYPODERMIC NEEDLE,POLYPROPYLENE HUB: Brand: MONOJECT

## (undated) DEVICE — CONTAINER PREFIL FRMLN 40ML --

## (undated) DEVICE — FORCEPS BX L240CM JAW DIA2.8MM L CAP W/ NDL MIC MESH TOOTH

## (undated) DEVICE — BLOCK BITE AD 60FR W/ VELC STRP ADDRESSES MOST PT AND

## (undated) DEVICE — REM POLYHESIVE ADULT PATIENT RETURN ELECTRODE: Brand: VALLEYLAB

## (undated) DEVICE — [HIGH FLOW INSUFFLATOR,  DO NOT USE IF PACKAGE IS DAMAGED,  KEEP DRY,  KEEP AWAY FROM SUNLIGHT,  PROTECT FROM HEAT AND RADIOACTIVE SOURCES.]: Brand: PNEUMOSURE

## (undated) DEVICE — DERMABOND SKIN ADH 0.7ML -- DERMABOND ADVANCED 12/BX

## (undated) DEVICE — 2, DISPOSABLE SUCTION/IRRIGATOR WITHOUT DISPOSABLE TIP: Brand: STRYKEFLOW

## (undated) DEVICE — SUTURE SZ 0 27IN 5/8 CIR UR-6  TAPER PT VIOLET ABSRB VICRYL J603H

## (undated) DEVICE — CLIP APPLIER WITH CLIP LOGIC TECHNOLOGY: Brand: ENDO CLIP III

## (undated) DEVICE — BLUNT TROCAR WITH THREADED ANCHOR: Brand: VERSAONE

## (undated) DEVICE — CONNECTOR TBNG OD5-7MM O2 END DISP

## (undated) DEVICE — CONTAINER SPEC FRMLN 120ML --

## (undated) DEVICE — LOGICUT SCISSOR LENGTH 320MM: Brand: LOGI - LAPAROSCOPIC INSTRUMENT SYSTEM

## (undated) DEVICE — BLADELESS OPTICAL TROCAR WITH FIXATION CANNULA: Brand: VERSAPORT

## (undated) DEVICE — KENDALL SCD EXPRESS SLEEVES, KNEE LENGTH, MEDIUM: Brand: KENDALL SCD

## (undated) DEVICE — SUTURE MCRYL SZ 4-0 L27IN ABSRB UD L19MM PS-2 1/2 CIR PRIM Y426H

## (undated) DEVICE — SUT VCRL + 0 36IN UR6 VIO --